# Patient Record
Sex: FEMALE | Race: BLACK OR AFRICAN AMERICAN | NOT HISPANIC OR LATINO | URBAN - METROPOLITAN AREA
[De-identification: names, ages, dates, MRNs, and addresses within clinical notes are randomized per-mention and may not be internally consistent; named-entity substitution may affect disease eponyms.]

---

## 2017-05-30 ENCOUNTER — OUTPATIENT (OUTPATIENT)
Dept: OUTPATIENT SERVICES | Facility: HOSPITAL | Age: 57
LOS: 1 days | End: 2017-05-30
Payer: COMMERCIAL

## 2017-05-30 PROCEDURE — 19081 BX BREAST 1ST LESION STRTCTC: CPT

## 2017-05-30 PROCEDURE — 88305 TISSUE EXAM BY PATHOLOGIST: CPT

## 2017-05-30 PROCEDURE — 19081 BX BREAST 1ST LESION STRTCTC: CPT | Mod: RT

## 2017-05-30 PROCEDURE — 88360 TUMOR IMMUNOHISTOCHEM/MANUAL: CPT

## 2017-05-30 PROCEDURE — A4648: CPT

## 2017-05-31 LAB — SURGICAL PATHOLOGY STUDY: SIGNIFICANT CHANGE UP

## 2017-06-28 PROBLEM — Z00.00 ENCOUNTER FOR PREVENTIVE HEALTH EXAMINATION: Status: ACTIVE | Noted: 2017-06-28

## 2017-07-14 ENCOUNTER — APPOINTMENT (OUTPATIENT)
Dept: BREAST CENTER | Facility: CLINIC | Age: 57
End: 2017-07-14

## 2017-07-14 VITALS
HEART RATE: 87 BPM | SYSTOLIC BLOOD PRESSURE: 155 MMHG | BODY MASS INDEX: 29.71 KG/M2 | TEMPERATURE: 97.8 F | WEIGHT: 174 LBS | DIASTOLIC BLOOD PRESSURE: 92 MMHG | RESPIRATION RATE: 16 BRPM | HEIGHT: 64 IN

## 2017-07-14 DIAGNOSIS — Z80.3 FAMILY HISTORY OF MALIGNANT NEOPLASM OF BREAST: ICD-10-CM

## 2017-07-14 DIAGNOSIS — Z86.39 PERSONAL HISTORY OF OTHER ENDOCRINE, NUTRITIONAL AND METABOLIC DISEASE: ICD-10-CM

## 2017-07-14 DIAGNOSIS — Z86.79 PERSONAL HISTORY OF OTHER DISEASES OF THE CIRCULATORY SYSTEM: ICD-10-CM

## 2017-07-14 DIAGNOSIS — Z87.891 PERSONAL HISTORY OF NICOTINE DEPENDENCE: ICD-10-CM

## 2017-07-14 DIAGNOSIS — Z21 ASYMPTOMATIC HUMAN IMMUNODEFICIENCY VIRUS [HIV] INFECTION STATUS: ICD-10-CM

## 2017-08-08 ENCOUNTER — APPOINTMENT (OUTPATIENT)
Dept: BREAST CENTER | Facility: CLINIC | Age: 57
End: 2017-08-08
Payer: MEDICAID

## 2017-08-08 VITALS
SYSTOLIC BLOOD PRESSURE: 176 MMHG | DIASTOLIC BLOOD PRESSURE: 80 MMHG | WEIGHT: 174 LBS | BODY MASS INDEX: 29.71 KG/M2 | HEART RATE: 82 BPM | TEMPERATURE: 98.8 F | HEIGHT: 64 IN

## 2017-08-08 PROCEDURE — 99205 OFFICE O/P NEW HI 60 MIN: CPT

## 2017-08-15 ENCOUNTER — OTHER (OUTPATIENT)
Age: 57
End: 2017-08-15

## 2017-08-17 ENCOUNTER — APPOINTMENT (OUTPATIENT)
Dept: PLASTIC SURGERY | Facility: CLINIC | Age: 57
End: 2017-08-17
Payer: MEDICAID

## 2017-08-17 PROCEDURE — 99205 OFFICE O/P NEW HI 60 MIN: CPT

## 2017-08-22 ENCOUNTER — FORM ENCOUNTER (OUTPATIENT)
Age: 57
End: 2017-08-22

## 2017-08-23 ENCOUNTER — OUTPATIENT (OUTPATIENT)
Dept: OUTPATIENT SERVICES | Facility: HOSPITAL | Age: 57
LOS: 1 days | End: 2017-08-23
Payer: COMMERCIAL

## 2017-08-23 PROCEDURE — A9585: CPT

## 2017-08-23 PROCEDURE — 77049 MRI BREAST C-+ W/CAD BI: CPT

## 2017-08-23 PROCEDURE — 77059 MRI BREAST BILATERAL: CPT | Mod: 26

## 2017-08-23 PROCEDURE — 0159T: CPT | Mod: 26

## 2017-08-23 PROCEDURE — C8937: CPT

## 2017-08-29 ENCOUNTER — APPOINTMENT (OUTPATIENT)
Dept: BREAST CENTER | Facility: CLINIC | Age: 57
End: 2017-08-29
Payer: MEDICAID

## 2017-08-29 DIAGNOSIS — N63 UNSPECIFIED LUMP IN BREAST: ICD-10-CM

## 2017-08-29 PROCEDURE — 99213 OFFICE O/P EST LOW 20 MIN: CPT

## 2017-08-30 ENCOUNTER — FORM ENCOUNTER (OUTPATIENT)
Age: 57
End: 2017-08-30

## 2017-08-31 ENCOUNTER — OUTPATIENT (OUTPATIENT)
Dept: OUTPATIENT SERVICES | Facility: HOSPITAL | Age: 57
LOS: 1 days | End: 2017-08-31
Payer: COMMERCIAL

## 2017-08-31 PROCEDURE — 76642 ULTRASOUND BREAST LIMITED: CPT

## 2017-08-31 PROCEDURE — 76642 ULTRASOUND BREAST LIMITED: CPT | Mod: 26,RT

## 2017-09-11 ENCOUNTER — OTHER (OUTPATIENT)
Age: 57
End: 2017-09-11

## 2017-09-12 ENCOUNTER — OTHER (OUTPATIENT)
Age: 57
End: 2017-09-12

## 2017-09-14 ENCOUNTER — OTHER (OUTPATIENT)
Age: 57
End: 2017-09-14

## 2017-09-15 ENCOUNTER — OTHER (OUTPATIENT)
Age: 57
End: 2017-09-15

## 2017-09-17 ENCOUNTER — FORM ENCOUNTER (OUTPATIENT)
Age: 57
End: 2017-09-17

## 2017-09-18 ENCOUNTER — OUTPATIENT (OUTPATIENT)
Dept: OUTPATIENT SERVICES | Facility: HOSPITAL | Age: 57
LOS: 1 days | End: 2017-09-18
Payer: COMMERCIAL

## 2017-09-18 ENCOUNTER — RESULT REVIEW (OUTPATIENT)
Age: 57
End: 2017-09-18

## 2017-09-18 PROCEDURE — 88342 IMHCHEM/IMCYTCHM 1ST ANTB: CPT

## 2017-09-18 PROCEDURE — 77065 DX MAMMO INCL CAD UNI: CPT

## 2017-09-18 PROCEDURE — 19083 BX BREAST 1ST LESION US IMAG: CPT | Mod: RT

## 2017-09-18 PROCEDURE — 19083 BX BREAST 1ST LESION US IMAG: CPT

## 2017-09-18 PROCEDURE — A4648: CPT

## 2017-09-18 PROCEDURE — 88305 TISSUE EXAM BY PATHOLOGIST: CPT

## 2017-09-18 PROCEDURE — 88360 TUMOR IMMUNOHISTOCHEM/MANUAL: CPT

## 2017-09-18 PROCEDURE — G0206: CPT | Mod: 26,RT

## 2017-09-20 LAB — SURGICAL PATHOLOGY STUDY: SIGNIFICANT CHANGE UP

## 2017-09-21 ENCOUNTER — OTHER (OUTPATIENT)
Age: 57
End: 2017-09-21

## 2017-09-27 RX ORDER — DEXAMETHASONE 0.5 MG/5ML
20 ELIXIR ORAL ONCE
Qty: 0 | Refills: 0 | Status: DISCONTINUED | OUTPATIENT
Start: 2017-09-28 | End: 2017-10-13

## 2017-09-27 RX ORDER — ONDANSETRON 8 MG/1
16 TABLET, FILM COATED ORAL ONCE
Qty: 0 | Refills: 0 | Status: DISCONTINUED | OUTPATIENT
Start: 2017-09-28 | End: 2017-10-13

## 2017-09-27 RX ORDER — CYCLOPHOSPHAMIDE 100 MG
1130 VIAL (EA) INTRAVENOUS ONCE
Qty: 0 | Refills: 0 | Status: DISCONTINUED | OUTPATIENT
Start: 2017-09-28 | End: 2017-10-13

## 2017-09-28 ENCOUNTER — OUTPATIENT (OUTPATIENT)
Dept: OUTPATIENT SERVICES | Facility: HOSPITAL | Age: 57
LOS: 1 days | End: 2017-09-28
Payer: COMMERCIAL

## 2017-09-28 DIAGNOSIS — C50.919 MALIGNANT NEOPLASM OF UNSPECIFIED SITE OF UNSPECIFIED FEMALE BREAST: ICD-10-CM

## 2017-09-28 DIAGNOSIS — Z51.11 ENCOUNTER FOR ANTINEOPLASTIC CHEMOTHERAPY: ICD-10-CM

## 2017-09-28 DIAGNOSIS — R11.2 NAUSEA WITH VOMITING, UNSPECIFIED: ICD-10-CM

## 2017-09-28 PROCEDURE — 96413 CHEMO IV INFUSION 1 HR: CPT

## 2017-09-28 PROCEDURE — 96372 THER/PROPH/DIAG INJ SC/IM: CPT

## 2017-09-28 PROCEDURE — 96417 CHEMO IV INFUS EACH ADDL SEQ: CPT

## 2017-09-28 PROCEDURE — 96375 TX/PRO/DX INJ NEW DRUG ADDON: CPT

## 2017-09-28 RX ORDER — DOXORUBICIN HYDROCHLORIDE 2 MG/ML
113 INJECTION, SOLUTION INTRAVENOUS ONCE
Qty: 0 | Refills: 0 | Status: DISCONTINUED | OUTPATIENT
Start: 2017-09-28 | End: 2017-10-13

## 2017-09-28 RX ORDER — PEGFILGRASTIM-CBQV 6 MG/.6ML
6 INJECTION, SOLUTION SUBCUTANEOUS ONCE
Qty: 0 | Refills: 0 | Status: DISCONTINUED | OUTPATIENT
Start: 2017-09-28 | End: 2017-09-29

## 2017-09-29 ENCOUNTER — OUTPATIENT (OUTPATIENT)
Dept: OUTPATIENT SERVICES | Facility: HOSPITAL | Age: 57
LOS: 1 days | End: 2017-09-29
Payer: COMMERCIAL

## 2017-09-29 DIAGNOSIS — Z51.11 ENCOUNTER FOR ANTINEOPLASTIC CHEMOTHERAPY: ICD-10-CM

## 2017-09-29 DIAGNOSIS — C50.919 MALIGNANT NEOPLASM OF UNSPECIFIED SITE OF UNSPECIFIED FEMALE BREAST: ICD-10-CM

## 2017-09-29 PROCEDURE — 96372 THER/PROPH/DIAG INJ SC/IM: CPT

## 2017-09-29 RX ORDER — PEGFILGRASTIM-CBQV 6 MG/.6ML
6 INJECTION, SOLUTION SUBCUTANEOUS ONCE
Qty: 0 | Refills: 0 | Status: DISCONTINUED | OUTPATIENT
Start: 2017-09-29 | End: 2017-10-14

## 2017-10-10 ENCOUNTER — OTHER (OUTPATIENT)
Age: 57
End: 2017-10-10

## 2017-10-16 RX ORDER — CYCLOPHOSPHAMIDE 100 MG
1140 VIAL (EA) INTRAVENOUS ONCE
Qty: 0 | Refills: 0 | Status: DISCONTINUED | OUTPATIENT
Start: 2017-10-17 | End: 2017-11-01

## 2017-10-16 RX ORDER — PEGFILGRASTIM-CBQV 6 MG/.6ML
6 INJECTION, SOLUTION SUBCUTANEOUS ONCE
Qty: 0 | Refills: 0 | Status: DISCONTINUED | OUTPATIENT
Start: 2017-10-17 | End: 2017-11-01

## 2017-10-16 RX ORDER — ONDANSETRON 8 MG/1
16 TABLET, FILM COATED ORAL ONCE
Qty: 0 | Refills: 0 | Status: DISCONTINUED | OUTPATIENT
Start: 2017-10-17 | End: 2017-11-01

## 2017-10-16 RX ORDER — DEXAMETHASONE 0.5 MG/5ML
20 ELIXIR ORAL ONCE
Qty: 0 | Refills: 0 | Status: DISCONTINUED | OUTPATIENT
Start: 2017-10-17 | End: 2017-11-01

## 2017-10-17 ENCOUNTER — APPOINTMENT (OUTPATIENT)
Dept: INFUSION THERAPY | Facility: HOSPITAL | Age: 57
End: 2017-10-17

## 2017-10-17 ENCOUNTER — OUTPATIENT (OUTPATIENT)
Dept: OUTPATIENT SERVICES | Facility: HOSPITAL | Age: 57
LOS: 1 days | End: 2017-10-17
Payer: COMMERCIAL

## 2017-10-17 DIAGNOSIS — C50.919 MALIGNANT NEOPLASM OF UNSPECIFIED SITE OF UNSPECIFIED FEMALE BREAST: ICD-10-CM

## 2017-10-17 PROCEDURE — 96413 CHEMO IV INFUSION 1 HR: CPT

## 2017-10-17 PROCEDURE — 96372 THER/PROPH/DIAG INJ SC/IM: CPT

## 2017-10-17 PROCEDURE — 96411 CHEMO IV PUSH ADDL DRUG: CPT

## 2017-10-17 PROCEDURE — 96375 TX/PRO/DX INJ NEW DRUG ADDON: CPT

## 2017-10-17 RX ORDER — DOXORUBICIN HYDROCHLORIDE 2 MG/ML
114 INJECTION, SOLUTION INTRAVENOUS ONCE
Qty: 0 | Refills: 0 | Status: DISCONTINUED | OUTPATIENT
Start: 2017-10-17 | End: 2017-10-17

## 2017-10-17 RX ORDER — DOXORUBICIN HYDROCHLORIDE 2 MG/ML
114 INJECTION, SOLUTION INTRAVENOUS ONCE
Qty: 0 | Refills: 0 | Status: DISCONTINUED | OUTPATIENT
Start: 2017-10-17 | End: 2017-11-01

## 2017-11-01 DIAGNOSIS — R11.2 NAUSEA WITH VOMITING, UNSPECIFIED: ICD-10-CM

## 2017-11-01 DIAGNOSIS — Z51.11 ENCOUNTER FOR ANTINEOPLASTIC CHEMOTHERAPY: ICD-10-CM

## 2017-11-02 RX ORDER — DEXAMETHASONE 0.5 MG/5ML
20 ELIXIR ORAL ONCE
Qty: 0 | Refills: 0 | Status: DISCONTINUED | OUTPATIENT
Start: 2017-11-03 | End: 2017-11-18

## 2017-11-02 RX ORDER — DOXORUBICIN HYDROCHLORIDE 2 MG/ML
113 INJECTION, SOLUTION INTRAVENOUS ONCE
Qty: 0 | Refills: 0 | Status: DISCONTINUED | OUTPATIENT
Start: 2017-11-03 | End: 2017-11-18

## 2017-11-02 RX ORDER — ONDANSETRON 8 MG/1
16 TABLET, FILM COATED ORAL ONCE
Qty: 0 | Refills: 0 | Status: DISCONTINUED | OUTPATIENT
Start: 2017-11-03 | End: 2017-11-18

## 2017-11-02 RX ORDER — CYCLOPHOSPHAMIDE 100 MG
1130 VIAL (EA) INTRAVENOUS ONCE
Qty: 0 | Refills: 0 | Status: DISCONTINUED | OUTPATIENT
Start: 2017-11-03 | End: 2017-11-18

## 2017-11-03 ENCOUNTER — OUTPATIENT (OUTPATIENT)
Dept: OUTPATIENT SERVICES | Facility: HOSPITAL | Age: 57
LOS: 1 days | End: 2017-11-03
Payer: COMMERCIAL

## 2017-11-03 ENCOUNTER — APPOINTMENT (OUTPATIENT)
Dept: INFUSION THERAPY | Facility: HOSPITAL | Age: 57
End: 2017-11-03

## 2017-11-03 DIAGNOSIS — C50.919 MALIGNANT NEOPLASM OF UNSPECIFIED SITE OF UNSPECIFIED FEMALE BREAST: ICD-10-CM

## 2017-11-03 PROCEDURE — 96413 CHEMO IV INFUSION 1 HR: CPT

## 2017-11-03 PROCEDURE — 96375 TX/PRO/DX INJ NEW DRUG ADDON: CPT

## 2017-11-03 PROCEDURE — 96417 CHEMO IV INFUS EACH ADDL SEQ: CPT

## 2017-11-03 RX ORDER — PEGFILGRASTIM-CBQV 6 MG/.6ML
6 INJECTION, SOLUTION SUBCUTANEOUS ONCE
Qty: 0 | Refills: 0 | Status: DISCONTINUED | OUTPATIENT
Start: 2017-11-03 | End: 2017-11-03

## 2017-11-04 ENCOUNTER — OUTPATIENT (OUTPATIENT)
Dept: OUTPATIENT SERVICES | Facility: HOSPITAL | Age: 57
LOS: 1 days | End: 2017-11-04
Payer: COMMERCIAL

## 2017-11-04 DIAGNOSIS — C50.919 MALIGNANT NEOPLASM OF UNSPECIFIED SITE OF UNSPECIFIED FEMALE BREAST: ICD-10-CM

## 2017-11-04 PROCEDURE — 96372 THER/PROPH/DIAG INJ SC/IM: CPT

## 2017-11-06 DIAGNOSIS — Z51.11 ENCOUNTER FOR ANTINEOPLASTIC CHEMOTHERAPY: ICD-10-CM

## 2017-11-16 RX ORDER — CYCLOPHOSPHAMIDE 100 MG
1130 VIAL (EA) INTRAVENOUS ONCE
Qty: 0 | Refills: 0 | Status: DISCONTINUED | OUTPATIENT
Start: 2017-11-21 | End: 2017-12-06

## 2017-11-16 RX ORDER — DOXORUBICIN HYDROCHLORIDE 2 MG/ML
113 INJECTION, SOLUTION INTRAVENOUS ONCE
Qty: 0 | Refills: 0 | Status: DISCONTINUED | OUTPATIENT
Start: 2017-11-21 | End: 2017-12-06

## 2017-11-16 RX ORDER — DIPHENHYDRAMINE HCL 50 MG
25 CAPSULE ORAL ONCE
Qty: 0 | Refills: 0 | Status: DISCONTINUED | OUTPATIENT
Start: 2017-11-21 | End: 2017-12-06

## 2017-11-16 RX ORDER — ONDANSETRON 8 MG/1
16 TABLET, FILM COATED ORAL ONCE
Qty: 0 | Refills: 0 | Status: DISCONTINUED | OUTPATIENT
Start: 2017-11-21 | End: 2017-12-06

## 2017-11-16 RX ORDER — DEXAMETHASONE 0.5 MG/5ML
20 ELIXIR ORAL ONCE
Qty: 0 | Refills: 0 | Status: DISCONTINUED | OUTPATIENT
Start: 2017-11-21 | End: 2017-12-06

## 2017-11-17 ENCOUNTER — APPOINTMENT (OUTPATIENT)
Dept: INFUSION THERAPY | Facility: HOSPITAL | Age: 57
End: 2017-11-17

## 2017-11-21 ENCOUNTER — OUTPATIENT (OUTPATIENT)
Dept: OUTPATIENT SERVICES | Facility: HOSPITAL | Age: 57
LOS: 1 days | End: 2017-11-21
Payer: COMMERCIAL

## 2017-11-21 DIAGNOSIS — C50.919 MALIGNANT NEOPLASM OF UNSPECIFIED SITE OF UNSPECIFIED FEMALE BREAST: ICD-10-CM

## 2017-11-21 PROCEDURE — 96417 CHEMO IV INFUS EACH ADDL SEQ: CPT

## 2017-11-21 PROCEDURE — 96375 TX/PRO/DX INJ NEW DRUG ADDON: CPT

## 2017-11-21 PROCEDURE — 96413 CHEMO IV INFUSION 1 HR: CPT

## 2017-11-22 ENCOUNTER — OUTPATIENT (OUTPATIENT)
Dept: OUTPATIENT SERVICES | Facility: HOSPITAL | Age: 57
LOS: 1 days | End: 2017-11-22
Payer: COMMERCIAL

## 2017-11-22 ENCOUNTER — EMERGENCY (EMERGENCY)
Facility: HOSPITAL | Age: 57
LOS: 1 days | Discharge: HOME | End: 2017-11-22
Admitting: EMERGENCY MEDICINE
Payer: COMMERCIAL

## 2017-11-22 VITALS
OXYGEN SATURATION: 98 % | RESPIRATION RATE: 20 BRPM | HEIGHT: 64 IN | SYSTOLIC BLOOD PRESSURE: 155 MMHG | HEART RATE: 91 BPM | DIASTOLIC BLOOD PRESSURE: 80 MMHG | WEIGHT: 175.05 LBS | TEMPERATURE: 99 F

## 2017-11-22 DIAGNOSIS — E11.9 TYPE 2 DIABETES MELLITUS WITHOUT COMPLICATIONS: ICD-10-CM

## 2017-11-22 DIAGNOSIS — T16.1XXA FOREIGN BODY IN RIGHT EAR, INITIAL ENCOUNTER: ICD-10-CM
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
## 2017-11-22 DIAGNOSIS — I10 ESSENTIAL (PRIMARY) HYPERTENSION: ICD-10-CM
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
## 2017-11-22 DIAGNOSIS — C50.919 MALIGNANT NEOPLASM OF UNSPECIFIED SITE OF UNSPECIFIED FEMALE BREAST: ICD-10-CM

## 2017-11-22 PROCEDURE — 99282 EMERGENCY DEPT VISIT SF MDM: CPT | Mod: 25

## 2017-11-22 PROCEDURE — 69200 CLEAR OUTER EAR CANAL: CPT | Mod: RT

## 2017-11-22 PROCEDURE — 99283 EMERGENCY DEPT VISIT LOW MDM: CPT | Mod: 25

## 2017-11-22 PROCEDURE — 96372 THER/PROPH/DIAG INJ SC/IM: CPT

## 2017-11-22 RX ORDER — PEGFILGRASTIM-CBQV 6 MG/.6ML
6 INJECTION, SOLUTION SUBCUTANEOUS ONCE
Qty: 0 | Refills: 0 | Status: DISCONTINUED | OUTPATIENT
Start: 2017-11-22 | End: 2017-12-07

## 2017-11-22 NOTE — ED PROVIDER NOTE - CARE PLAN
Principal Discharge DX:	Foreign body in ear  Instructions for follow-up, activity and diet:	Please follow up with your PMD

## 2017-11-22 NOTE — ED PROVIDER NOTE - PHYSICAL EXAMINATION
Right ear: small cotton located in right ear. Removed. Ear shows no blood, signs of trauma with nml TM

## 2017-11-22 NOTE — ED PROVIDER NOTE - OBJECTIVE STATEMENT
58 y/o female with no pertinent pmhx is present with a FB in the right ear x2 hours. Pt reports using a q-tip this morning and the cotton remained in her ear. Pt denies the following: pain, discharge, ringing.

## 2017-11-22 NOTE — ED PROVIDER NOTE - PMH
Diabetes    HIV (human immunodeficiency virus infection)    HTN (hypertension)    HTN (hypertension)

## 2017-11-22 NOTE — ED ADULT NURSE NOTE - OBJECTIVE STATEMENT
Patient comes in procedure room states, "this morning I got a q-tip stuck in my right ear." No prior tx.

## 2017-11-22 NOTE — ED ADULT NURSE NOTE - CHPI ED SYMPTOMS NEG
no chills/no blurred vision/no vomiting/no nausea/no numbness/no weakness/no syncope/no loss of consciousness/no fever/no change in level of consciousness/denies any blood or drainage coming for the right ear, ringing in the ears.

## 2017-11-22 NOTE — ED PROVIDER NOTE - MEDICAL DECISION MAKING DETAILS
58 y/o female with cotton tip stuck in the right ear. Removed with forceps. TM clear with no perforation and no trauma to the ear canal with Hearing intact. Advised pt to refrain from using q-tips for ear cleaning and to use tissue paper instead.

## 2017-11-28 DIAGNOSIS — R11.2 NAUSEA WITH VOMITING, UNSPECIFIED: ICD-10-CM

## 2017-11-28 DIAGNOSIS — R13.11 DYSPHAGIA, ORAL PHASE: ICD-10-CM

## 2017-11-28 DIAGNOSIS — Z51.11 ENCOUNTER FOR ANTINEOPLASTIC CHEMOTHERAPY: ICD-10-CM

## 2017-12-06 ENCOUNTER — APPOINTMENT (OUTPATIENT)
Dept: BREAST CENTER | Facility: CLINIC | Age: 57
End: 2017-12-06

## 2017-12-19 ENCOUNTER — APPOINTMENT (OUTPATIENT)
Dept: BREAST CENTER | Facility: CLINIC | Age: 57
End: 2017-12-19
Payer: MEDICAID

## 2017-12-19 ENCOUNTER — EMERGENCY (EMERGENCY)
Facility: HOSPITAL | Age: 57
LOS: 1 days | Discharge: HOME | End: 2017-12-19
Attending: EMERGENCY MEDICINE | Admitting: EMERGENCY MEDICINE
Payer: COMMERCIAL

## 2017-12-19 VITALS
BODY MASS INDEX: 29.02 KG/M2 | HEART RATE: 66 BPM | HEIGHT: 64 IN | TEMPERATURE: 97.6 F | RESPIRATION RATE: 16 BRPM | SYSTOLIC BLOOD PRESSURE: 144 MMHG | DIASTOLIC BLOOD PRESSURE: 88 MMHG | WEIGHT: 170 LBS

## 2017-12-19 VITALS
OXYGEN SATURATION: 96 % | HEIGHT: 64 IN | TEMPERATURE: 98 F | SYSTOLIC BLOOD PRESSURE: 143 MMHG | WEIGHT: 160.06 LBS | DIASTOLIC BLOOD PRESSURE: 90 MMHG | RESPIRATION RATE: 16 BRPM | HEART RATE: 90 BPM

## 2017-12-19 DIAGNOSIS — R89.7 ABNORMAL HISTOLOGICAL FINDINGS IN SPECIMENS FROM OTHER ORGANS, SYSTEMS AND TISSUES: ICD-10-CM

## 2017-12-19 PROCEDURE — 99283 EMERGENCY DEPT VISIT LOW MDM: CPT | Mod: 25

## 2017-12-19 PROCEDURE — 99214 OFFICE O/P EST MOD 30 MIN: CPT

## 2017-12-19 PROCEDURE — 93005 ELECTROCARDIOGRAM TRACING: CPT

## 2017-12-19 NOTE — ED ADULT TRIAGE NOTE - ARRIVAL INFO ADDITIONAL COMMENTS
Associated symptoms productive cough with white sputum and exertional SOB. Denies any CP, dizziness, fever, N/V/D.

## 2017-12-19 NOTE — ED ADULT TRIAGE NOTE - CHIEF COMPLAINT QUOTE
"I am having trouble breathing for about 3 weeks. I just finished chemo in the end of November for breast cancer. When I lay on my side I hear like wheezing."

## 2017-12-22 DIAGNOSIS — R06.02 SHORTNESS OF BREATH: ICD-10-CM

## 2017-12-28 ENCOUNTER — APPOINTMENT (OUTPATIENT)
Dept: PLASTIC SURGERY | Facility: CLINIC | Age: 57
End: 2017-12-28

## 2018-01-08 ENCOUNTER — APPOINTMENT (OUTPATIENT)
Dept: PLASTIC SURGERY | Facility: CLINIC | Age: 58
End: 2018-01-08
Payer: MEDICAID

## 2018-01-08 VITALS — WEIGHT: 165 LBS | BODY MASS INDEX: 28.17 KG/M2 | HEIGHT: 64 IN

## 2018-01-08 PROCEDURE — 99213 OFFICE O/P EST LOW 20 MIN: CPT

## 2018-01-09 ENCOUNTER — FORM ENCOUNTER (OUTPATIENT)
Age: 58
End: 2018-01-09

## 2018-01-10 ENCOUNTER — OUTPATIENT (OUTPATIENT)
Dept: OUTPATIENT SERVICES | Facility: HOSPITAL | Age: 58
LOS: 1 days | End: 2018-01-10
Payer: COMMERCIAL

## 2018-01-10 PROCEDURE — 77065 DX MAMMO INCL CAD UNI: CPT

## 2018-01-10 PROCEDURE — 76641 ULTRASOUND BREAST COMPLETE: CPT | Mod: 26,LT

## 2018-01-10 PROCEDURE — 76641 ULTRASOUND BREAST COMPLETE: CPT

## 2018-01-10 PROCEDURE — 77065 DX MAMMO INCL CAD UNI: CPT | Mod: 26,LT

## 2018-01-17 ENCOUNTER — FORM ENCOUNTER (OUTPATIENT)
Age: 58
End: 2018-01-17

## 2018-01-18 ENCOUNTER — RESULT REVIEW (OUTPATIENT)
Age: 58
End: 2018-01-18

## 2018-01-18 ENCOUNTER — OUTPATIENT (OUTPATIENT)
Dept: OUTPATIENT SERVICES | Facility: HOSPITAL | Age: 58
LOS: 1 days | End: 2018-01-18
Payer: COMMERCIAL

## 2018-01-18 PROCEDURE — 19083 BX BREAST 1ST LESION US IMAG: CPT

## 2018-01-18 PROCEDURE — 88305 TISSUE EXAM BY PATHOLOGIST: CPT

## 2018-01-18 PROCEDURE — A4648: CPT

## 2018-01-18 PROCEDURE — 19083 BX BREAST 1ST LESION US IMAG: CPT | Mod: LT

## 2018-01-19 LAB — SURGICAL PATHOLOGY STUDY: SIGNIFICANT CHANGE UP

## 2018-01-24 VITALS
SYSTOLIC BLOOD PRESSURE: 173 MMHG | DIASTOLIC BLOOD PRESSURE: 76 MMHG | TEMPERATURE: 98 F | HEIGHT: 64 IN | OXYGEN SATURATION: 100 % | WEIGHT: 156.97 LBS | HEART RATE: 86 BPM | RESPIRATION RATE: 18 BRPM

## 2018-01-24 RX ORDER — METFORMIN HYDROCHLORIDE 850 MG/1
0 TABLET ORAL
Qty: 0 | Refills: 0 | COMMUNITY

## 2018-01-24 RX ORDER — VALACYCLOVIR 500 MG/1
1 TABLET, FILM COATED ORAL
Qty: 0 | Refills: 0 | COMMUNITY

## 2018-01-24 RX ORDER — ACYCLOVIR SODIUM 500 MG
0 VIAL (EA) INTRAVENOUS
Qty: 0 | Refills: 0 | COMMUNITY

## 2018-01-24 RX ORDER — LISINOPRIL 2.5 MG/1
0 TABLET ORAL
Qty: 0 | Refills: 0 | COMMUNITY

## 2018-01-24 NOTE — PATIENT PROFILE ADULT. - PMH
Breast cancer  right  Cardiomyopathy    Diabetes  type 2  HIV (human immunodeficiency virus infection)    HLD (hyperlipidemia)    HTN (hypertension)    Mitral regurgitation Breast cancer  right, s/p chemo  Cardiomyopathy    Diabetes  type 2  HIV (human immunodeficiency virus infection)    HLD (hyperlipidemia)    HTN (hypertension)    Mitral regurgitation

## 2018-01-25 ENCOUNTER — INPATIENT (INPATIENT)
Facility: HOSPITAL | Age: 58
LOS: 1 days | Discharge: HOME | DRG: 580 | End: 2018-01-27
Attending: SURGERY | Admitting: SURGERY
Payer: COMMERCIAL

## 2018-01-25 ENCOUNTER — APPOINTMENT (OUTPATIENT)
Dept: PLASTIC SURGERY | Facility: HOSPITAL | Age: 58
End: 2018-01-25

## 2018-01-25 ENCOUNTER — APPOINTMENT (OUTPATIENT)
Dept: BREAST CENTER | Facility: HOSPITAL | Age: 58
End: 2018-01-25

## 2018-01-25 ENCOUNTER — RESULT REVIEW (OUTPATIENT)
Age: 58
End: 2018-01-25

## 2018-01-25 DIAGNOSIS — Z98.890 OTHER SPECIFIED POSTPROCEDURAL STATES: Chronic | ICD-10-CM

## 2018-01-25 LAB
GLUCOSE BLDC GLUCOMTR-MCNC: 128 MG/DL — HIGH (ref 70–99)
GLUCOSE BLDC GLUCOMTR-MCNC: 87 MG/DL — SIGNIFICANT CHANGE UP (ref 70–99)

## 2018-01-25 PROCEDURE — 19307 MAST MOD RAD: CPT | Mod: RT,GC

## 2018-01-25 PROCEDURE — 78195 LYMPH SYSTEM IMAGING: CPT | Mod: 26

## 2018-01-25 PROCEDURE — 19357 TISS XPNDR PLMT BRST RCNSTJ: CPT | Mod: RT

## 2018-01-25 RX ORDER — SODIUM CHLORIDE 9 MG/ML
1000 INJECTION, SOLUTION INTRAVENOUS
Qty: 0 | Refills: 0 | Status: DISCONTINUED | OUTPATIENT
Start: 2018-01-25 | End: 2018-01-27

## 2018-01-25 RX ORDER — LABETALOL HCL 100 MG
10 TABLET ORAL ONCE
Qty: 0 | Refills: 0 | Status: COMPLETED | OUTPATIENT
Start: 2018-01-25 | End: 2018-01-25

## 2018-01-25 RX ORDER — INSULIN LISPRO 100/ML
VIAL (ML) SUBCUTANEOUS
Qty: 0 | Refills: 0 | Status: DISCONTINUED | OUTPATIENT
Start: 2018-01-25 | End: 2018-01-27

## 2018-01-25 RX ORDER — GLUCAGON INJECTION, SOLUTION 0.5 MG/.1ML
1 INJECTION, SOLUTION SUBCUTANEOUS ONCE
Qty: 0 | Refills: 0 | Status: DISCONTINUED | OUTPATIENT
Start: 2018-01-25 | End: 2018-01-27

## 2018-01-25 RX ORDER — DEXTROSE 50 % IN WATER 50 %
12.5 SYRINGE (ML) INTRAVENOUS ONCE
Qty: 0 | Refills: 0 | Status: DISCONTINUED | OUTPATIENT
Start: 2018-01-25 | End: 2018-01-27

## 2018-01-25 RX ORDER — ACETAMINOPHEN 500 MG
650 TABLET ORAL EVERY 6 HOURS
Qty: 0 | Refills: 0 | Status: DISCONTINUED | OUTPATIENT
Start: 2018-01-25 | End: 2018-01-27

## 2018-01-25 RX ORDER — LISINOPRIL 2.5 MG/1
20 TABLET ORAL DAILY
Qty: 0 | Refills: 0 | Status: DISCONTINUED | OUTPATIENT
Start: 2018-01-26 | End: 2018-01-27

## 2018-01-25 RX ORDER — HYDROMORPHONE HYDROCHLORIDE 2 MG/ML
0.5 INJECTION INTRAMUSCULAR; INTRAVENOUS; SUBCUTANEOUS EVERY 4 HOURS
Qty: 0 | Refills: 0 | Status: DISCONTINUED | OUTPATIENT
Start: 2018-01-25 | End: 2018-01-27

## 2018-01-25 RX ORDER — ONDANSETRON 8 MG/1
4 TABLET, FILM COATED ORAL EVERY 6 HOURS
Qty: 0 | Refills: 0 | Status: DISCONTINUED | OUTPATIENT
Start: 2018-01-25 | End: 2018-01-27

## 2018-01-25 RX ORDER — HYDROMORPHONE HYDROCHLORIDE 2 MG/ML
1 INJECTION INTRAMUSCULAR; INTRAVENOUS; SUBCUTANEOUS EVERY 4 HOURS
Qty: 0 | Refills: 0 | Status: DISCONTINUED | OUTPATIENT
Start: 2018-01-25 | End: 2018-01-27

## 2018-01-25 RX ORDER — CEFAZOLIN SODIUM 1 G
2000 VIAL (EA) INJECTION EVERY 8 HOURS
Qty: 0 | Refills: 0 | Status: DISCONTINUED | OUTPATIENT
Start: 2018-01-25 | End: 2018-01-27

## 2018-01-25 RX ORDER — RISPERIDONE 4 MG/1
2 TABLET ORAL
Qty: 0 | Refills: 0 | Status: DISCONTINUED | OUTPATIENT
Start: 2018-01-26 | End: 2018-01-27

## 2018-01-25 RX ORDER — DEXTROSE 50 % IN WATER 50 %
25 SYRINGE (ML) INTRAVENOUS ONCE
Qty: 0 | Refills: 0 | Status: DISCONTINUED | OUTPATIENT
Start: 2018-01-25 | End: 2018-01-27

## 2018-01-25 RX ORDER — DEXTROSE 50 % IN WATER 50 %
1 SYRINGE (ML) INTRAVENOUS ONCE
Qty: 0 | Refills: 0 | Status: DISCONTINUED | OUTPATIENT
Start: 2018-01-25 | End: 2018-01-27

## 2018-01-25 RX ORDER — HEPARIN SODIUM 5000 [USP'U]/ML
5000 INJECTION INTRAVENOUS; SUBCUTANEOUS EVERY 8 HOURS
Qty: 0 | Refills: 0 | Status: DISCONTINUED | OUTPATIENT
Start: 2018-01-25 | End: 2018-01-27

## 2018-01-25 RX ADMIN — Medication 10 MILLIGRAM(S): at 21:07

## 2018-01-25 RX ADMIN — Medication 10 MILLIGRAM(S): at 20:52

## 2018-01-25 RX ADMIN — Medication 2000 MILLIGRAM(S): at 22:57

## 2018-01-25 NOTE — PROGRESS NOTE ADULT - ASSESSMENT
A/P: 57y Female s/p above procedure  Diet: CLD  IVF  cefazolin  Pain/nausea control  JPx2  SQH/SCDs/OOBA/IS  Dispo pending pain control, PO tolerance, clinical improvement

## 2018-01-25 NOTE — H&P ADULT - ASSESSMENT
58 yo female weitony R breast ca, s/p chemotherapy, presents for elective mastectomy with expander placement    - regional level of care  - advance diet as tolerated  - LR@75  - IV Dilaudid, Tylenol prn  - anti-nausea meds  - DVT ppx  - pulm toilet/IS 56 yo female weitony R breast ca, s/p chemotherapy, presents for elective mastectomy with expander placement    - regional level of care  - advance diet as tolerated  - gentle IVF  - IV Dilaudid, Tylenol prn  - anti-nausea meds  - DVT ppx  - pulm toilet/IS 56 yo female alli R breast ca, s/p chemotherapy, presents for elective mastectomy with expander placement    - regional level of care  - clears, diet in am  - IV Ancef  - IV Dilaudid, Tylenol prn  - anti-nausea meds  - DVT ppx  - pulm toilet/IS

## 2018-01-25 NOTE — PROGRESS NOTE ADULT - SUBJECTIVE AND OBJECTIVE BOX
POST-OPERATIVE NOTE    Procedure: right modified mastrectomy with sentinal lymph node biopsy and reconstruction with tissue expander    Diagnosis/Indication: right breast cancer    Surgeon: Dr. Vargas    S: Pt has no complaints. Denies CP, SOB, DUNN, calf tenderness. Pain controlled with medication. Denies nausea, vomiting.    O:  T(C): 36.8 (01-25-18 @ 21:50), Max: 36.8 (01-25-18 @ 21:50)  T(F): 98.3 (01-25-18 @ 21:50), Max: 98.3 (01-25-18 @ 21:50)  HR: 84 (01-25-18 @ 22:09) (80 - 86)  BP: 162/90 (01-25-18 @ 22:09) (152/96 - 167/100)  RR: 7 (01-25-18 @ 22:09) (7 - 17)  SpO2: 100% (01-25-18 @ 22:09) (99% - 100%)  Wt(kg): --            Gen: NAD, resting comfortably in bed  C/V: NSR  Chest: surgical incisions clean, dry, and intact. JPx2 with serousangious  Pulm: Nonlabored breathing, no respiratory distress  Abd: soft, NT/ND  Extrem: WWP, no calf edema or tenderness, SCDs in place      A/P: 57y Female s/p above procedure  Diet:  IVF:  Pain/nausea control  SQH/SCDs/OOBA/IS  Dispo pending pain control, PO tolerance, clinical improvement

## 2018-01-25 NOTE — BRIEF OPERATIVE NOTE - PROCEDURE
<<-----Click on this checkbox to enter Procedure Mastectomy with axillary lymph node dissection  01/25/2018    Active  RON

## 2018-01-25 NOTE — H&P ADULT - PMH
Breast cancer  right, s/p chemo  Cardiomyopathy    Diabetes  type 2  HIV (human immunodeficiency virus infection)    HLD (hyperlipidemia)    HTN (hypertension)    Mitral regurgitation

## 2018-01-25 NOTE — H&P ADULT - HISTORY OF PRESENT ILLNESS
56 yo female with hx of HIV, cardiomyopathy (EF 40%, after stress test 28%), severe mitral valve regurgitation, HTN, HLD, DM2, s/p 3 x  and hernia repair, with known right breast cancer, s/p chemotherapy *(finished 2017) presents for elective right mastectomy with expander placement. 58 yo female with hx of HIV, cardiomyopathy (EF 40%, after stress test 28%), severe mitral valve regurgitation, HTN, HLD, DM2, s/p 3 x  and hernia repair, with known right breast cancer, s/p chemotherapy *(finished 2017) presents for elective right mastectomy/ sentinel lymph node biopsy vs lymphadenectomy with expander placement.

## 2018-01-25 NOTE — BRIEF OPERATIVE NOTE - OPERATION/FINDINGS
Right modified radical mastectomy, 3 sentinel lymph node biopsy and completion right lymphadenectomy.  Plastic surgery team to complete reconstruction with tissue expander

## 2018-01-25 NOTE — H&P ADULT - NSHPPHYSICALEXAM_GEN_ALL_CORE
Alert and oriented x 3, in no distress  CTAB  RRR  Abdomen soft, ND, NT throughout  Palpable R breast mass  Extremities: WWP

## 2018-01-26 ENCOUNTER — TRANSCRIPTION ENCOUNTER (OUTPATIENT)
Age: 58
End: 2018-01-26

## 2018-01-26 LAB
ANION GAP SERPL CALC-SCNC: 13 MMOL/L — SIGNIFICANT CHANGE UP (ref 5–17)
BUN SERPL-MCNC: 11 MG/DL — SIGNIFICANT CHANGE UP (ref 7–23)
CALCIUM SERPL-MCNC: 9.1 MG/DL — SIGNIFICANT CHANGE UP (ref 8.4–10.5)
CHLORIDE SERPL-SCNC: 103 MMOL/L — SIGNIFICANT CHANGE UP (ref 96–108)
CO2 SERPL-SCNC: 23 MMOL/L — SIGNIFICANT CHANGE UP (ref 22–31)
CREAT SERPL-MCNC: 0.68 MG/DL — SIGNIFICANT CHANGE UP (ref 0.5–1.3)
GLUCOSE BLDC GLUCOMTR-MCNC: 152 MG/DL — HIGH (ref 70–99)
GLUCOSE BLDC GLUCOMTR-MCNC: 158 MG/DL — HIGH (ref 70–99)
GLUCOSE BLDC GLUCOMTR-MCNC: 164 MG/DL — HIGH (ref 70–99)
GLUCOSE BLDC GLUCOMTR-MCNC: 184 MG/DL — HIGH (ref 70–99)
GLUCOSE SERPL-MCNC: 181 MG/DL — HIGH (ref 70–99)
HCT VFR BLD CALC: 38.1 % — SIGNIFICANT CHANGE UP (ref 34.5–45)
HGB BLD-MCNC: 12.6 G/DL — SIGNIFICANT CHANGE UP (ref 11.5–15.5)
MAGNESIUM SERPL-MCNC: 1.7 MG/DL — SIGNIFICANT CHANGE UP (ref 1.6–2.6)
MCHC RBC-ENTMCNC: 33.1 G/DL — SIGNIFICANT CHANGE UP (ref 32–36)
MCHC RBC-ENTMCNC: 34.1 PG — HIGH (ref 27–34)
MCV RBC AUTO: 103.3 FL — HIGH (ref 80–100)
PHOSPHATE SERPL-MCNC: 3.4 MG/DL — SIGNIFICANT CHANGE UP (ref 2.5–4.5)
PLATELET # BLD AUTO: 199 K/UL — SIGNIFICANT CHANGE UP (ref 150–400)
POTASSIUM SERPL-MCNC: 3.4 MMOL/L — LOW (ref 3.5–5.3)
POTASSIUM SERPL-SCNC: 3.4 MMOL/L — LOW (ref 3.5–5.3)
RBC # BLD: 3.69 M/UL — LOW (ref 3.8–5.2)
RBC # FLD: 13.5 % — SIGNIFICANT CHANGE UP (ref 10.3–16.9)
SODIUM SERPL-SCNC: 139 MMOL/L — SIGNIFICANT CHANGE UP (ref 135–145)
WBC # BLD: 6.4 K/UL — SIGNIFICANT CHANGE UP (ref 3.8–10.5)
WBC # FLD AUTO: 6.4 K/UL — SIGNIFICANT CHANGE UP (ref 3.8–10.5)

## 2018-01-26 RX ORDER — POTASSIUM CHLORIDE 20 MEQ
20 PACKET (EA) ORAL
Qty: 0 | Refills: 0 | Status: COMPLETED | OUTPATIENT
Start: 2018-01-26 | End: 2018-01-26

## 2018-01-26 RX ORDER — ACETAMINOPHEN 500 MG
2 TABLET ORAL
Qty: 0 | Refills: 0 | COMMUNITY
Start: 2018-01-26

## 2018-01-26 RX ADMIN — Medication 2: at 07:21

## 2018-01-26 RX ADMIN — Medication 2: at 22:21

## 2018-01-26 RX ADMIN — HEPARIN SODIUM 5000 UNIT(S): 5000 INJECTION INTRAVENOUS; SUBCUTANEOUS at 09:15

## 2018-01-26 RX ADMIN — HEPARIN SODIUM 5000 UNIT(S): 5000 INJECTION INTRAVENOUS; SUBCUTANEOUS at 17:30

## 2018-01-26 RX ADMIN — LISINOPRIL 20 MILLIGRAM(S): 2.5 TABLET ORAL at 06:25

## 2018-01-26 RX ADMIN — Medication 2000 MILLIGRAM(S): at 22:05

## 2018-01-26 RX ADMIN — Medication 2000 MILLIGRAM(S): at 06:25

## 2018-01-26 RX ADMIN — Medication 2: at 17:30

## 2018-01-26 RX ADMIN — HEPARIN SODIUM 5000 UNIT(S): 5000 INJECTION INTRAVENOUS; SUBCUTANEOUS at 00:31

## 2018-01-26 RX ADMIN — Medication 1250 MILLIGRAM(S): at 09:15

## 2018-01-26 RX ADMIN — Medication 2000 MILLIGRAM(S): at 15:00

## 2018-01-26 RX ADMIN — Medication 2: at 11:40

## 2018-01-26 RX ADMIN — Medication 650 MILLIGRAM(S): at 17:38

## 2018-01-26 RX ADMIN — Medication 20 MILLIEQUIVALENT(S): at 11:40

## 2018-01-26 RX ADMIN — RISPERIDONE 2 MILLIGRAM(S): 4 TABLET ORAL at 06:24

## 2018-01-26 RX ADMIN — Medication 20 MILLIEQUIVALENT(S): at 09:15

## 2018-01-26 RX ADMIN — Medication 650 MILLIGRAM(S): at 07:21

## 2018-01-26 RX ADMIN — Medication 650 MILLIGRAM(S): at 23:41

## 2018-01-26 NOTE — DISCHARGE NOTE ADULT - CARE PROVIDER_API CALL
Gissel Jean), Surgery  [address] [place] [address]  [address] [place] [address]  Phone: [phone]  Fax: [phone]    Vaughn Costa), Plastic Surgery  [address] [place], [place] [address]  Phone: [phone]  Fax: [phone]

## 2018-01-26 NOTE — DISCHARGE NOTE ADULT - MEDICATION SUMMARY - MEDICATIONS TO TAKE
I will START or STAY ON the medications listed below when I get home from the hospital:    aspirin 81 mg oral tablet  -- 1 tab(s) by mouth once a day  -- Indication: For Home med     acetaminophen 325 mg oral tablet  -- 2 tab(s) by mouth every 6 hours, As needed, light pain  -- Indication: For Home med     lisinopril 20 mg oral tablet  -- 1 tab(s) by mouth once a day  -- Indication: For Home med     metFORMIN 1000 mg oral tablet  -- 1 tab(s) by mouth 2 times a day  -- Indication: For Home med     RisperDAL 2 mg oral tablet  -- 1 tab(s) by mouth 2 times a day  -- Indication: For Home med     lamivudine-zidovudine 150 mg-300 mg oral tablet  -- 1 tab(s) by mouth 2 times a day  -- Indication: For Home med I will START or STAY ON the medications listed below when I get home from the hospital:    aspirin 81 mg oral tablet  -- 1 tab(s) by mouth once a day  -- Indication: For Home med     acetaminophen 325 mg oral tablet  -- 2 tab(s) by mouth every 6 hours, As needed, light pain  -- Indication: For Home med     Percocet 5/325 oral tablet  -- 1 tab(s) by mouth every 4 hours MDD:8 tabs  -- Caution federal law prohibits the transfer of this drug to any person other  than the person for whom it was prescribed.  May cause drowsiness.  Alcohol may intensify this effect.  Use care when operating dangerous machinery.  This prescription cannot be refilled.  This product contains acetaminophen.  Do not use  with any other product containing acetaminophen to prevent possible liver damage.  Using more of this medication than prescribed may cause serious breathing problems.    -- Indication: For pain medications    lisinopril 20 mg oral tablet  -- 1 tab(s) by mouth once a day  -- Indication: For Home med     metFORMIN 1000 mg oral tablet  -- 1 tab(s) by mouth 2 times a day  -- Indication: For Home med     RisperDAL 2 mg oral tablet  -- 1 tab(s) by mouth 2 times a day  -- Indication: For Home med     lamivudine-zidovudine 150 mg-300 mg oral tablet  -- 1 tab(s) by mouth 2 times a day  -- Indication: For Home med

## 2018-01-26 NOTE — DISCHARGE NOTE ADULT - CARE PROVIDERS DIRECT ADDRESSES
[email or web address]

## 2018-01-26 NOTE — DISCHARGE NOTE ADULT - PLAN OF CARE
follow up follow up in the office w/ Dr. Jean. Also follow up in the office w/ Dr. Costa. Take antibiotics as prescribed by plastic surgery team. Can take pain medication PRN. follow up in the office w/ Dr. Jean. Also follow up in the office w/ Dr. Costa. Take antibiotics as prescribed by plastic surgery team. Can take pain medication as needed. follow up in the office w/ Dr. Jean. Also follow up in the office w/ Dr. Costa. Take antibiotics as prescribed by plastic surgery team. Can take pain medication as needed.    Please empty the drains daily and record the output from the drains.

## 2018-01-26 NOTE — PROGRESS NOTE ADULT - ASSESSMENT
58 yo female alli R breast ca, s/p chemotherapy, presents for elective mastectomy with expander placement    - telemetry level of care  - clears tonight, diet in am  - IV Ancef until discharge  - IV Dilaudid, Tylenol prn  - anti-nausea meds  - home HIV/HTN meds, holding ASA (restart on 1/27), holding metformin  - ISS  - DVT ppx  - pulm toilet/IS 58 yo female weitony R breast ca, s/p chemotherapy, presents for elective mastectomy with expander placement  - telemetry level of care  - reg diet   - IV Ancef until discharge  - home HIV/HTN meds, holding ASA (restart on 1/27), holding metformin  - ISS  - DVT ppx  - pulm toilet/IS

## 2018-01-26 NOTE — PROGRESS NOTE ADULT - SUBJECTIVE AND OBJECTIVE BOX
Ortho Progress Note    Subjective:  57y Female s/p R mastectomy and recon w TE, POD 1 . Patient seen and examined, doing well, pain endorsed but controlled. No acute events overnight. Denies CP/SOB/N/V      Objective:    Vital Signs Last 24 Hrs  T(C): 36.9 (26 Jan 2018 06:02), Max: 36.9 (26 Jan 2018 06:02)  T(F): 98.4 (26 Jan 2018 06:02), Max: 98.4 (26 Jan 2018 06:02)  HR: 86 (26 Jan 2018 04:51) (78 - 86)  BP: 146/86 (26 Jan 2018 04:51) (133/82 - 167/100)  BP(mean): 110 (26 Jan 2018 04:51) (100 - 127)  RR: 17 (26 Jan 2018 04:51) (7 - 19)  SpO2: 100% (26 Jan 2018 04:51) (99% - 100%)    NAD, AOx3, comfortable    Dressing: C/D/I  Sensation: SILT                             12.6   6.4   )-----------( 199      ( 26 Jan 2018 06:05 )             38.1         A/P:  57y Female s/p above  -Stable  -Pain Control  -care per primary team  -Dispo: ok to discharge from plastics prospective       Mathew Leger MD, PGY-1

## 2018-01-26 NOTE — DISCHARGE NOTE ADULT - HOSPITAL COURSE
58 yo female with R breast ca, s/p chemotherapy, presents for elective mastectomy with expander placement. Underwent modified radical mastectomy w/ drain placement x 2. Discharged home in stable condition. 58 yo female with R breast ca, s/p chemotherapy, presents for elective mastectomy with expander placement. Underwent modified radical mastectomy w/ drain placement x 2 and tissue expander. Tolerated the procedure well. Post-operative course was uncomplicated. She os discharged home in stable condition with instructions for outpatient follow up.

## 2018-01-26 NOTE — DISCHARGE NOTE ADULT - NS AS ACTIVITY OBS
Showering allowed/Walking-Indoors allowed/No Heavy lifting/straining/Walking-Outdoors allowed/Stairs allowed/Do not make important decisions/Do not drive or operate machinery/Sex allowed

## 2018-01-26 NOTE — DISCHARGE NOTE ADULT - CARE PLAN
Principal Discharge DX:	Breast cancer  Goal:	follow up  Assessment and plan of treatment:	follow up in the office w/ Dr. Jean. Also follow up in the office w/ Dr. Costa. Take antibiotics as prescribed by plastic surgery team. Can take pain medication PRN. Principal Discharge DX:	Breast cancer  Goal:	follow up  Assessment and plan of treatment:	follow up in the office w/ Dr. Jean. Also follow up in the office w/ Dr. Costa. Take antibiotics as prescribed by plastic surgery team. Can take pain medication as needed. Principal Discharge DX:	Breast cancer  Goal:	follow up  Assessment and plan of treatment:	follow up in the office w/ Dr. Jean. Also follow up in the office w/ Dr. Costa. Take antibiotics as prescribed by plastic surgery team. Can take pain medication as needed.    Please empty the drains daily and record the output from the drains.

## 2018-01-26 NOTE — PROGRESS NOTE ADULT - SUBJECTIVE AND OBJECTIVE BOX
O/N:  POC wnl, passed TOV. VSS. OCTAVIO O/N:  POC wnl, passed TOV. VSS. OCTAVIO      Vital Signs Last 24 Hrs  T(C): 36.9 (26 Jan 2018 06:02), Max: 36.9 (26 Jan 2018 06:02)  T(F): 98.4 (26 Jan 2018 06:02), Max: 98.4 (26 Jan 2018 06:02)  HR: 86 (26 Jan 2018 04:51) (78 - 86)  BP: 146/86 (26 Jan 2018 04:51) (133/82 - 167/100)  BP(mean): 110 (26 Jan 2018 04:51) (100 - 127)  RR: 17 (26 Jan 2018 04:51) (7 - 19)  SpO2: 100% (26 Jan 2018 04:51) (99% - 100%)      I&O's Summary    25 Jan 2018 07:01  -  26 Jan 2018 06:20  --------------------------------------------------------  IN: 760 mL / OUT: 450 mL / NET: 310 mL        Gen: NAD   Chest: dressing c/d/i   ISAIAH x 2 sero sanguinous

## 2018-01-27 VITALS
RESPIRATION RATE: 16 BRPM | TEMPERATURE: 97 F | SYSTOLIC BLOOD PRESSURE: 154 MMHG | DIASTOLIC BLOOD PRESSURE: 99 MMHG | OXYGEN SATURATION: 97 % | HEART RATE: 94 BPM

## 2018-01-27 LAB
ANION GAP SERPL CALC-SCNC: 14 MMOL/L — SIGNIFICANT CHANGE UP (ref 5–17)
BUN SERPL-MCNC: 12 MG/DL — SIGNIFICANT CHANGE UP (ref 7–23)
CALCIUM SERPL-MCNC: 9.4 MG/DL — SIGNIFICANT CHANGE UP (ref 8.4–10.5)
CHLORIDE SERPL-SCNC: 103 MMOL/L — SIGNIFICANT CHANGE UP (ref 96–108)
CO2 SERPL-SCNC: 22 MMOL/L — SIGNIFICANT CHANGE UP (ref 22–31)
CREAT SERPL-MCNC: 0.63 MG/DL — SIGNIFICANT CHANGE UP (ref 0.5–1.3)
GLUCOSE BLDC GLUCOMTR-MCNC: 139 MG/DL — HIGH (ref 70–99)
GLUCOSE BLDC GLUCOMTR-MCNC: 150 MG/DL — HIGH (ref 70–99)
GLUCOSE SERPL-MCNC: 162 MG/DL — HIGH (ref 70–99)
MAGNESIUM SERPL-MCNC: 1.9 MG/DL — SIGNIFICANT CHANGE UP (ref 1.6–2.6)
PHOSPHATE SERPL-MCNC: 2.7 MG/DL — SIGNIFICANT CHANGE UP (ref 2.5–4.5)
POTASSIUM SERPL-MCNC: 3.3 MMOL/L — LOW (ref 3.5–5.3)
POTASSIUM SERPL-SCNC: 3.3 MMOL/L — LOW (ref 3.5–5.3)
SODIUM SERPL-SCNC: 139 MMOL/L — SIGNIFICANT CHANGE UP (ref 135–145)

## 2018-01-27 RX ORDER — SODIUM,POTASSIUM PHOSPHATES 278-250MG
1 POWDER IN PACKET (EA) ORAL ONCE
Qty: 0 | Refills: 0 | Status: DISCONTINUED | OUTPATIENT
Start: 2018-01-27 | End: 2018-01-27

## 2018-01-27 RX ORDER — POTASSIUM CHLORIDE 20 MEQ
40 PACKET (EA) ORAL
Qty: 0 | Refills: 0 | Status: COMPLETED | OUTPATIENT
Start: 2018-01-27 | End: 2018-01-27

## 2018-01-27 RX ADMIN — HEPARIN SODIUM 5000 UNIT(S): 5000 INJECTION INTRAVENOUS; SUBCUTANEOUS at 01:00

## 2018-01-27 RX ADMIN — Medication 2000 MILLIGRAM(S): at 05:14

## 2018-01-27 RX ADMIN — LISINOPRIL 20 MILLIGRAM(S): 2.5 TABLET ORAL at 05:14

## 2018-01-27 RX ADMIN — HYDROMORPHONE HYDROCHLORIDE 1 MILLIGRAM(S): 2 INJECTION INTRAMUSCULAR; INTRAVENOUS; SUBCUTANEOUS at 07:33

## 2018-01-27 RX ADMIN — RISPERIDONE 2 MILLIGRAM(S): 4 TABLET ORAL at 09:55

## 2018-01-27 RX ADMIN — HYDROMORPHONE HYDROCHLORIDE 1 MILLIGRAM(S): 2 INJECTION INTRAMUSCULAR; INTRAVENOUS; SUBCUTANEOUS at 07:03

## 2018-01-27 RX ADMIN — Medication 40 MILLIEQUIVALENT(S): at 10:42

## 2018-01-27 RX ADMIN — Medication 1250 MILLIGRAM(S): at 09:55

## 2018-01-27 RX ADMIN — HEPARIN SODIUM 5000 UNIT(S): 5000 INJECTION INTRAVENOUS; SUBCUTANEOUS at 09:55

## 2018-01-27 NOTE — PROGRESS NOTE ADULT - SUBJECTIVE AND OBJECTIVE BOX
Pt seen and examined. Doing well. No acute events o/n. Stayed for pain control.    T(C): 37.2 (01-27-18 @ 05:41), Max: 37.5 (01-26-18 @ 13:39)  T(F): 98.9 (01-27-18 @ 05:41), Max: 99.5 (01-26-18 @ 13:39)  HR: 88 (01-27-18 @ 05:41) (65 - 88)  BP: 143/87 (01-27-18 @ 05:41) (133/65 - 146/84)  RR: 16 (01-27-18 @ 05:41) (16 - 17)  SpO2: 98% (01-27-18 @ 05:41) (95% - 99%)      26 Jan 2018 07:01  -  27 Jan 2018 07:00  --------------------------------------------------------  IN:    Oral Fluid: 120 mL  Total IN: 120 mL    OUT:    Bulb: 102.5 mL    Bulb: 72.5 mL    Voided: 1000 mL  Total OUT: 1175 mL    Total NET: -1055 mL          Exam:  Dressing c/d/i  R breast soft, no palpable collections.  JPs serosanguinous.                           12.6   6.4   )-----------( 199      ( 26 Jan 2018 06:05 )             38.1     01-27    139  |  103  |  12  ----------------------------<  162<H>  3.3<L>   |  22  |  0.63    Ca    9.4      27 Jan 2018 07:05  Phos  2.7     01-27  Mg     1.9     01-27        Plan:

## 2018-01-27 NOTE — PROGRESS NOTE ADULT - ASSESSMENT
58 yo female alli R breast ca, s/p chemotherapy, presents for elective mastectomy with expander placement    - telemetry level of care  - clears tonight, diet in am  - IV Ancef until discharge  - IV Dilaudid, Tylenol prn  - anti-nausea meds  - home HIV/HTN meds, holding ASA (restart on 1/27), holding metformin  - ISS  - DVT ppx  - pulm toilet/IS

## 2018-01-29 PROBLEM — I34.0 NONRHEUMATIC MITRAL (VALVE) INSUFFICIENCY: Chronic | Status: ACTIVE | Noted: 2018-01-24

## 2018-01-29 PROBLEM — C50.919 MALIGNANT NEOPLASM OF UNSPECIFIED SITE OF UNSPECIFIED FEMALE BREAST: Chronic | Status: ACTIVE | Noted: 2018-01-24

## 2018-01-29 PROBLEM — I42.9 CARDIOMYOPATHY, UNSPECIFIED: Chronic | Status: ACTIVE | Noted: 2018-01-24

## 2018-01-29 PROBLEM — E78.5 HYPERLIPIDEMIA, UNSPECIFIED: Chronic | Status: ACTIVE | Noted: 2018-01-24

## 2018-01-31 ENCOUNTER — APPOINTMENT (OUTPATIENT)
Dept: PLASTIC SURGERY | Facility: CLINIC | Age: 58
End: 2018-01-31
Payer: MEDICAID

## 2018-01-31 ENCOUNTER — APPOINTMENT (OUTPATIENT)
Dept: BREAST CENTER | Facility: CLINIC | Age: 58
End: 2018-01-31
Payer: MEDICAID

## 2018-01-31 VITALS
WEIGHT: 165 LBS | DIASTOLIC BLOOD PRESSURE: 95 MMHG | TEMPERATURE: 98.5 F | HEIGHT: 64 IN | SYSTOLIC BLOOD PRESSURE: 154 MMHG | BODY MASS INDEX: 28.17 KG/M2 | HEART RATE: 100 BPM

## 2018-01-31 DIAGNOSIS — R93.3 ABNORMAL FINDINGS ON DIAGNOSTIC IMAGING OF OTHER PARTS OF DIGESTIVE TRACT: ICD-10-CM

## 2018-01-31 PROCEDURE — 99024 POSTOP FOLLOW-UP VISIT: CPT

## 2018-02-01 ENCOUNTER — APPOINTMENT (OUTPATIENT)
Dept: PLASTIC SURGERY | Facility: CLINIC | Age: 58
End: 2018-02-01

## 2018-02-01 LAB — SURGICAL PATHOLOGY STUDY: SIGNIFICANT CHANGE UP

## 2018-02-02 DIAGNOSIS — Z79.82 LONG TERM (CURRENT) USE OF ASPIRIN: ICD-10-CM

## 2018-02-02 DIAGNOSIS — Z21 ASYMPTOMATIC HUMAN IMMUNODEFICIENCY VIRUS [HIV] INFECTION STATUS: ICD-10-CM

## 2018-02-02 DIAGNOSIS — Z92.21 PERSONAL HISTORY OF ANTINEOPLASTIC CHEMOTHERAPY: ICD-10-CM

## 2018-02-02 DIAGNOSIS — E11.9 TYPE 2 DIABETES MELLITUS WITHOUT COMPLICATIONS: ICD-10-CM

## 2018-02-02 DIAGNOSIS — I10 ESSENTIAL (PRIMARY) HYPERTENSION: ICD-10-CM

## 2018-02-02 DIAGNOSIS — E78.5 HYPERLIPIDEMIA, UNSPECIFIED: ICD-10-CM

## 2018-02-02 DIAGNOSIS — I34.0 NONRHEUMATIC MITRAL (VALVE) INSUFFICIENCY: ICD-10-CM

## 2018-02-02 DIAGNOSIS — I42.9 CARDIOMYOPATHY, UNSPECIFIED: ICD-10-CM

## 2018-02-02 DIAGNOSIS — C50.911 MALIGNANT NEOPLASM OF UNSPECIFIED SITE OF RIGHT FEMALE BREAST: ICD-10-CM

## 2018-02-02 DIAGNOSIS — Z79.84 LONG TERM (CURRENT) USE OF ORAL HYPOGLYCEMIC DRUGS: ICD-10-CM

## 2018-02-02 DIAGNOSIS — Z87.891 PERSONAL HISTORY OF NICOTINE DEPENDENCE: ICD-10-CM

## 2018-02-07 PROCEDURE — 80048 BASIC METABOLIC PNL TOTAL CA: CPT

## 2018-02-07 PROCEDURE — 85027 COMPLETE CBC AUTOMATED: CPT

## 2018-02-07 PROCEDURE — 82962 GLUCOSE BLOOD TEST: CPT

## 2018-02-07 PROCEDURE — 88305 TISSUE EXAM BY PATHOLOGIST: CPT

## 2018-02-07 PROCEDURE — C1789: CPT

## 2018-02-07 PROCEDURE — 36415 COLL VENOUS BLD VENIPUNCTURE: CPT

## 2018-02-07 PROCEDURE — A9541: CPT

## 2018-02-07 PROCEDURE — 88304 TISSUE EXAM BY PATHOLOGIST: CPT

## 2018-02-07 PROCEDURE — 83735 ASSAY OF MAGNESIUM: CPT

## 2018-02-07 PROCEDURE — 88307 TISSUE EXAM BY PATHOLOGIST: CPT

## 2018-02-07 PROCEDURE — 78195 LYMPH SYSTEM IMAGING: CPT

## 2018-02-07 PROCEDURE — 88331 PATH CONSLTJ SURG 1 BLK 1SPC: CPT

## 2018-02-07 PROCEDURE — 84100 ASSAY OF PHOSPHORUS: CPT

## 2018-02-07 PROCEDURE — 88341 IMHCHEM/IMCYTCHM EA ADD ANTB: CPT

## 2018-02-08 ENCOUNTER — APPOINTMENT (OUTPATIENT)
Dept: PLASTIC SURGERY | Facility: CLINIC | Age: 58
End: 2018-02-08
Payer: MEDICAID

## 2018-02-08 PROCEDURE — 99024 POSTOP FOLLOW-UP VISIT: CPT

## 2018-02-22 ENCOUNTER — APPOINTMENT (OUTPATIENT)
Dept: PLASTIC SURGERY | Facility: CLINIC | Age: 58
End: 2018-02-22
Payer: MEDICAID

## 2018-02-22 PROCEDURE — 99024 POSTOP FOLLOW-UP VISIT: CPT

## 2018-03-02 ENCOUNTER — APPOINTMENT (OUTPATIENT)
Age: 58
End: 2018-03-02

## 2018-03-08 ENCOUNTER — APPOINTMENT (OUTPATIENT)
Dept: PLASTIC SURGERY | Facility: CLINIC | Age: 58
End: 2018-03-08
Payer: MEDICAID

## 2018-03-08 ENCOUNTER — INPATIENT (INPATIENT)
Facility: HOSPITAL | Age: 58
LOS: 5 days | Discharge: HOME | DRG: 287 | End: 2018-03-14
Attending: INTERNAL MEDICINE | Admitting: INTERNAL MEDICINE
Payer: COMMERCIAL

## 2018-03-08 VITALS
SYSTOLIC BLOOD PRESSURE: 157 MMHG | TEMPERATURE: 98 F | DIASTOLIC BLOOD PRESSURE: 95 MMHG | HEART RATE: 110 BPM | WEIGHT: 154.1 LBS | RESPIRATION RATE: 20 BRPM | OXYGEN SATURATION: 96 %

## 2018-03-08 DIAGNOSIS — Z98.890 OTHER SPECIFIED POSTPROCEDURAL STATES: Chronic | ICD-10-CM

## 2018-03-08 DIAGNOSIS — F99 MENTAL DISORDER, NOT OTHERWISE SPECIFIED: ICD-10-CM

## 2018-03-08 DIAGNOSIS — I10 ESSENTIAL (PRIMARY) HYPERTENSION: ICD-10-CM

## 2018-03-08 DIAGNOSIS — I38 ENDOCARDITIS, VALVE UNSPECIFIED: ICD-10-CM

## 2018-03-08 DIAGNOSIS — C50.919 MALIGNANT NEOPLASM OF UNSPECIFIED SITE OF UNSPECIFIED FEMALE BREAST: ICD-10-CM

## 2018-03-08 DIAGNOSIS — B20 HUMAN IMMUNODEFICIENCY VIRUS [HIV] DISEASE: ICD-10-CM

## 2018-03-08 DIAGNOSIS — I50.23 ACUTE ON CHRONIC SYSTOLIC (CONGESTIVE) HEART FAILURE: ICD-10-CM

## 2018-03-08 LAB
ALBUMIN SERPL ELPH-MCNC: 3.6 G/DL — SIGNIFICANT CHANGE UP (ref 3.3–5)
ALP SERPL-CCNC: 57 U/L — SIGNIFICANT CHANGE UP (ref 40–120)
ALT FLD-CCNC: 43 U/L — SIGNIFICANT CHANGE UP (ref 10–45)
ANION GAP SERPL CALC-SCNC: 13 MMOL/L — SIGNIFICANT CHANGE UP (ref 5–17)
APTT BLD: 32.2 SEC — SIGNIFICANT CHANGE UP (ref 27.5–37.4)
AST SERPL-CCNC: 66 U/L — HIGH (ref 10–40)
BASOPHILS NFR BLD AUTO: 0.6 % — SIGNIFICANT CHANGE UP (ref 0–2)
BILIRUB SERPL-MCNC: 0.8 MG/DL — SIGNIFICANT CHANGE UP (ref 0.2–1.2)
BUN SERPL-MCNC: 15 MG/DL — SIGNIFICANT CHANGE UP (ref 7–23)
CALCIUM SERPL-MCNC: 9.1 MG/DL — SIGNIFICANT CHANGE UP (ref 8.4–10.5)
CHLORIDE SERPL-SCNC: 103 MMOL/L — SIGNIFICANT CHANGE UP (ref 96–108)
CK MB CFR SERPL CALC: 2.1 NG/ML — SIGNIFICANT CHANGE UP (ref 0–6.7)
CO2 SERPL-SCNC: 22 MMOL/L — SIGNIFICANT CHANGE UP (ref 22–31)
CREAT SERPL-MCNC: 0.81 MG/DL — SIGNIFICANT CHANGE UP (ref 0.5–1.3)
EOSINOPHIL NFR BLD AUTO: 0.6 % — SIGNIFICANT CHANGE UP (ref 0–6)
GLUCOSE BLDC GLUCOMTR-MCNC: 104 MG/DL — HIGH (ref 70–99)
GLUCOSE BLDC GLUCOMTR-MCNC: 112 MG/DL — HIGH (ref 70–99)
GLUCOSE SERPL-MCNC: 141 MG/DL — HIGH (ref 70–99)
HCT VFR BLD CALC: 41.8 % — SIGNIFICANT CHANGE UP (ref 34.5–45)
HGB BLD-MCNC: 13.8 G/DL — SIGNIFICANT CHANGE UP (ref 11.5–15.5)
INR BLD: 1.28 — HIGH (ref 0.88–1.16)
LYMPHOCYTES # BLD AUTO: 47.5 % — HIGH (ref 13–44)
MCHC RBC-ENTMCNC: 33 G/DL — SIGNIFICANT CHANGE UP (ref 32–36)
MCHC RBC-ENTMCNC: 34.4 PG — HIGH (ref 27–34)
MCV RBC AUTO: 104.2 FL — HIGH (ref 80–100)
MONOCYTES NFR BLD AUTO: 8.3 % — SIGNIFICANT CHANGE UP (ref 2–14)
NEUTROPHILS NFR BLD AUTO: 43 % — SIGNIFICANT CHANGE UP (ref 43–77)
NT-PROBNP SERPL-SCNC: 7293 PG/ML — HIGH (ref 0–300)
PLATELET # BLD AUTO: 177 K/UL — SIGNIFICANT CHANGE UP (ref 150–400)
POTASSIUM SERPL-MCNC: 3.6 MMOL/L — SIGNIFICANT CHANGE UP (ref 3.5–5.3)
POTASSIUM SERPL-SCNC: 3.6 MMOL/L — SIGNIFICANT CHANGE UP (ref 3.5–5.3)
PROT SERPL-MCNC: 7.8 G/DL — SIGNIFICANT CHANGE UP (ref 6–8.3)
PROTHROM AB SERPL-ACNC: 14.3 SEC — HIGH (ref 9.8–12.7)
RBC # BLD: 4.01 M/UL — SIGNIFICANT CHANGE UP (ref 3.8–5.2)
RBC # FLD: 14.1 % — SIGNIFICANT CHANGE UP (ref 10.3–16.9)
SODIUM SERPL-SCNC: 138 MMOL/L — SIGNIFICANT CHANGE UP (ref 135–145)
TROPONIN T SERPL-MCNC: 0.03 NG/ML — HIGH (ref 0–0.01)
TROPONIN T SERPL-MCNC: 0.03 NG/ML — HIGH (ref 0–0.01)
WBC # BLD: 3.6 K/UL — LOW (ref 3.8–10.5)
WBC # FLD AUTO: 3.6 K/UL — LOW (ref 3.8–10.5)

## 2018-03-08 PROCEDURE — 71046 X-RAY EXAM CHEST 2 VIEWS: CPT | Mod: 26

## 2018-03-08 PROCEDURE — 99285 EMERGENCY DEPT VISIT HI MDM: CPT

## 2018-03-08 PROCEDURE — 99024 POSTOP FOLLOW-UP VISIT: CPT

## 2018-03-08 PROCEDURE — 99223 1ST HOSP IP/OBS HIGH 75: CPT | Mod: AI

## 2018-03-08 PROCEDURE — 71275 CT ANGIOGRAPHY CHEST: CPT | Mod: 26

## 2018-03-08 PROCEDURE — 93306 TTE W/DOPPLER COMPLETE: CPT | Mod: 26

## 2018-03-08 RX ORDER — SODIUM CHLORIDE 9 MG/ML
1000 INJECTION, SOLUTION INTRAVENOUS
Qty: 0 | Refills: 0 | Status: DISCONTINUED | OUTPATIENT
Start: 2018-03-08 | End: 2018-03-14

## 2018-03-08 RX ORDER — DEXTROSE 50 % IN WATER 50 %
1 SYRINGE (ML) INTRAVENOUS ONCE
Qty: 0 | Refills: 0 | Status: DISCONTINUED | OUTPATIENT
Start: 2018-03-08 | End: 2018-03-14

## 2018-03-08 RX ORDER — DEXTROSE 50 % IN WATER 50 %
25 SYRINGE (ML) INTRAVENOUS ONCE
Qty: 0 | Refills: 0 | Status: DISCONTINUED | OUTPATIENT
Start: 2018-03-08 | End: 2018-03-14

## 2018-03-08 RX ORDER — ASPIRIN/CALCIUM CARB/MAGNESIUM 324 MG
325 TABLET ORAL ONCE
Qty: 0 | Refills: 0 | Status: COMPLETED | OUTPATIENT
Start: 2018-03-08 | End: 2018-03-08

## 2018-03-08 RX ORDER — LISINOPRIL 2.5 MG/1
1 TABLET ORAL
Qty: 0 | Refills: 0 | COMMUNITY

## 2018-03-08 RX ORDER — SODIUM CHLORIDE 9 MG/ML
1000 INJECTION INTRAMUSCULAR; INTRAVENOUS; SUBCUTANEOUS ONCE
Qty: 0 | Refills: 0 | Status: COMPLETED | OUTPATIENT
Start: 2018-03-08 | End: 2018-03-08

## 2018-03-08 RX ORDER — ASPIRIN/CALCIUM CARB/MAGNESIUM 324 MG
81 TABLET ORAL DAILY
Qty: 0 | Refills: 0 | Status: DISCONTINUED | OUTPATIENT
Start: 2018-03-09 | End: 2018-03-14

## 2018-03-08 RX ORDER — FUROSEMIDE 40 MG
40 TABLET ORAL EVERY 12 HOURS
Qty: 0 | Refills: 0 | Status: DISCONTINUED | OUTPATIENT
Start: 2018-03-08 | End: 2018-03-12

## 2018-03-08 RX ORDER — FUROSEMIDE 40 MG
20 TABLET ORAL EVERY 12 HOURS
Qty: 0 | Refills: 0 | Status: DISCONTINUED | OUTPATIENT
Start: 2018-03-08 | End: 2018-03-08

## 2018-03-08 RX ORDER — ACETAMINOPHEN 500 MG
650 TABLET ORAL ONCE
Qty: 0 | Refills: 0 | Status: COMPLETED | OUTPATIENT
Start: 2018-03-08 | End: 2018-03-08

## 2018-03-08 RX ORDER — INSULIN LISPRO 100/ML
VIAL (ML) SUBCUTANEOUS
Qty: 0 | Refills: 0 | Status: DISCONTINUED | OUTPATIENT
Start: 2018-03-08 | End: 2018-03-14

## 2018-03-08 RX ORDER — SACUBITRIL AND VALSARTAN 24; 26 MG/1; MG/1
1 TABLET, FILM COATED ORAL
Qty: 0 | Refills: 0 | Status: DISCONTINUED | OUTPATIENT
Start: 2018-03-08 | End: 2018-03-08

## 2018-03-08 RX ORDER — HEPARIN SODIUM 5000 [USP'U]/ML
5000 INJECTION INTRAVENOUS; SUBCUTANEOUS EVERY 8 HOURS
Qty: 0 | Refills: 0 | Status: DISCONTINUED | OUTPATIENT
Start: 2018-03-08 | End: 2018-03-12

## 2018-03-08 RX ORDER — GLUCAGON INJECTION, SOLUTION 0.5 MG/.1ML
1 INJECTION, SOLUTION SUBCUTANEOUS ONCE
Qty: 0 | Refills: 0 | Status: DISCONTINUED | OUTPATIENT
Start: 2018-03-08 | End: 2018-03-14

## 2018-03-08 RX ORDER — FUROSEMIDE 40 MG
40 TABLET ORAL ONCE
Qty: 0 | Refills: 0 | Status: COMPLETED | OUTPATIENT
Start: 2018-03-08 | End: 2018-03-08

## 2018-03-08 RX ORDER — DEXTROSE 50 % IN WATER 50 %
12.5 SYRINGE (ML) INTRAVENOUS ONCE
Qty: 0 | Refills: 0 | Status: DISCONTINUED | OUTPATIENT
Start: 2018-03-08 | End: 2018-03-14

## 2018-03-08 RX ORDER — RISPERIDONE 4 MG/1
1 TABLET ORAL
Qty: 0 | Refills: 0 | COMMUNITY

## 2018-03-08 RX ORDER — SACUBITRIL AND VALSARTAN 24; 26 MG/1; MG/1
1 TABLET, FILM COATED ORAL
Qty: 0 | Refills: 0 | Status: DISCONTINUED | OUTPATIENT
Start: 2018-03-08 | End: 2018-03-14

## 2018-03-08 RX ADMIN — Medication 1250 MILLIGRAM(S): at 22:20

## 2018-03-08 RX ADMIN — Medication 650 MILLIGRAM(S): at 21:17

## 2018-03-08 RX ADMIN — SODIUM CHLORIDE 1000 MILLILITER(S): 9 INJECTION INTRAMUSCULAR; INTRAVENOUS; SUBCUTANEOUS at 10:54

## 2018-03-08 RX ADMIN — Medication 40 MILLIGRAM(S): at 12:57

## 2018-03-08 RX ADMIN — Medication 325 MILLIGRAM(S): at 12:55

## 2018-03-08 RX ADMIN — Medication 650 MILLIGRAM(S): at 22:17

## 2018-03-08 RX ADMIN — HEPARIN SODIUM 5000 UNIT(S): 5000 INJECTION INTRAVENOUS; SUBCUTANEOUS at 21:17

## 2018-03-08 RX ADMIN — Medication 40 MILLIGRAM(S): at 17:31

## 2018-03-08 RX ADMIN — SACUBITRIL AND VALSARTAN 1 TABLET(S): 24; 26 TABLET, FILM COATED ORAL at 19:02

## 2018-03-08 NOTE — H&P ADULT - PROBLEM SELECTOR PLAN 2
-Per prior documention; patient with history of severe mitral valve regurgitation  -Patient reports history of "leaky valve"  -F/U Echo to further assess. -Moderate to severe MR and moderate to severe TR on Echo  -Will need aggressive diuresis: Lasix 40 mg IV BID. With plan for repeat limited Echo on Saturday to re assess MR  -If MR persistent; will consider JACY and Right and Left Heart Catheterization on Monday.

## 2018-03-08 NOTE — H&P ADULT - PROBLEM SELECTOR PLAN 5
-Followed by Dr. Costa  -Patient reports she is suppose see the radiation specialist tomorrow to discuss further treatment

## 2018-03-08 NOTE — ED ADULT TRIAGE NOTE - OTHER COMPLAINTS
c/o of worsening shortness of breath on exertion x few weeks with left arm pain that started today. Recent right sided masectomy in Jan on chemo for Breast CA and Hx CHF

## 2018-03-08 NOTE — H&P ADULT - PROBLEM SELECTOR PLAN 1
-BNP 7,293. EF: 40% by prior Nuclear Stress Test on prior H+P (Allscripts currently not working)  -Daily weights, strict I/Os  -Echo ordered to assess EF and valvular disease  -CTA PE Protocol consistent with CHF  -s/p Lasix 40 mg IV x one dose in ED. Ordered for Lasix 20 mg IV BID. Will continue home Entresto BID  -Patient is a POOR HISTORIAN; unsure of all her doctors; looked at scripts in sunrise and called prescribers ([facility]). Including Cardiologist Dr. Danilo Kimball (who has no record of her as a patient: [phone]). In addition called the office of second prescriber: [phone] and [phone] who stated she has never come to office). Her daughter is supposed to bring in the cards of her doctors. Also check Allscripts when working.  -Patient will likely need ischemic evaluation this admission (cardiac catheterization with optimized). -BNP 7,293. EF: 40% by prior Nuclear Stress Test on prior H+P (Allscripts currently not working)  -Daily weights, strict I/Os  - Preliminary Echo read from Dr. Guerin (3/8/2018): EF 20%, moderate to severe MR and moderate to severe TR. Small pericardial effusion. Left Atrial pressure severely elevated.   - s/p Lasix 40 mg IV x one dose in ED. Will order Lasix 40 mg IV BID; patient needs aggressive diuresis as discussed with Dr. Dominguez. Will plan on repeat limited Echo on Saturday to assess severity of MR with tentative plan for JACY and R+L heart catheterization if medically optimized.   -CTA PE Protocol consistent with CHF  - Will continue home Entresto BID per Dr. Dominguez  -Patient is a POOR HISTORIAN; unsure of all her doctors; looked at scripts in sunrise and called prescribers ([facility]). Including Cardiologist Dr. Danilo Kimball (who has no record of her as a patient: [phone]). In addition called the office of second prescriber: [phone] and [phone] who stated she has never come to office). Her daughter is supposed to bring in the cards of her doctors. Also check Allscripts when working.

## 2018-03-08 NOTE — ED ADULT NURSE NOTE - OBJECTIVE STATEMENT
58 y/o female c/o sob worse on exertion for the last few weeks but it has gotten worse and now has left arm pain. EKG done. pt had right sided mastectomy in January but reports experiencing SOB even before the surgery. PMHx HIV, breast CA, DM, HTN, HLD. denies chest pain, fever/chills, n/v/d, dizziness, loc,  syncope, recent travel, smoking.

## 2018-03-08 NOTE — H&P ADULT - PSYCHIATRIC COMMENTS
Reports alter ego and hallucinations intermittently: does not have any thoughts of hurting herself No thoughts of harming herself, does not appear anxious

## 2018-03-08 NOTE — H&P ADULT - ASSESSMENT
58 yo female, POOR HISTORIAN, former smoker, former drug abuser (quit 2006) with a PMHx of HIV diagnosed in 1997 (viral load unknown), schizophrenia? (was prescribed Risperdol in past but does not take), Asthma (prior ED visits/denies intubations), HTN, DMII,  severe mitral valve regurgitation,  cardiomyopathy (per prior H+P EF 40%, after stress test 28%: Allscripts currently not working), known right breast cancer diagnosed in 2017, s/p chemotherapy (finished 11/2017) s/p radical mastectomy w/ drain placement x 2 and tissue expander (1/26/2018) presented to [facility] ED (3/8/2018) in acute on chronic systolic CHF exacerbation.

## 2018-03-08 NOTE — H&P ADULT - PROBLEM SELECTOR PLAN 3
-Only taking Entresto BID at home currently  -Monitor BP trend. Ordered for Lasix 20 mg IV BID -Only taking Entresto BID at home currently  -Monitor BP trend. Ordered for Lasix 40 mg IV BID

## 2018-03-08 NOTE — H&P ADULT - HISTORY OF PRESENT ILLNESS
56 yo female with hx of HIV, known history of cardiomyopathy (EF 40%, after stress test 28%), severe mitral valve regurgitation, HTN, HLD, DM2, with known right breast cancer, s/p chemotherapy (finished 11/2017) s/p radical mastectomy w/ drain placement x 2 and tissue expander (1/26/2018) presented to [facility] ED (3/8/2018) with the complaint of SOB x two days and left arm discomfort since the AM.   Vital signs revealed: /95, RR: 20, Afebrile, HR:     Pertient lab values include WBC: 3.6, AST: 66, Troponin 0.03, BNP 7,293. 12 Lead EKG revealed Sinus Rhythm with new TWI in V5-V6. CTA PE Protocol revealed no PE.  Mild to moderate bilateral pleural effusions, small pericardial effusion, cardiomegaly, and increased right heart pressure, which may indicate a component of CHF. Correlate with echocardiogram. Patient was initially given 1L Fluid bolus which was discontinued once BNP level was reviewed for which patient received Lasix 40 mg IV x one dose. In addition pt received  mg PO x one dose. 58 yo female, POOR HISTORIAN, former smoker, former drug abuser (quit 2006) with a PMHx of HIV diagnosed in 1997 (viral load unknown), schizophrenia?(was prescribed Risperdol in past but does not take), Asthma (prior ED visits/denies intubations), HTN, DMII,  severe mitral valve regurgitation,  cardiomyopathy (per prior H+P EF 40%, after stress test 28%: All scripts currently not working), hx of right breast cancer diagnosed in 2017, s/p chemotherapy (finished 11/2017) s/p radical mastectomy w/ drain placement x 2 and tissue expander (1/26/2018) presented to [facility] ED (3/8/2018) with the complaint of progressively worsening SOB. Pt reports since she started chemotherapy last year she had experienced progressively worsening SOB. She reports she cannot walk more than 100 feet before become dyspneic and at times suffers from PND and orthopnea. Upon awakening this morning, she also developed left arm numbness/tingling/pain independent of activity (which is new for her). She denies dizziness, palpitations, chest pain, fever, chills, leg edema, headache, recent travel. Today she went to see her plastic surgeon Dr. Costa for an injection in preparation for upcoming reconstructive surgery and was referred to ED for SOB. Of note prior to recent mastectomy 1/2018 patient received cardiac clearance, completed Nuclear Stress Test, was told there no blockages in her heart but ejection fraction was low and was started on Entresto therapy.  Upon arrival to ED; Vital signs revealed: /95, RR: 20, Afebrile, HR: . 12 Lead EKG TWI infero-laterally (changed from prior EKG). Pertinent lab values include WBC: 3.6, AST: 66, Troponin 0.03, BNP 7,293. STAT CTA PE Protocol revealed no PE.  Mild to moderate bilateral pleural effusions, small pericardial effusion, cardiomegaly, and increased right heart pressure, which may indicate a component of CHF. Patient was initially given 1L Fluid bolus which was discontinued once BNP level was reviewed for which patient received Lasix 40 mg IV x one dose. Patient is now admitted to [facility] for acute on chronic systolic CHF exacerbation. 56 yo female, POOR HISTORIAN, former smoker, former drug abuser (quit 2006) with a PMHx of HIV diagnosed in 1997 (viral load unknown), schizophrenia?(was prescribed Risperdol in past but does not take), Asthma (prior ED visits/denies intubations), HTN, DMII,  severe mitral valve regurgitation,  cardiomyopathy (per prior H+P EF 40%, after stress test 28%: All scripts currently not working), hx of right breast cancer diagnosed in 2017, s/p chemotherapy (finished 11/2017) s/p radical mastectomy w/ drain placement x 2 and tissue expander (1/26/2018) presented to [facility] ED (3/8/2018) with the complaint of progressively worsening SOB. Pt reports since she started chemotherapy last year she had experienced progressively worsening SOB. She reports she cannot walk more than 100 feet before become dyspneic and at times suffers from PND and orthopnea. Upon awakening this morning, she also developed left arm numbness/tingling/pain independent of activity (which is new for her). She denies dizziness, palpitations, chest pain, fever, chills, leg edema, headache, recent travel. Today she went to see her plastic surgeon Dr. Costa for an injection in preparation for upcoming reconstructive surgery and was referred to ED for SOB. Of note prior to recent mastectomy 1/2018 patient received cardiac clearance from  , completed Nuclear Stress Test, was told there no blockages in her heart but ejection fraction was low and was started on Entresto therapy.  Upon arrival to ED; Vital signs revealed: /95, RR: 20, Afebrile, HR: . 12 Lead EKG TWI infero-laterally (changed from prior EKG). Pertinent lab values include WBC: 3.6, AST: 66, Troponin 0.03, BNP 7,293. STAT CTA PE Protocol revealed no PE.  Mild to moderate bilateral pleural effusions, small pericardial effusion, cardiomegaly, and increased right heart pressure, which may indicate a component of CHF. Patient was initially given 1L Fluid bolus which was discontinued once BNP level was reviewed for which patient received Lasix 40 mg IV x one dose. Patient is now admitted to [facility] for acute on chronic systolic CHF exacerbation.

## 2018-03-08 NOTE — ED PROVIDER NOTE - ATTENDING CONTRIBUTION TO CARE
I have seen and evaluated the pt, and I agree with the documentation/care/plan executed by MANPREET Santiago.

## 2018-03-08 NOTE — ED ADULT NURSE NOTE - CHPI ED SYMPTOMS NEG
no fever/no headache/no body aches/no chest pain/no cough/no wheezing/no hemoptysis/no chills/no diaphoresis/no edema

## 2018-03-08 NOTE — ED PROVIDER NOTE - MEDICAL DECISION MAKING DETAILS
58 y/o f hx HIV +, HTN, DM, HLD, breast CA s/p mastectomy and chemo presents c/o shortness of breath for the past 2 days with left arm discomfort since this morning.  Pt with normal lung sounds, cxr neg, not hypoxic, although noted to be tachycardic, troponin elevated at 0.03, BNP is 7293.  Pt given 325mg aspirin, will get CTA chest r/o PE and plan for discussion with cards attending for admission.

## 2018-03-08 NOTE — ED PROVIDER NOTE - OBJECTIVE STATEMENT
58 y/o f hx HIV +, HTN, DM, HLD, breast CA s/p mastectomy and chemo presents c/o shortness of breath for the past 2 days with left arm discomfort since this morning.  Pt stating she was told by a cardiologist in [place] in January that "one of my valves is leaky" although hasn't followed up since then.  Pt denies cough, fever, CP, n/v, diaphoresis, all other ROS negative.

## 2018-03-08 NOTE — H&P ADULT - PROBLEM SELECTOR PLAN 6
-Patient has been Risperdol 2 mg BID in past  -Patient reports she has an alter Ego and experiences hallucinations intermittently.   -Patient denies any current thoughts of harming herself. Cooperative during interview.     Dispo: ECHO and IV diuresis. Will need ischemic work up this admission. F/U troponin @ 4 PM. Obtain collateral info from daughter when she arrives. -Patient has been Risperdol 2 mg BID in past  -Patient reports she has an alter Ego and experiences hallucinations intermittently.   -Patient denies any current thoughts of harming herself. Cooperative during interview.     Dispo:  Will need ischemic work up this admission. F/U troponin @ 4 PM. Obtain collateral info from daughter when she arrives.

## 2018-03-08 NOTE — H&P ADULT - PROBLEM SELECTOR PLAN 4
-HIV Diagnosed in 1997. Can't recall her doctor  -Currently taking Combivir and Viracept at home   -F/U AM viral load  -OF NOTE: patient reports her family does NOT know she has HIV and requests this is NOT brought up infront of her family. -HIV Diagnosed in 1997. Can't recall her doctor  -Currently taking Combivir and Viracept at home   -F/U AM viral load and CD4 count  -OF NOTE: patient reports her family does NOT know she has HIV and requests this is NOT brought up infront of her family.

## 2018-03-08 NOTE — H&P ADULT - NSHPSOCIALHISTORY_GEN_ALL_CORE
Former smoker  Former drug abuser: Cocaine and Marijuana quit 12 years ago  Denies ETOH use past or present  Lives with her son and daughter

## 2018-03-09 ENCOUNTER — APPOINTMENT (OUTPATIENT)
Age: 58
End: 2018-03-09

## 2018-03-09 LAB
ANION GAP SERPL CALC-SCNC: 13 MMOL/L — SIGNIFICANT CHANGE UP (ref 5–17)
BUN SERPL-MCNC: 12 MG/DL — SIGNIFICANT CHANGE UP (ref 7–23)
CALCIUM SERPL-MCNC: 8.9 MG/DL — SIGNIFICANT CHANGE UP (ref 8.4–10.5)
CHLORIDE SERPL-SCNC: 105 MMOL/L — SIGNIFICANT CHANGE UP (ref 96–108)
CHOLEST SERPL-MCNC: 150 MG/DL — SIGNIFICANT CHANGE UP (ref 10–199)
CO2 SERPL-SCNC: 24 MMOL/L — SIGNIFICANT CHANGE UP (ref 22–31)
CREAT SERPL-MCNC: 0.68 MG/DL — SIGNIFICANT CHANGE UP (ref 0.5–1.3)
GLUCOSE BLDC GLUCOMTR-MCNC: 109 MG/DL — HIGH (ref 70–99)
GLUCOSE BLDC GLUCOMTR-MCNC: 114 MG/DL — HIGH (ref 70–99)
GLUCOSE BLDC GLUCOMTR-MCNC: 145 MG/DL — HIGH (ref 70–99)
GLUCOSE BLDC GLUCOMTR-MCNC: 161 MG/DL — HIGH (ref 70–99)
GLUCOSE SERPL-MCNC: 111 MG/DL — HIGH (ref 70–99)
HBA1C BLD-MCNC: 5.1 % — SIGNIFICANT CHANGE UP (ref 4–5.6)
HCT VFR BLD CALC: 40.4 % — SIGNIFICANT CHANGE UP (ref 34.5–45)
HDLC SERPL-MCNC: 49 MG/DL — SIGNIFICANT CHANGE UP (ref 40–125)
HGB BLD-MCNC: 13 G/DL — SIGNIFICANT CHANGE UP (ref 11.5–15.5)
LIPID PNL WITH DIRECT LDL SERPL: 86 MG/DL — SIGNIFICANT CHANGE UP
MAGNESIUM SERPL-MCNC: 1.7 MG/DL — SIGNIFICANT CHANGE UP (ref 1.6–2.6)
MCHC RBC-ENTMCNC: 32.2 G/DL — SIGNIFICANT CHANGE UP (ref 32–36)
MCHC RBC-ENTMCNC: 33.6 PG — SIGNIFICANT CHANGE UP (ref 27–34)
MCV RBC AUTO: 104.4 FL — HIGH (ref 80–100)
PLATELET # BLD AUTO: 159 K/UL — SIGNIFICANT CHANGE UP (ref 150–400)
POTASSIUM SERPL-MCNC: 3.4 MMOL/L — LOW (ref 3.5–5.3)
POTASSIUM SERPL-SCNC: 3.4 MMOL/L — LOW (ref 3.5–5.3)
RBC # BLD: 3.87 M/UL — SIGNIFICANT CHANGE UP (ref 3.8–5.2)
RBC # FLD: 14.2 % — SIGNIFICANT CHANGE UP (ref 10.3–16.9)
SODIUM SERPL-SCNC: 142 MMOL/L — SIGNIFICANT CHANGE UP (ref 135–145)
TOTAL CHOLESTEROL/HDL RATIO MEASUREMENT: 3.1 RATIO — LOW (ref 3.3–7.1)
TRIGL SERPL-MCNC: 76 MG/DL — SIGNIFICANT CHANGE UP (ref 10–149)
WBC # BLD: 3.3 K/UL — LOW (ref 3.8–10.5)
WBC # FLD AUTO: 3.3 K/UL — LOW (ref 3.8–10.5)

## 2018-03-09 PROCEDURE — 99233 SBSQ HOSP IP/OBS HIGH 50: CPT

## 2018-03-09 PROCEDURE — 71045 X-RAY EXAM CHEST 1 VIEW: CPT | Mod: 26

## 2018-03-09 RX ORDER — MAGNESIUM SULFATE 500 MG/ML
1 VIAL (ML) INJECTION ONCE
Qty: 0 | Refills: 0 | Status: COMPLETED | OUTPATIENT
Start: 2018-03-09 | End: 2018-03-09

## 2018-03-09 RX ORDER — HYDRALAZINE HCL 50 MG
10 TABLET ORAL THREE TIMES A DAY
Qty: 0 | Refills: 0 | Status: DISCONTINUED | OUTPATIENT
Start: 2018-03-09 | End: 2018-03-14

## 2018-03-09 RX ORDER — ZOLPIDEM TARTRATE 10 MG/1
5 TABLET ORAL ONCE
Qty: 0 | Refills: 0 | Status: DISCONTINUED | OUTPATIENT
Start: 2018-03-09 | End: 2018-03-09

## 2018-03-09 RX ORDER — ACETAMINOPHEN 500 MG
650 TABLET ORAL ONCE
Qty: 0 | Refills: 0 | Status: COMPLETED | OUTPATIENT
Start: 2018-03-09 | End: 2018-03-09

## 2018-03-09 RX ORDER — POTASSIUM CHLORIDE 20 MEQ
40 PACKET (EA) ORAL EVERY 4 HOURS
Qty: 0 | Refills: 0 | Status: COMPLETED | OUTPATIENT
Start: 2018-03-09 | End: 2018-03-09

## 2018-03-09 RX ADMIN — Medication 650 MILLIGRAM(S): at 02:00

## 2018-03-09 RX ADMIN — HEPARIN SODIUM 5000 UNIT(S): 5000 INJECTION INTRAVENOUS; SUBCUTANEOUS at 05:33

## 2018-03-09 RX ADMIN — HEPARIN SODIUM 5000 UNIT(S): 5000 INJECTION INTRAVENOUS; SUBCUTANEOUS at 14:08

## 2018-03-09 RX ADMIN — SACUBITRIL AND VALSARTAN 1 TABLET(S): 24; 26 TABLET, FILM COATED ORAL at 18:14

## 2018-03-09 RX ADMIN — Medication 1: at 21:55

## 2018-03-09 RX ADMIN — Medication 1250 MILLIGRAM(S): at 18:14

## 2018-03-09 RX ADMIN — Medication 100 GRAM(S): at 08:58

## 2018-03-09 RX ADMIN — Medication 1250 MILLIGRAM(S): at 05:33

## 2018-03-09 RX ADMIN — Medication 40 MILLIEQUIVALENT(S): at 14:08

## 2018-03-09 RX ADMIN — HEPARIN SODIUM 5000 UNIT(S): 5000 INJECTION INTRAVENOUS; SUBCUTANEOUS at 21:54

## 2018-03-09 RX ADMIN — Medication 40 MILLIGRAM(S): at 05:33

## 2018-03-09 RX ADMIN — Medication 10 MILLIGRAM(S): at 21:54

## 2018-03-09 RX ADMIN — Medication 650 MILLIGRAM(S): at 01:00

## 2018-03-09 RX ADMIN — Medication 40 MILLIGRAM(S): at 18:14

## 2018-03-09 RX ADMIN — Medication 650 MILLIGRAM(S): at 20:45

## 2018-03-09 RX ADMIN — Medication 40 MILLIEQUIVALENT(S): at 10:53

## 2018-03-09 RX ADMIN — Medication 650 MILLIGRAM(S): at 19:11

## 2018-03-09 RX ADMIN — ZOLPIDEM TARTRATE 5 MILLIGRAM(S): 10 TABLET ORAL at 23:55

## 2018-03-09 RX ADMIN — SACUBITRIL AND VALSARTAN 1 TABLET(S): 24; 26 TABLET, FILM COATED ORAL at 05:33

## 2018-03-09 RX ADMIN — Medication 81 MILLIGRAM(S): at 10:54

## 2018-03-09 NOTE — PROGRESS NOTE ADULT - SUBJECTIVE AND OBJECTIVE BOX
Interventional Cardiology PA Adult Progress Note    CC: "I still feel short of breath"    Subjective Assessment: Patient was seen and examined this AM and reported mild SOB. Denies CP.    12 point ROS otherwise negative except per subjective  	  MEDICATIONS:  furosemide   Injectable 40 milliGRAM(s) IV Push every 12 hours  sacubitril 24 mG/valsartan 26 mG 1 Tablet(s) Oral two times a day  nelfinavir 1250 milliGRAM(s) Oral two times a day  zidovudine 300 mG/lamiVUDine 150 mG 1 Tablet(s) Oral two times a day  insulin lispro (HumaLOG) corrective regimen sliding scale   SubCutaneous Before meals and at bedtime  aspirin enteric coated 81 milliGRAM(s) Oral daily  heparin  Injectable 5000 Unit(s) SubCutaneous every 8 hours  potassium chloride    Tablet ER 40 milliEquivalent(s) Oral every 4 hours    [PHYSICAL EXAM:  TELEMETRY:  T(C): 36.2 (03-09-18 @ 08:57), Max: 36.4 (03-09-18 @ 05:21)  HR: 93 (03-09-18 @ 12:41) (85 - 98)  BP: 139/93 (03-09-18 @ 12:41) (115/74 - 163/116)  RR: 16 (03-09-18 @ 12:41) (15 - 22)  SpO2: 99% (03-09-18 @ 12:41) (96% - 100%)  Wt(kg): --  I&O's Summary    08 Mar 2018 07:01  -  09 Mar 2018 07:00  --------------------------------------------------------  IN: 240 mL / OUT: 425 mL / NET: -185 mL    09 Mar 2018 07:01  -  09 Mar 2018 14:03  --------------------------------------------------------  IN: 540 mL / OUT: 820 mL / NET: -280 mL    Appearance: Normal	  HEENT:   Normal oral mucosa, PERRL, EOMI	  Neck: Supple, - JVD; No Carotid Bruit   Cardiovascular: Normal S1 S2, No JVD, No murmurs  Respiratory: Mild crackles at the bases b/l  Gastrointestinal:  Soft, Non-tender, + BS	  Skin: No rashes, No ecchymoses, No cyanosis  Extremities: Normal range of motion, No clubbing, cyanosis or edema  Vascular: Peripheral pulses palpable 2+ bilaterally  Neurologic: Non-focal  Psychiatry: A & O x 3, Mood & affect appropriate    LABS:	 	  CARDIAC MARKERS:                          13.0   3.3   )-----------( 159      ( 09 Mar 2018 05:45 )             40.4     03-09    142  |  105  |  12  ----------------------------<  111<H>  3.4<L>   |  24  |  0.68    Ca    8.9      09 Mar 2018 05:45  Mg     1.7     03-09    TPro  7.8  /  Alb  3.6  /  TBili  0.8  /  DBili  x   /  AST  66<H>  /  ALT  43  /  AlkPhos  57  03-08    HgA1c: Hemoglobin A1C, Whole Blood: 5.1 % (03-09 @ 05:45)    TSH:   PT/INR - ( 08 Mar 2018 10:41 )   PT: 14.3 sec;   INR: 1.28          PTT - ( 08 Mar 2018 10:41 )  PTT:32.2 sec Interventional Cardiology PA Adult Progress Note    CC: "I still feel short of breath"    Subjective Assessment: Patient was seen and examined this AM and reported mild SOB. Denies CP.    12 point ROS otherwise negative except per subjective  	  MEDICATIONS:  furosemide   Injectable 40 milliGRAM(s) IV Push every 12 hours  sacubitril 24 mG/valsartan 26 mG 1 Tablet(s) Oral two times a day  nelfinavir 1250 milliGRAM(s) Oral two times a day  zidovudine 300 mG/lamiVUDine 150 mG 1 Tablet(s) Oral two times a day  insulin lispro (HumaLOG) corrective regimen sliding scale   SubCutaneous Before meals and at bedtime  aspirin enteric coated 81 milliGRAM(s) Oral daily  heparin  Injectable 5000 Unit(s) SubCutaneous every 8 hours  potassium chloride    Tablet ER 40 milliEquivalent(s) Oral every 4 hours    [PHYSICAL EXAM:  TELEMETRY:  T(C): 36.2 (03-09-18 @ 08:57), Max: 36.4 (03-09-18 @ 05:21)  HR: 93 (03-09-18 @ 12:41) (85 - 98)  BP: 139/93 (03-09-18 @ 12:41) (115/74 - 163/116)  RR: 16 (03-09-18 @ 12:41) (15 - 22)  SpO2: 99% (03-09-18 @ 12:41) (96% - 100%)  Wt(kg): --  I&O's Summary    08 Mar 2018 07:01  -  09 Mar 2018 07:00  --------------------------------------------------------  IN: 240 mL / OUT: 425 mL / NET: -185 mL    09 Mar 2018 07:01  -  09 Mar 2018 14:03  --------------------------------------------------------  IN: 540 mL / OUT: 820 mL / NET: -280 mL    Appearance: Normal	  HEENT:   Normal oral mucosa, PERRL, EOMI	  Neck: Supple, + JVD; No Carotid Bruit   Cardiovascular: Normal S1 S2, +JVD, No murmurs  Respiratory: Mild crackles at the bases b/l  Gastrointestinal:  Soft, Non-tender, + BS	  Skin: No rashes, No ecchymoses, No cyanosis  Extremities: Normal range of motion, No clubbing, cyanosis or edema  Vascular: Peripheral pulses palpable 2+ bilaterally  Neurologic: Non-focal  Psychiatry: A & O x 3, Mood & affect appropriate    LABS:	 	  CARDIAC MARKERS:                          13.0   3.3   )-----------( 159      ( 09 Mar 2018 05:45 )             40.4     03-09    142  |  105  |  12  ----------------------------<  111<H>  3.4<L>   |  24  |  0.68    Ca    8.9      09 Mar 2018 05:45  Mg     1.7     03-09    TPro  7.8  /  Alb  3.6  /  TBili  0.8  /  DBili  x   /  AST  66<H>  /  ALT  43  /  AlkPhos  57  03-08    HgA1c: Hemoglobin A1C, Whole Blood: 5.1 % (03-09 @ 05:45)    TSH:   PT/INR - ( 08 Mar 2018 10:41 )   PT: 14.3 sec;   INR: 1.28          PTT - ( 08 Mar 2018 10:41 )  PTT:32.2 sec

## 2018-03-09 NOTE — PROGRESS NOTE ADULT - PROBLEM SELECTOR PLAN 2
Moderate to severe MR and TR on echo  - Will need aggressive diuresis: Lasix 40 mg IV BID. With plan for repeat limited Echo on Saturday to re assess MR. If MR persistent; will consider JACY and Right and Left Heart Catheterization on Monday.

## 2018-03-09 NOTE — PROGRESS NOTE ADULT - PROBLEM SELECTOR PLAN 5
Followed by Dr. Costa  - Patient reports she is suppose see the radiation specialist tomorrow to discuss further treatment. Left message at office to inform them why she is not keeping appointment, will try to see who cardiac cleared her for surgery and if she has been following with them.

## 2018-03-09 NOTE — PROGRESS NOTE ADULT - PROBLEM SELECTOR PLAN 4
HIV Diagnosed in 1997. Can't recall her doctor  - Currently taking Combivir and Viracept at home   - F/U viral load and CD4 count  OF NOTE: patient reports her family does NOT know she has HIV and requests this is NOT brought up in front of her family.

## 2018-03-09 NOTE — PROGRESS NOTE ADULT - PROBLEM SELECTOR PLAN 6
- Patient has been Risperdol 2 mg BID in past. Patient reports she has an alter Ego and experiences hallucinations intermittently. Patient currently denies SI and HI. Cooperative during interview.     Dispo: Pending clinical progression.

## 2018-03-09 NOTE — PROGRESS NOTE ADULT - PROBLEM SELECTOR PLAN 1
Mildly overloaded on exam today, BNP 7,293.  - Daily weights, strict I/Os  - Echo 3/8 revealed LV moderately dilated, severe global hypokinesis of LV with EF 20%, LA moderately dilated, mod-severe MR, mod-severe TR, mild pulmHTN with PAP 47mmHg, small pericardial effusion noted  - Continue lasix 40mg IV BID. Will plan on repeat limited Echo on Saturday to assess severity of MR with tentative plan for JACY and R+L heart catheterization if medically optimized.   - CTA PE Protocol consistent with CHF  - continue home entresto  - Patient is a POOR HISTORIAN; unsure of all her doctors; looked at scripts in [place] and called prescribers ([facility]). Including Cardiologist Dr. Danilo Kimball (who has no record of her as a patient: [phone]). In addition called the office of second prescriber: [phone] and [phone] who stated she has never come to office). Her daughter is supposed to bring in the cards of her doctors, will follow up. Only MD noted in [employer]scripts is her plastic surgeon. Mildly overloaded on exam today, BNP 7,293 on admission, cardiologist Julia Del Toro - recent echo 1/2018 revealed mod-severe MR with EF 40% and NST which revealed post stress EF 28%  - Daily weights, strict I/Os  - Echo 3/8 revealed LV moderately dilated, severe global hypokinesis of LV with EF 20%, LA moderately dilated, mod-severe MR, mod-severe TR, mild pulmHTN with PAP 47mmHg, small pericardial effusion noted  - Continue lasix 40mg IV BID. Will plan on repeat limited Echo on Saturday to assess severity of MR with tentative plan for JACY and R+L heart catheterization on Monday if medically optimized.   - repeat CXR today consistent with pulmonary vascular congestion  - CTA PE Protocol consistent with CHF  - continue home entresto, started hydralazine 10mg TID today with plan to add imdur tomorrow and BB if utox neg for cocaine  - Patient is a POOR HISTORIAN; unsure of all her doctors; looked at scripts in [place] and called prescribers ([facility]). Patient's cardiologist is Dr. Julia Del Toro.

## 2018-03-10 LAB
ANION GAP SERPL CALC-SCNC: 13 MMOL/L — SIGNIFICANT CHANGE UP (ref 5–17)
BUN SERPL-MCNC: 12 MG/DL — SIGNIFICANT CHANGE UP (ref 7–23)
CALCIUM SERPL-MCNC: 9.3 MG/DL — SIGNIFICANT CHANGE UP (ref 8.4–10.5)
CHLORIDE SERPL-SCNC: 104 MMOL/L — SIGNIFICANT CHANGE UP (ref 96–108)
CO2 SERPL-SCNC: 24 MMOL/L — SIGNIFICANT CHANGE UP (ref 22–31)
CREAT SERPL-MCNC: 0.72 MG/DL — SIGNIFICANT CHANGE UP (ref 0.5–1.3)
GLUCOSE BLDC GLUCOMTR-MCNC: 104 MG/DL — HIGH (ref 70–99)
GLUCOSE BLDC GLUCOMTR-MCNC: 111 MG/DL — HIGH (ref 70–99)
GLUCOSE BLDC GLUCOMTR-MCNC: 87 MG/DL — SIGNIFICANT CHANGE UP (ref 70–99)
GLUCOSE BLDC GLUCOMTR-MCNC: 95 MG/DL — SIGNIFICANT CHANGE UP (ref 70–99)
GLUCOSE SERPL-MCNC: 87 MG/DL — SIGNIFICANT CHANGE UP (ref 70–99)
HCT VFR BLD CALC: 40.9 % — SIGNIFICANT CHANGE UP (ref 34.5–45)
HGB BLD-MCNC: 13.4 G/DL — SIGNIFICANT CHANGE UP (ref 11.5–15.5)
HIV-1 VIRAL LOAD RESULT: (no result)
HIV1 RNA # SERPL NAA+PROBE: SIGNIFICANT CHANGE UP
HIV1 RNA SER-IMP: SIGNIFICANT CHANGE UP
HIV1 RNA SERPL NAA+PROBE-ACNC: (no result)
HIV1 RNA SERPL NAA+PROBE-LOG#: (no result) LG COP/ML
MAGNESIUM SERPL-MCNC: 1.9 MG/DL — SIGNIFICANT CHANGE UP (ref 1.6–2.6)
MCHC RBC-ENTMCNC: 32.8 G/DL — SIGNIFICANT CHANGE UP (ref 32–36)
MCHC RBC-ENTMCNC: 34 PG — SIGNIFICANT CHANGE UP (ref 27–34)
MCV RBC AUTO: 103.8 FL — HIGH (ref 80–100)
PCP SPEC-MCNC: SIGNIFICANT CHANGE UP
PLATELET # BLD AUTO: 182 K/UL — SIGNIFICANT CHANGE UP (ref 150–400)
POTASSIUM SERPL-MCNC: 4 MMOL/L — SIGNIFICANT CHANGE UP (ref 3.5–5.3)
POTASSIUM SERPL-SCNC: 4 MMOL/L — SIGNIFICANT CHANGE UP (ref 3.5–5.3)
RBC # BLD: 3.94 M/UL — SIGNIFICANT CHANGE UP (ref 3.8–5.2)
RBC # FLD: 14.1 % — SIGNIFICANT CHANGE UP (ref 10.3–16.9)
SODIUM SERPL-SCNC: 141 MMOL/L — SIGNIFICANT CHANGE UP (ref 135–145)
WBC # BLD: 3.3 K/UL — LOW (ref 3.8–10.5)
WBC # FLD AUTO: 3.3 K/UL — LOW (ref 3.8–10.5)

## 2018-03-10 RX ORDER — METOPROLOL TARTRATE 50 MG
25 TABLET ORAL
Qty: 0 | Refills: 0 | Status: DISCONTINUED | OUTPATIENT
Start: 2018-03-10 | End: 2018-03-14

## 2018-03-10 RX ORDER — ISOSORBIDE MONONITRATE 60 MG/1
30 TABLET, EXTENDED RELEASE ORAL DAILY
Qty: 0 | Refills: 0 | Status: DISCONTINUED | OUTPATIENT
Start: 2018-03-10 | End: 2018-03-14

## 2018-03-10 RX ADMIN — Medication 1250 MILLIGRAM(S): at 08:07

## 2018-03-10 RX ADMIN — HEPARIN SODIUM 5000 UNIT(S): 5000 INJECTION INTRAVENOUS; SUBCUTANEOUS at 21:30

## 2018-03-10 RX ADMIN — SACUBITRIL AND VALSARTAN 1 TABLET(S): 24; 26 TABLET, FILM COATED ORAL at 17:53

## 2018-03-10 RX ADMIN — ISOSORBIDE MONONITRATE 30 MILLIGRAM(S): 60 TABLET, EXTENDED RELEASE ORAL at 17:53

## 2018-03-10 RX ADMIN — Medication 1250 MILLIGRAM(S): at 17:58

## 2018-03-10 RX ADMIN — Medication 40 MILLIGRAM(S): at 17:53

## 2018-03-10 RX ADMIN — Medication 81 MILLIGRAM(S): at 13:05

## 2018-03-10 RX ADMIN — Medication 10 MILLIGRAM(S): at 13:05

## 2018-03-10 RX ADMIN — Medication 10 MILLIGRAM(S): at 21:30

## 2018-03-10 RX ADMIN — HEPARIN SODIUM 5000 UNIT(S): 5000 INJECTION INTRAVENOUS; SUBCUTANEOUS at 13:05

## 2018-03-10 RX ADMIN — Medication 10 MILLIGRAM(S): at 06:42

## 2018-03-10 RX ADMIN — SACUBITRIL AND VALSARTAN 1 TABLET(S): 24; 26 TABLET, FILM COATED ORAL at 06:43

## 2018-03-10 RX ADMIN — Medication 40 MILLIGRAM(S): at 06:43

## 2018-03-10 RX ADMIN — HEPARIN SODIUM 5000 UNIT(S): 5000 INJECTION INTRAVENOUS; SUBCUTANEOUS at 06:43

## 2018-03-10 RX ADMIN — Medication 25 MILLIGRAM(S): at 17:54

## 2018-03-10 NOTE — PROGRESS NOTE ADULT - PROBLEM SELECTOR PLAN 3
SBP 130s-150s  - continue home entresto and lasix 40mg IV BID SBP 130s-140s  - continue home entresto, lasix 40mg IV BID, metoprolol 25mg PO and Imdur 30mg

## 2018-03-10 NOTE — PROGRESS NOTE ADULT - PROBLEM SELECTOR PLAN 4
HIV Diagnosed in 1997. Can't recall her doctor  - Currently taking Combivir and Viracept at home   - F/U viral load and CD4 count  OF NOTE: patient reports her family does NOT know she has HIV and requests this is NOT brought up in front of her family. -HIV Diagnosed in 1997. Can't recall her doctor, OF NOTE: patient reports her family does NOT know she has HIV and requests this is NOT brought up in front of her family.   - Currently taking Combivir and Viracept at home   - Viral load <30, F/u CD4

## 2018-03-10 NOTE — PROGRESS NOTE ADULT - PROBLEM SELECTOR PLAN 1
-Pt mildly SOB,   BNP 7,293 on admission, cardiologist Julia Del Toro -   - NST 01/2018 which revealed EF 28% and a small perfusion abnormality of moderate intensity in the apical region  - Echo 3/8 revealed LV moderately dilated, severe global hypokinesis of LV with EF 20%, LA moderately dilated, mod-severe MR, mod-severe TR, mild pulmHTN with PAP 47mmHg, small pericardial effusion noted  - Continue lasix 40mg IV BID. Will plan on repeat limited Echo on Saturday to assess severity of MR with tentative plan for JACY and R+L heart catheterization on Monday if medically optimized.   - repeat CXR today consistent with pulmonary vascular congestion  - CTA PE Protocol consistent with CHF  - continue home entresto, started hydralazine 10mg TID today with plan to add imdur tomorrow and BB if utox neg for cocaine  - Patient is a POOR HISTORIAN; unsure of all her doctors; looked at scripts in [place] and called prescribers ([facility]). Patient's cardiologist is Dr. Julia Del Toro. Pt mildly SOB,   BNP 7,293 on admission, cardiologist Julia Del Toro -   - NST 01/2018 which revealed EF 28% and a small perfusion abnormality of moderate intensity in the apical region  - Echo 3/8 revealed LV moderately dilated, severe global hypokinesis of LV with EF 20%, LA moderately dilated, mod-severe MR, mod-severe TR, mild pulmHTN with PAP 47mmHg, small pericardial effusion noted  - repeat CXR 03/09 consistent with pulmonary vascular congestion  - CTA PE Protocol consistent with CHF  - Continue lasix 40mg IV BID, continue home entresto, and continue hydralazine 10mg TID, started metoprolol 25 mg BID, Imdur 30mg PO daily  - Tentative plan for JACY and R+L heart catheterization on Monday if medically optimized  -F/u echo

## 2018-03-10 NOTE — PROGRESS NOTE ADULT - SUBJECTIVE AND OBJECTIVE BOX
Interventional Cardiology PA Adult Progress Note    Chief Complaint: Shortness of breath  Subjective Assessment: Pt was examined at bedside and reported mild SOB, denies CP. 12 point ROS negative except per subjective  	  MEDICATIONS:  furosemide   Injectable 40 milliGRAM(s) IV Push every 12 hours  hydrALAZINE 10 milliGRAM(s) Oral three times a day  sacubitril 24 mG/valsartan 26 mG 1 Tablet(s) Oral two times a day  nelfinavir 1250 milliGRAM(s) Oral two times a day  zidovudine 300 mG/lamiVUDine 150 mG 1 Tablet(s) Oral two times a day  dextrose 50% Injectable 12.5 Gram(s) IV Push once  dextrose 50% Injectable 25 Gram(s) IV Push once  dextrose 50% Injectable 25 Gram(s) IV Push once  dextrose Gel 1 Dose(s) Oral once PRN  glucagon  Injectable 1 milliGRAM(s) IntraMuscular once PRN  insulin lispro (HumaLOG) corrective regimen sliding scale   SubCutaneous Before meals and at bedtime  aspirin enteric coated 81 milliGRAM(s) Oral daily  dextrose 5%. 1000 milliLiter(s) IV Continuous <Continuous>  heparin  Injectable 5000 Unit(s) SubCutaneous every 8 hours      	    [PHYSICAL EXAM:  TELEMETRY:  T(C): 36.3 (03-10-18 @ 13:35), Max: 36.3 (03-10-18 @ 13:35)  HR: 96 (03-10-18 @ 13:03) (89 - 100)  BP: 130/80 (03-10-18 @ 13:03) (130/80 - 147/98)  RR: 17 (03-10-18 @ 13:03) (16 - 17)  SpO2: 100% (03-10-18 @ 13:03) (97% - 100%)  Wt(kg): 69.90  I&O's Summary    09 Mar 2018 07:01  -  10 Mar 2018 07:00  --------------------------------------------------------  IN: 1080 mL / OUT: 1770 mL / NET: -690 mL    10 Mar 2018 06:01  -  10 Mar 2018 15:37  --------------------------------------------------------  IN: 480 mL / OUT: 840 mL / NET: -360 mL        Appearance: Normal	  HEENT:   Normal oral mucosa, PERRL, EOMI	  Neck: Supple, + JVD; No Carotid Bruit   Cardiovascular: Normal S1 S2, +JVD, No murmurs  Respiratory: Mild crackles at the bases b/l  Gastrointestinal:  Soft, Non-tender, + BS	  Skin: No rashes, No ecchymoses, No cyanosis  Extremities: Normal range of motion, No clubbing, cyanosis or edema  Vascular: Peripheral pulses palpable 2+ bilaterally  Neurologic: Non-focal  Psychiatry: A & O x 3, Mood & affect appropriate      RADIOLOGY:   DIAGNOSTIC TESTING:  Echocardiogram 03/08/2018: The left ventricle is moderately dilated. Preserved left ventricular thickness. There is severe global hypokinesis of the left ventricle. The left  ventricular ejection fraction is 20%.Probably normal right ventricular size and function The left atrium is moderately dilated. The left atrial volume index is 47 cc/m2 (normal <34cc/m2)The right atrium is dilated. Structurally normal aortic valve. No aortic regurgitation noted. Tethered mitral valve leaflets with normal opening and mildly thickened tips. There is moderate to severe mitral regurgitation. Structurally normal tricuspid valve. There is moderate to severe tricuspid regurgitation. There is mild pulmonary hypertension. The pulmonary artery systolic pressure is estimated to be 47 mmHg. Structurally normal pulmonic valve. There is trace pulmonic regurgitation. A small pericardial effusion noted. No prior echo available for comparison.    CTA chest 03/08/2018: 1.  No pulmonary embolism. 2.  Mild to moderate bilateral pleural effusions, small pericardial effusion, cardiomegaly, and increased right heart pressure, which may indicate a component of CHF. Correlate with echocardiogram.     CXR 03/08/2018: Enlarged cardiac silhouette with small bilateral pleural effusions. Stigmata of right mastectomy with soft tissue expander.    	    LABS:	 	  CARDIAC MARKERS:  ( 08 Mar 2018 19:18 )  x     / 0.03 ng/mL / x     / x     / x                          13.4   3.3   )-----------( 182      ( 10 Mar 2018 07:34 )             40.9     03-10    141  |  104  |  12  ----------------------------<  87  4.0   |  24  |  0.72    Ca    9.3      10 Mar 2018 07:34  Mg     1.9     03-10      proBNP: 7293   Lipid Profile: (03.09.18 @ 05:45)   Total Cholesterol/HDL Ratio Measurement: 3.1 RATIO   Cholesterol, Serum: 150 mg/dL   Triglycerides, Serum: 76 mg/dL   HDL Cholesterol, Serum: 49 mg/dL   Direct LDL: 86: LDL

## 2018-03-10 NOTE — PROGRESS NOTE ADULT - PROBLEM SELECTOR PLAN 5
Followed by Dr. Costa  - Patient reports she is suppose see the radiation specialist tomorrow to discuss further treatment. Left message at office to inform them why she is not keeping appointment, will try to see who cardiac cleared her for surgery and if she has been following with them. -Followed by Dr. Costa  - Patient reports she is suppose see the radiation specialist today to discuss further treatment. Message left at office to inform them why she is not keeping appointment

## 2018-03-10 NOTE — PROGRESS NOTE ADULT - PROBLEM SELECTOR PLAN 2
Moderate to severe MR and TR on echo  - Will need aggressive diuresis: Lasix 40 mg IV BID. With plan for repeat limited Echo on Saturday to re assess MR. If MR persistent; will consider JACY and Right and Left Heart Catheterization on Monday. Moderate to severe MR and TR on echo  -  Lasix 40 mg IV BID.   -F/u echo, will consider JACY and Right and Left Heart Catheterization on Monday.

## 2018-03-10 NOTE — PROGRESS NOTE ADULT - PROBLEM SELECTOR PLAN 6
- Patient has been Risperdol 2 mg BID in past. Patient reports she has an alter Ego and experiences hallucinations intermittently. Patient currently denies SI and HI. Cooperative during interview.     Dispo: Pending clinical progression. - Patient has been Risperdol 2 mg BID in past. Patient reports she has an alter Ego and experiences hallucinations intermittently. Patient currently denies SI and HI. Pt is cooperative.     Dispo: Pending clinical progression.

## 2018-03-11 DIAGNOSIS — R94.31 ABNORMAL ELECTROCARDIOGRAM [ECG] [EKG]: ICD-10-CM

## 2018-03-11 DIAGNOSIS — Z01.89 ENCOUNTER FOR OTHER SPECIFIED SPECIAL EXAMINATIONS: ICD-10-CM

## 2018-03-11 LAB
ANION GAP SERPL CALC-SCNC: 13 MMOL/L — SIGNIFICANT CHANGE UP (ref 5–17)
BUN SERPL-MCNC: 14 MG/DL — SIGNIFICANT CHANGE UP (ref 7–23)
CALCIUM SERPL-MCNC: 9.1 MG/DL — SIGNIFICANT CHANGE UP (ref 8.4–10.5)
CHLORIDE SERPL-SCNC: 104 MMOL/L — SIGNIFICANT CHANGE UP (ref 96–108)
CO2 SERPL-SCNC: 25 MMOL/L — SIGNIFICANT CHANGE UP (ref 22–31)
CREAT SERPL-MCNC: 0.82 MG/DL — SIGNIFICANT CHANGE UP (ref 0.5–1.3)
GLUCOSE BLDC GLUCOMTR-MCNC: 109 MG/DL — HIGH (ref 70–99)
GLUCOSE BLDC GLUCOMTR-MCNC: 115 MG/DL — HIGH (ref 70–99)
GLUCOSE BLDC GLUCOMTR-MCNC: 156 MG/DL — HIGH (ref 70–99)
GLUCOSE BLDC GLUCOMTR-MCNC: 96 MG/DL — SIGNIFICANT CHANGE UP (ref 70–99)
GLUCOSE SERPL-MCNC: 117 MG/DL — HIGH (ref 70–99)
HCT VFR BLD CALC: 40.8 % — SIGNIFICANT CHANGE UP (ref 34.5–45)
HGB BLD-MCNC: 13.4 G/DL — SIGNIFICANT CHANGE UP (ref 11.5–15.5)
MAGNESIUM SERPL-MCNC: 1.8 MG/DL — SIGNIFICANT CHANGE UP (ref 1.6–2.6)
MCHC RBC-ENTMCNC: 32.8 G/DL — SIGNIFICANT CHANGE UP (ref 32–36)
MCHC RBC-ENTMCNC: 34.4 PG — HIGH (ref 27–34)
MCV RBC AUTO: 104.6 FL — HIGH (ref 80–100)
PLATELET # BLD AUTO: 190 K/UL — SIGNIFICANT CHANGE UP (ref 150–400)
POTASSIUM SERPL-MCNC: 3.8 MMOL/L — SIGNIFICANT CHANGE UP (ref 3.5–5.3)
POTASSIUM SERPL-SCNC: 3.8 MMOL/L — SIGNIFICANT CHANGE UP (ref 3.5–5.3)
RBC # BLD: 3.9 M/UL — SIGNIFICANT CHANGE UP (ref 3.8–5.2)
RBC # FLD: 14.5 % — SIGNIFICANT CHANGE UP (ref 10.3–16.9)
SODIUM SERPL-SCNC: 142 MMOL/L — SIGNIFICANT CHANGE UP (ref 135–145)
WBC # BLD: 3.7 K/UL — LOW (ref 3.8–10.5)
WBC # FLD AUTO: 3.7 K/UL — LOW (ref 3.8–10.5)

## 2018-03-11 PROCEDURE — 71045 X-RAY EXAM CHEST 1 VIEW: CPT | Mod: 26

## 2018-03-11 PROCEDURE — 93010 ELECTROCARDIOGRAM REPORT: CPT

## 2018-03-11 RX ORDER — MAGNESIUM OXIDE 400 MG ORAL TABLET 241.3 MG
400 TABLET ORAL ONCE
Qty: 0 | Refills: 0 | Status: COMPLETED | OUTPATIENT
Start: 2018-03-11 | End: 2018-03-11

## 2018-03-11 RX ORDER — ONDANSETRON 8 MG/1
4 TABLET, FILM COATED ORAL ONCE
Qty: 0 | Refills: 0 | Status: DISCONTINUED | OUTPATIENT
Start: 2018-03-11 | End: 2018-03-11

## 2018-03-11 RX ORDER — POTASSIUM CHLORIDE 20 MEQ
40 PACKET (EA) ORAL ONCE
Qty: 0 | Refills: 0 | Status: COMPLETED | OUTPATIENT
Start: 2018-03-11 | End: 2018-03-11

## 2018-03-11 RX ORDER — ACETAMINOPHEN 500 MG
650 TABLET ORAL ONCE
Qty: 0 | Refills: 0 | Status: COMPLETED | OUTPATIENT
Start: 2018-03-11 | End: 2018-03-11

## 2018-03-11 RX ADMIN — Medication 650 MILLIGRAM(S): at 12:28

## 2018-03-11 RX ADMIN — Medication 25 MILLIGRAM(S): at 05:31

## 2018-03-11 RX ADMIN — Medication 1250 MILLIGRAM(S): at 16:44

## 2018-03-11 RX ADMIN — Medication 40 MILLIEQUIVALENT(S): at 14:56

## 2018-03-11 RX ADMIN — MAGNESIUM OXIDE 400 MG ORAL TABLET 400 MILLIGRAM(S): 241.3 TABLET ORAL at 14:56

## 2018-03-11 RX ADMIN — HEPARIN SODIUM 5000 UNIT(S): 5000 INJECTION INTRAVENOUS; SUBCUTANEOUS at 14:56

## 2018-03-11 RX ADMIN — SACUBITRIL AND VALSARTAN 1 TABLET(S): 24; 26 TABLET, FILM COATED ORAL at 07:35

## 2018-03-11 RX ADMIN — SACUBITRIL AND VALSARTAN 1 TABLET(S): 24; 26 TABLET, FILM COATED ORAL at 16:44

## 2018-03-11 RX ADMIN — Medication 25 MILLIGRAM(S): at 16:43

## 2018-03-11 RX ADMIN — Medication 10 MILLIGRAM(S): at 14:56

## 2018-03-11 RX ADMIN — HEPARIN SODIUM 5000 UNIT(S): 5000 INJECTION INTRAVENOUS; SUBCUTANEOUS at 05:31

## 2018-03-11 RX ADMIN — Medication 10 MILLIGRAM(S): at 05:31

## 2018-03-11 RX ADMIN — Medication 81 MILLIGRAM(S): at 11:28

## 2018-03-11 RX ADMIN — Medication 40 MILLIGRAM(S): at 05:31

## 2018-03-11 RX ADMIN — ISOSORBIDE MONONITRATE 30 MILLIGRAM(S): 60 TABLET, EXTENDED RELEASE ORAL at 11:28

## 2018-03-11 RX ADMIN — Medication 1: at 16:42

## 2018-03-11 RX ADMIN — HEPARIN SODIUM 5000 UNIT(S): 5000 INJECTION INTRAVENOUS; SUBCUTANEOUS at 21:13

## 2018-03-11 RX ADMIN — Medication 650 MILLIGRAM(S): at 11:28

## 2018-03-11 RX ADMIN — Medication 1250 MILLIGRAM(S): at 07:35

## 2018-03-11 RX ADMIN — Medication 40 MILLIGRAM(S): at 16:44

## 2018-03-11 NOTE — PROGRESS NOTE ADULT - SUBJECTIVE AND OBJECTIVE BOX
Interventional Cardiology PA Adult Progress Note    Subjective Assessment: Pt seen and examined at bedside this morning. Pt reports intermittently she continues to feel short of breath independent of activity. She denies chest pain, palpitations, dizziness, fever, chills, abdominal pain currently.     	  MEDICATIONS:  furosemide   Injectable 40 milliGRAM(s) IV Push every 12 hours  hydrALAZINE 10 milliGRAM(s) Oral three times a day  isosorbide   mononitrate ER Tablet (IMDUR) 30 milliGRAM(s) Oral daily  metoprolol     tartrate 25 milliGRAM(s) Oral two times a day  sacubitril 24 mG/valsartan 26 mG 1 Tablet(s) Oral two times a day  nelfinavir 1250 milliGRAM(s) Oral two times a day  zidovudine 300 mG/lamiVUDine 150 mG 1 Tablet(s) Oral two times a day  dextrose 50% Injectable 12.5 Gram(s) IV Push once  dextrose 50% Injectable 25 Gram(s) IV Push once  dextrose 50% Injectable 25 Gram(s) IV Push once  dextrose Gel 1 Dose(s) Oral once PRN  glucagon  Injectable 1 milliGRAM(s) IntraMuscular once PRN  insulin lispro (HumaLOG) corrective regimen sliding scale   SubCutaneous Before meals and at bedtime  aspirin enteric coated 81 milliGRAM(s) Oral daily  dextrose 5%. 1000 milliLiter(s) IV Continuous <Continuous>  heparin  Injectable 5000 Unit(s) SubCutaneous every 8 hours  magnesium oxide 400 milliGRAM(s) Oral once  potassium chloride    Tablet ER 40 milliEquivalent(s) Oral onc    [PHYSICAL EXAM:  TELEMETRY: No acute events overnight.   T(C): 36.1 (03-11-18 @ 13:47), Max: 36.6 (03-11-18 @ 05:30)  HR: 79 (03-11-18 @ 11:36) (62 - 82)  BP: 112/76 (03-11-18 @ 11:36) (112/76 - 132/77)  RR: 18 (03-11-18 @ 11:36) (17 - 18)  SpO2: 100% (03-11-18 @ 11:36) (96% - 100%)  Wt(kg): --  I&O's Summary    10 Mar 2018 06:01  -  11 Mar 2018 07:00  --------------------------------------------------------  IN: 840 mL / OUT: 1810 mL / NET: -970 mL    11 Mar 2018 07:01  -  11 Mar 2018 14:37  --------------------------------------------------------  IN: 540 mL / OUT: 0 mL / NET: 540 mL      Height (cm): 162.56 (03-11 @ 06:14)  Weight (kg): 70.8 (03-11 @ 06:14)  BMI (kg/m2): 26.8 (03-11 @ 06:14)  BSA (m2): 1.76 (03-11 @ 06:14)    Gen: NAD, resting comfortable in bed (able to lay at 30 degree angle comfortably this morning)  Neck: No JVD b/l  Cardiac: +S1, S2, RRR, + holosystolic murmur   Pulm: faint bibasilar crackles, otherwise CTA b/l  Abdomen: BS present, soft, NT, ND  Extremities: Warm and well perfused.   Neuro: A+OX3. Cooperative          LABS:	 	                                   13.4   3.7   )-----------( 190      ( 11 Mar 2018 12:43 )             40.8     03-11    142  |  104  |  14  ----------------------------<  117<H>  3.8   |  25  |  0.82    Ca    9.1      11 Mar 2018 12:43  Mg     1.8     03-11

## 2018-03-11 NOTE — PROGRESS NOTE ADULT - PROBLEM SELECTOR PLAN 1
-Outpatient cardiologist Julia Del Toro   - BNP 7,293 on admission; F/U AM BNP level   - NST 01/2018: EF 28% and a small perfusion abnormality of moderate intensity in the apical region  - Echo 3/8/17: LV moderately dilated, severe global hypokinesis of LV with EF 20%, LA moderately dilated, mod-severe MR, mod-severe TR, mild pulmHTN with PAP 47mmHg, small pericardial effusion noted  - Repeat CXR 3/09 consistent with pulmonary vascular congestion. CTA PE Protocol on admission: consistent with CHF. F/U REPEAT CXR TODAY.   - Continue Lasix 40mg IV BID. Continue home Entresto. Patient has been started on Hydralazine 10mg TID, Metoprolol 25 mg BID and Imdur 30mg PO daily  - Plan for repeat Transthoracic Echo in AM (was not done over weekend). Will make patient NPO pending TTE patient may need JACY to further assess MR. In addition, will plan on R and Left heart Catheterization in AM (precath/consented)

## 2018-03-11 NOTE — PROGRESS NOTE ADULT - PROBLEM SELECTOR PLAN 8
-Lives with her children  -PT evaluation ordered.     DVT PPx: Heparin SC.       Dispo: Repeat limited TTE to assess MR s/p IV diuresis. R+L Heart Catheterization tomorrow. F/U REPEAT CXR TODAY

## 2018-03-11 NOTE — PROGRESS NOTE ADULT - PROBLEM SELECTOR PLAN 3
-SBP 120s today.   - continue home Entresto BID, Metoprolol 25mg PO BID, and Imdur 30mg daily. -Patient with prolonged QTc on admission 492 ms. Repeat 12 Lead  ms.   -Currently not on any Qt prolonging agents.   -F/U AM 12 Lead EKG

## 2018-03-11 NOTE — PROGRESS NOTE ADULT - PROBLEM SELECTOR PLAN 7
-Lives with her children  -PT evaluation ordered.     DVT PPx: Heparin SC.       Dispo: Repeat limited TTE to assess MR s/p IV diuresis. R+L Heart Catheterization tomorrow. F/U REPEAT CXR TODAY - Patient has been Risperdol 2 mg BID in past. Patient reports she has an alter Ego and experiences hallucinations intermittently. Patient currently denies SI and HI. Pt is cooperative.

## 2018-03-11 NOTE — PROGRESS NOTE ADULT - PROBLEM SELECTOR PLAN 4
-HIV Diagnosed in 1997. Can't recall her doctor, OF NOTE: patient reports her family does NOT know she has HIV and requests this is NOT brought up in front of her family.   - Currently taking Combivir and Viracept at home   - Viral load <30, F/u CD4. -SBP 120s today.   - continue home Entresto BID, Metoprolol 25mg PO BID, and Imdur 30mg daily.

## 2018-03-11 NOTE — PROGRESS NOTE ADULT - PROBLEM SELECTOR PLAN 5
-Followed by Dr. Costa (seen in office 3/8/2018)  -s/p right breast mastectomy with tissue expander placement 1/25/2018. Currently receiving outpatient expansion   -Utox: Positive for THC (patient reports since chemo treatment she uses marijuana to stimulate her appetite) -HIV Diagnosed in 1997. Can't recall her doctor, OF NOTE: patient reports her family does NOT know she has HIV and requests this is NOT brought up in front of her family.   - Currently taking Combivir and Viracept at home   - Viral load <30, F/u CD4.

## 2018-03-11 NOTE — PROGRESS NOTE ADULT - PROBLEM SELECTOR PLAN 6
- Patient has been Risperdol 2 mg BID in past. Patient reports she has an alter Ego and experiences hallucinations intermittently. Patient currently denies SI and HI. Pt is cooperative. -Followed by Dr. Costa (seen in office 3/8/2018)  -s/p right breast mastectomy with tissue expander placement 1/25/2018. Currently receiving outpatient expansion   -Utox: Positive for THC (patient reports since chemo treatment she uses marijuana to stimulate her appetite)

## 2018-03-11 NOTE — PROGRESS NOTE ADULT - PROBLEM SELECTOR PLAN 2
-Moderate to severe MR and TR on Echo this admission. Currently receiving Lasix 40 mg IV BID.   -Repeat Limited Echo ordered to assess MR s/p aggressive IV diuresis. Pending results to consider JACY tomorrow.   -Plan for Right and Left heart catheterization tomorrow.

## 2018-03-12 DIAGNOSIS — I34.0 NONRHEUMATIC MITRAL (VALVE) INSUFFICIENCY: ICD-10-CM

## 2018-03-12 LAB
4/8 RATIO: 1.11 RATIO — SIGNIFICANT CHANGE UP (ref 0.9–3.6)
ABS CD8: 725 /UL — SIGNIFICANT CHANGE UP (ref 136–757)
ANION GAP SERPL CALC-SCNC: 13 MMOL/L — SIGNIFICANT CHANGE UP (ref 5–17)
APTT BLD: 43.5 SEC — HIGH (ref 27.5–37.4)
BUN SERPL-MCNC: 13 MG/DL — SIGNIFICANT CHANGE UP (ref 7–23)
CALCIUM SERPL-MCNC: 9.7 MG/DL — SIGNIFICANT CHANGE UP (ref 8.4–10.5)
CD3 BLASTS SPEC-ACNC: 1575 /UL — SIGNIFICANT CHANGE UP (ref 799–2171)
CD3 BLASTS SPEC-ACNC: 85 % — SIGNIFICANT CHANGE UP (ref 59–85)
CD4 %: 43 % — SIGNIFICANT CHANGE UP (ref 36–65)
CD8 %: 39 % — HIGH (ref 11–36)
CHLORIDE SERPL-SCNC: 104 MMOL/L — SIGNIFICANT CHANGE UP (ref 96–108)
CO2 SERPL-SCNC: 26 MMOL/L — SIGNIFICANT CHANGE UP (ref 22–31)
CREAT SERPL-MCNC: 0.83 MG/DL — SIGNIFICANT CHANGE UP (ref 0.5–1.3)
GLUCOSE BLDC GLUCOMTR-MCNC: 112 MG/DL — HIGH (ref 70–99)
GLUCOSE BLDC GLUCOMTR-MCNC: 120 MG/DL — HIGH (ref 70–99)
GLUCOSE BLDC GLUCOMTR-MCNC: 78 MG/DL — SIGNIFICANT CHANGE UP (ref 70–99)
GLUCOSE BLDC GLUCOMTR-MCNC: 90 MG/DL — SIGNIFICANT CHANGE UP (ref 70–99)
GLUCOSE SERPL-MCNC: 91 MG/DL — SIGNIFICANT CHANGE UP (ref 70–99)
HCT VFR BLD CALC: 43.1 % — SIGNIFICANT CHANGE UP (ref 34.5–45)
HGB BLD-MCNC: 13.8 G/DL — SIGNIFICANT CHANGE UP (ref 11.5–15.5)
INR BLD: 1.18 — HIGH (ref 0.88–1.16)
MAGNESIUM SERPL-MCNC: 1.8 MG/DL — SIGNIFICANT CHANGE UP (ref 1.6–2.6)
MCHC RBC-ENTMCNC: 32 G/DL — SIGNIFICANT CHANGE UP (ref 32–36)
MCHC RBC-ENTMCNC: 32.6 PG — SIGNIFICANT CHANGE UP (ref 27–34)
MCV RBC AUTO: 101.9 FL — HIGH (ref 80–100)
NT-PROBNP SERPL-SCNC: 2312 PG/ML — HIGH (ref 0–300)
PLATELET # BLD AUTO: 165 K/UL — SIGNIFICANT CHANGE UP (ref 150–400)
POTASSIUM SERPL-MCNC: 4.3 MMOL/L — SIGNIFICANT CHANGE UP (ref 3.5–5.3)
POTASSIUM SERPL-SCNC: 4.3 MMOL/L — SIGNIFICANT CHANGE UP (ref 3.5–5.3)
PROTHROM AB SERPL-ACNC: 13.1 SEC — HIGH (ref 9.8–12.7)
RBC # BLD: 4.23 M/UL — SIGNIFICANT CHANGE UP (ref 3.8–5.2)
RBC # FLD: 13.6 % — SIGNIFICANT CHANGE UP (ref 10.3–16.9)
SODIUM SERPL-SCNC: 143 MMOL/L — SIGNIFICANT CHANGE UP (ref 135–145)
T-CELL CD4 SUBSET PNL BLD: 802 /UL — SIGNIFICANT CHANGE UP (ref 489–1457)
WBC # BLD: 2.9 K/UL — LOW (ref 3.8–10.5)
WBC # FLD AUTO: 2.9 K/UL — LOW (ref 3.8–10.5)

## 2018-03-12 PROCEDURE — 93306 TTE W/DOPPLER COMPLETE: CPT | Mod: 26

## 2018-03-12 PROCEDURE — 99233 SBSQ HOSP IP/OBS HIGH 50: CPT

## 2018-03-12 PROCEDURE — 93460 R&L HRT ART/VENTRICLE ANGIO: CPT | Mod: 26

## 2018-03-12 PROCEDURE — 93010 ELECTROCARDIOGRAM REPORT: CPT

## 2018-03-12 RX ORDER — FUROSEMIDE 40 MG
20 TABLET ORAL EVERY 12 HOURS
Qty: 0 | Refills: 0 | Status: DISCONTINUED | OUTPATIENT
Start: 2018-03-12 | End: 2018-03-14

## 2018-03-12 RX ORDER — MAGNESIUM OXIDE 400 MG ORAL TABLET 241.3 MG
400 TABLET ORAL ONCE
Qty: 0 | Refills: 0 | Status: COMPLETED | OUTPATIENT
Start: 2018-03-12 | End: 2018-03-12

## 2018-03-12 RX ORDER — HEPARIN SODIUM 5000 [USP'U]/ML
5000 INJECTION INTRAVENOUS; SUBCUTANEOUS EVERY 8 HOURS
Qty: 0 | Refills: 0 | Status: DISCONTINUED | OUTPATIENT
Start: 2018-03-12 | End: 2018-03-12

## 2018-03-12 RX ORDER — MAGNESIUM OXIDE 400 MG ORAL TABLET 241.3 MG
400 TABLET ORAL DAILY
Qty: 0 | Refills: 0 | Status: DISCONTINUED | OUTPATIENT
Start: 2018-03-12 | End: 2018-03-12

## 2018-03-12 RX ADMIN — Medication 1250 MILLIGRAM(S): at 17:28

## 2018-03-12 RX ADMIN — SACUBITRIL AND VALSARTAN 1 TABLET(S): 24; 26 TABLET, FILM COATED ORAL at 05:48

## 2018-03-12 RX ADMIN — Medication 25 MILLIGRAM(S): at 05:48

## 2018-03-12 RX ADMIN — Medication 10 MILLIGRAM(S): at 05:48

## 2018-03-12 RX ADMIN — MAGNESIUM OXIDE 400 MG ORAL TABLET 400 MILLIGRAM(S): 241.3 TABLET ORAL at 17:28

## 2018-03-12 RX ADMIN — Medication 81 MILLIGRAM(S): at 05:48

## 2018-03-12 RX ADMIN — Medication 20 MILLIGRAM(S): at 17:28

## 2018-03-12 RX ADMIN — ISOSORBIDE MONONITRATE 30 MILLIGRAM(S): 60 TABLET, EXTENDED RELEASE ORAL at 17:28

## 2018-03-12 RX ADMIN — Medication 40 MILLIGRAM(S): at 05:47

## 2018-03-12 RX ADMIN — Medication 10 MILLIGRAM(S): at 22:06

## 2018-03-12 RX ADMIN — SACUBITRIL AND VALSARTAN 1 TABLET(S): 24; 26 TABLET, FILM COATED ORAL at 17:28

## 2018-03-12 RX ADMIN — Medication 25 MILLIGRAM(S): at 17:28

## 2018-03-12 NOTE — PROGRESS NOTE ADULT - PROBLEM SELECTOR PLAN 5
-HIV Diagnosed in 1997. Can't recall her doctor, OF NOTE: patient reports her family does NOT know she has HIV and requests this is NOT brought up in front of her family.   - Continue Combivir and Viracept (home medications)   - Viral load <30, ABS CD8: 800

## 2018-03-12 NOTE — PROGRESS NOTE ADULT - PROBLEM SELECTOR PLAN 3
-Patient with prolonged QTc on admission 492 ms. Repeat 12 Lead EKG (3/11/2018): 527 ms. F/U EKG today.   -Currently not on any QT prolonging agents.   -Discuss further with Dr. Dominguez

## 2018-03-12 NOTE — PROGRESS NOTE ADULT - PROBLEM SELECTOR PLAN 2
-History of MR.   -On admission Echo (3/8/2018): Moderate to severe MR. Repeat Echo (3/12/2018) s/p IV diuresis revealed unchanged moderate to severe MR.   -F/U R+L heart catheterization findings  -Discuss CTS consult with Dr. Dominguez -History of MR.  CTS: Dr. John consulted: F/U recs  -On admission Echo (3/8/2018): Moderate to severe MR. Repeat Echo (3/12/2018) s/p IV diuresis revealed unchanged moderate to severe MR.   -s/p Right and Left heart cath (3/12/2018): 3+ MR  -NPO after midnight for JACY in AM to further assess severity of MR.

## 2018-03-12 NOTE — PROGRESS NOTE ADULT - PROBLEM SELECTOR PLAN 8
-Lives with her children  -PT evaluation (3/12/2018): no needs     DVT PPx: Heparin SC.     Dispo: F/U Right and Left Heart catheterization. Discuss CTS consult with Dr. Dominguez.    Case discussed with Dr. Dominguez and Dr. Farnsworth this AM. -Lives with her children  -PT evaluation (3/12/2018): no needs     DVT PPx: Heparin SC.     Dispo: NPO after midnight for JACY. F/U CTS recs.     Case discussed with Dr. Dominguez and Dr. Farnsworth this AM.

## 2018-03-12 NOTE — PHYSICAL THERAPY INITIAL EVALUATION ADULT - PERTINENT HX OF CURRENT PROBLEM, REHAB EVAL
56 yo female presents with SOB, DUNN found to have acute on chronic sCHF exacerbation seen for PT [facility] hospital day #5

## 2018-03-12 NOTE — CONSULT NOTE ADULT - PROBLEM SELECTOR RECOMMENDATION 9
Primary team managing hemodynamics and heart failure.  We are being consulted to comment on the MR from a surgical standpoint.  Dr. Hogue is covering for Dr. John today, will discuss case with him. Dr. John will be back tomorrow, will relay information to him as well.  Agree with JACY (planned for tomorrow).  Medical management for now.

## 2018-03-12 NOTE — PHYSICAL THERAPY INITIAL EVALUATION ADULT - GAIT DEVIATIONS NOTED, PT EVAL
decreased step length/decreased stride length/decreased polo/RPE: prior to ambulation 6, following amb 6-7

## 2018-03-12 NOTE — PROGRESS NOTE ADULT - SUBJECTIVE AND OBJECTIVE BOX
Interventional Cardiology PA Adult Progress Note    CC: SOB  Subjective Assessment: Pt seen and examined at bedside this morning. Pt reports she is still SOB but compared to how she felt at beginning of this admission, breathing has improved. She notes she walked with PT this AM and felt "Okay" but gets SOB. She denies chest tightness/pain, dizziness, fever, chills, abdominal pain, leg edema.     12 points review of systems otherwise negative except per subjective    	  MEDICATIONS:  furosemide    Tablet 20 milliGRAM(s) Oral every 12 hours  hydrALAZINE 10 milliGRAM(s) Oral three times a day  isosorbide   mononitrate ER Tablet (IMDUR) 30 milliGRAM(s) Oral daily  metoprolol     tartrate 25 milliGRAM(s) Oral two times a day  sacubitril 24 mG/valsartan 26 mG 1 Tablet(s) Oral two times a day  nelfinavir 1250 milliGRAM(s) Oral two times a day  zidovudine 300 mG/lamiVUDine 150 mG 1 Tablet(s) Oral two times a day  dextrose 50% Injectable 12.5 Gram(s) IV Push once  dextrose 50% Injectable 25 Gram(s) IV Push once  dextrose 50% Injectable 25 Gram(s) IV Push once  dextrose Gel 1 Dose(s) Oral once PRN  glucagon  Injectable 1 milliGRAM(s) IntraMuscular once PRN  insulin lispro (HumaLOG) corrective regimen sliding scale   SubCutaneous Before meals and at bedtime  aspirin enteric coated 81 milliGRAM(s) Oral daily  dextrose 5%. 1000 milliLiter(s) IV Continuous <Continuous>  heparin  Injectable 5000 Unit(s) SubCutaneous every 8 hours  magnesium oxide 400 milliGRAM(s) Oral once    [PHYSICAL EXAM:  TELEMETRY:  T(C): 36.2 (03-12-18 @ 09:50), Max: 36.8 (03-12-18 @ 04:30)  HR: 82 (03-12-18 @ 08:05) (71 - 93)  BP: 130/80 (03-12-18 @ 08:05) (99/66 - 133/83)  RR: 18 (03-12-18 @ 08:05) (16 - 18)  SpO2: 97% (03-12-18 @ 08:05) (97% - 100%)  Wt(kg): --  I&O's Summary    11 Mar 2018 07:01  -  12 Mar 2018 07:00  --------------------------------------------------------  IN: 720 mL / OUT: 0 mL / NET: 720 mL    Gen: NAD, resting comfortable in bed  Neck: No JVD b/l  Cardiac: +S1, S2, + holosystolic murmur  Pulm: CTA b/l, diminished breathsounds at bases  Abdomen: BS present, soft, NT, ND  Extremities: No C/C/E b/l, warm to touch  Neuro: A+OX3, pleasant and cooperative. No focal deficiits.      LABS:	 	                                     13.8   2.9   )-----------( 165      ( 12 Mar 2018 06:51 )             43.1     03-12    143  |  104  |  13  ----------------------------<  91  4.3   |  26  |  0.83    Ca    9.7      12 Mar 2018 06:51  Mg     1.8     03-12      proBNP: Serum Pro-Brain Natriuretic Peptide: 2312 pg/mL (03-12 @ 06:51)        PT/INR - ( 12 Mar 2018 06:51 )   PT: 13.1 sec;   INR: 1.18          PTT - ( 12 Mar 2018 06:51 )  PTT:43.5 sec             DVT ppx:  Dispo:

## 2018-03-12 NOTE — PROGRESS NOTE ADULT - PROBLEM SELECTOR PLAN 7
- Patient has been Risperdol 2 mg BID in past. Patient reports she has an alter Ego and experiences hallucinations intermittently. Patient currently denies SI and HI. Pt is cooperative.  -Plan of care discussed with her son present at bedside yesterday.

## 2018-03-12 NOTE — CONSULT NOTE ADULT - SUBJECTIVE AND OBJECTIVE BOX
Surgeon: Franklin Vaughn    Requesting Physician: Dr. Dominguez    HISTORY OF PRESENT ILLNESS:  This is a 56 y/o female, former smoker, former drug abuser (quit ) with a PMHx of HIV diagnosed in  (viral load unknown), schizophrenia? (was prescribed Risperdol in past but does not take), asthma (prior ED visits but denies intubations), HTN, DMII, known mitral valve regurgitation, cardiomyopathy (EF 30% most recently), hx of right breast cancer diagnosed in , s/p chemotherapy (finished 2017) s/p radical mastectomy w/ drain placement x 2 and tissue expander (2018) who presented to [facility] ED on 3/8/18 with the complaint of progressively worsening SOB. Pt reports since she started chemotherapy last year she had experienced progressively worsening SOB. She reports she cannot walk more than 100 feet before become dyspneic and at times suffers from PND and orthopnea. NYHA class III symptoms.  She denies dizziness, palpitations, chest pain, fever, chills, leg edema, headache, recent travel.  She was admitted to cardiology team for CHF exacerbation.  Echo done showing moderate-severe MR; Cardiac cath done showing non-obstructive CAD.  Currently the patient is asymptomatic.      PAST MEDICAL & SURGICAL HISTORY:  Cardiomyopathy  Mitral regurgitation  HLD (hyperlipidemia)  Breast cancer: right, s/p chemo  HIV (human immunodeficiency virus infection)  HTN (hypertension)  Diabetes: type 2  H/O mastectomy, right  Delivered by  section: x3  H/O umbilical hernia repair      MEDICATIONS  (STANDING):  aspirin enteric coated 81 milliGRAM(s) Oral daily  dextrose 5%. 1000 milliLiter(s) (50 mL/Hr) IV Continuous <Continuous>  dextrose 50% Injectable 12.5 Gram(s) IV Push once  dextrose 50% Injectable 25 Gram(s) IV Push once  dextrose 50% Injectable 25 Gram(s) IV Push once  furosemide    Tablet 20 milliGRAM(s) Oral every 12 hours  hydrALAZINE 10 milliGRAM(s) Oral three times a day  insulin lispro (HumaLOG) corrective regimen sliding scale   SubCutaneous Before meals and at bedtime  isosorbide   mononitrate ER Tablet (IMDUR) 30 milliGRAM(s) Oral daily  magnesium oxide 400 milliGRAM(s) Oral once  metoprolol     tartrate 25 milliGRAM(s) Oral two times a day  nelfinavir 1250 milliGRAM(s) Oral two times a day  sacubitril 24 mG/valsartan 26 mG 1 Tablet(s) Oral two times a day  zidovudine 300 mG/lamiVUDine 150 mG 1 Tablet(s) Oral two times a day    MEDICATIONS  (PRN):  dextrose Gel 1 Dose(s) Oral once PRN Blood Glucose LESS THAN 70 milliGRAM(s)/deciliter  glucagon  Injectable 1 milliGRAM(s) IntraMuscular once PRN Glucose LESS THAN 70 milligrams/deciliter      Allergies    No Known Allergies    Intolerances        SOCIAL HISTORY:  Former smoker  Former drug abuser: Cocaine and Marijuana quit 12 years ago  Denies ETOH use past or present  Lives with her son and daughter    FAMILY HISTORY:  Family history of lung cancer (Father)      Review of Systems  CONSTITUTIONAL:  Fevers / chills, sweats, fatigue, weight loss, weight gain                                    NEGATIVE  NEURO:  parathesias, seizures, syncope, confusion                                                                               NEGATIVE  EYES:  Blurry vision, discharge, pain, loss of vision                                                                                  NEGATIVE  ENMT:  Difficulty hearing, vertigo, dysphagia, epistaxis, recent dental work                                     NEGATIVE  CV:  -Chest pain, -palpitations, +DUNN, +orthopnea, +LE edema                                                                                        POSITIVE  RESPIRATORY:  Wheezing, SOB, cough / sputum, hemoptysis                                                              NEGATIVE  GI:  Nausea, vomiting, diarrhea, constipation, melena                                                                        NEGATIVE  : Hematuria, dysuria, urgency, incontinence                                                                                       NEGATIVE  MUSCULOSKELETAL:  arthritis, joint swelling, muscle weakness                                                           NEGATIVE  SKIN/BREAST:  rash, itching, emi loss, masses                                                                                            NEGATIVE  PSYCH:  depression, anxiety, suicidal ideation                                                                                             NEGATIVE  HEME/LYMPH:  bruises easily, enlarged lymph nodes, tender lymph nodes                                        NEGATIVE  ENDOCRINE:  cold intolerance, heat intolerance, polydipsia                                                                   NEGATIVE    PHYSICAL EXAM  Vital Signs Last 24 Hrs  T(C): 36.2 (12 Mar 2018 09:50), Max: 36.8 (12 Mar 2018 04:30)  T(F): 97.2 (12 Mar 2018 09:50), Max: 98.3 (12 Mar 2018 04:30)  HR: 82 (12 Mar 2018 08:05) (71 - 93)  BP: 130/80 (12 Mar 2018 08:05) (99/66 - 133/83)  BP(mean): --  RR: 18 (12 Mar 2018 08:05) (16 - 18)  SpO2: 97% (12 Mar 2018 08:05) (97% - 100%)    CONSTITUTIONAL:                                                                          WNL  NEURO:                                                                                             WNL                      EYES:                                                                                                  WNL  ENMT:                                                                                                WNL  CV:                                                                                                      WNL  RESPIRATORY:                                                                                  WNL  GI:                                                                                                       WNL  : KAT + / -                                                                                 WNL  MUSKULOSKELETAL:                                                                       WNL  SKIN / BREAST:                                                                                 WNL                                                          LABS:                        13.8   2.9   )-----------( 165      ( 12 Mar 2018 06:51 )             43.1     03-12    143  |  104  |  13  ----------------------------<  91  4.3   |  26  |  0.83    Ca    9.7      12 Mar 2018 06:51  Mg     1.8     03-12      PT/INR - ( 12 Mar 2018 06:51 )   PT: 13.1 sec;   INR: 1.18          PTT - ( 12 Mar 2018 06:51 )  PTT:43.5 sec      RADIOLOGY & ADDITIONAL STUDIES:  < from: Xray Chest 1 View- PORTABLE-Urgent (18 @ 13:32) >    Impression: Enlarged cardiac silhouette, consistent with cardiomegaly and   pericardial effusion.    < end of copied text >    < from: Echocardiogram (18 @ 10:33) >  Limited echo to evaluate mitral regurgitation.There is mild mitral valve   thickening. There is moderate to severe mitral regurgitation at a blood   pressure of 120/69 mmHg. Likely mechanism appears functional with   posterior   leaflet restriction.  Small circumferential pericardial effusion with a   larger   collection adjacent to the right atrium.  No chamber collapse seen.When   compared to prior study performed on 3/8/18, there is no significant   change in   mitral regurgitation.    < end of copied text >    Cardiac Cath: normal RH pressures, non obstructive cors, 3+ MR, EF 30-35%, EDP /Wedge 11mmHg Surgeon: Franklin Vaughn    Requesting Physician: Dr. Dominguez    HISTORY OF PRESENT ILLNESS:  This is a 58 y/o female, former smoker (smoked <1PPD for 50 yrs, quit 2 yrs ago), former cocaine abuser (quit ), current marijuana user with a PMHx of HIV diagnosed in  (viral load unknown, and patient not sure how she got it, "maybe while I was in rehab" from sexual encounters.  Denies IV drug use.  Also PMHx of asthma (prior ED visits but denies intubations), HTN, DMII, known mitral valve regurgitation, cardiomyopathy (EF 30% most recently), hx of right breast cancer diagnosed in , s/p chemotherapy (finished 2017) s/p radical right mastectomy, who presented to [facility] ED on 3/8/18 with progressively worsening SOB over the past "few months". Pt reports since she started chemotherapy last year she had experienced progressively worsening SOB. She reports she cannot walk more than 100 feet before become dyspneic and at times suffers from PND and orthopnea.  She also reports SOB at rest, stating that she has to "sit up straight" from lying position.  NYHA class IV symptoms.  She also reports on-going dizziness x "months".  She denies palpitations, chest pain, fever, chills, leg edema, headache, recent travel.  She was admitted to cardiology team for CHF exacerbation.  Echo done showing moderate-severe MR; Cardiac cath done showing non-obstructive CAD.  Currently the patient is asymptomatic.      PAST MEDICAL & SURGICAL HISTORY:  Cardiomyopathy  Mitral regurgitation  HLD (hyperlipidemia)  Breast cancer: right, s/p chemo  HIV (human immunodeficiency virus infection)  HTN (hypertension)  Diabetes: type 2  H/O mastectomy, right  Delivered by  section: x3  H/O umbilical hernia repair      MEDICATIONS  (STANDING):  aspirin enteric coated 81 milliGRAM(s) Oral daily  dextrose 5%. 1000 milliLiter(s) (50 mL/Hr) IV Continuous <Continuous>  dextrose 50% Injectable 12.5 Gram(s) IV Push once  dextrose 50% Injectable 25 Gram(s) IV Push once  dextrose 50% Injectable 25 Gram(s) IV Push once  furosemide    Tablet 20 milliGRAM(s) Oral every 12 hours  hydrALAZINE 10 milliGRAM(s) Oral three times a day  insulin lispro (HumaLOG) corrective regimen sliding scale   SubCutaneous Before meals and at bedtime  isosorbide   mononitrate ER Tablet (IMDUR) 30 milliGRAM(s) Oral daily  magnesium oxide 400 milliGRAM(s) Oral once  metoprolol     tartrate 25 milliGRAM(s) Oral two times a day  nelfinavir 1250 milliGRAM(s) Oral two times a day  sacubitril 24 mG/valsartan 26 mG 1 Tablet(s) Oral two times a day  zidovudine 300 mG/lamiVUDine 150 mG 1 Tablet(s) Oral two times a day    MEDICATIONS  (PRN):  dextrose Gel 1 Dose(s) Oral once PRN Blood Glucose LESS THAN 70 milliGRAM(s)/deciliter  glucagon  Injectable 1 milliGRAM(s) IntraMuscular once PRN Glucose LESS THAN 70 milligrams/deciliter      Allergies    No Known Allergies    Intolerances        SOCIAL HISTORY:  Former smoker- age [age] <1PPD.    Former drug abuser: Cocaine- quit 14 yrs ago.  Current marijuana user (since her cancer diagnosis, because it helped her with her appetite)  Used to drink beer/wine, but luca 2 yrs ago  Lives with her son in an apt.     FAMILY HISTORY:  Family history of lung cancer (Father)      Review of Systems  CONSTITUTIONAL:  Fevers / chills, sweats, fatigue, weight loss, weight gain                                    NEGATIVE  NEURO:  parathesias, seizures, syncope, confusion                                                                               NEGATIVE  EYES:  Blurry vision, discharge, pain, loss of vision                                                                                  NEGATIVE  ENMT:  Difficulty hearing, vertigo, dysphagia, epistaxis, recent dental work                                     NEGATIVE  CV:  -Chest pain, -palpitations, +DUNN, +orthopnea,                                                                                       POSITIVE  RESPIRATORY:  Wheezing, +SOB, cough / sputum, hemoptysis                                                       POSITIVE  GI:  Nausea, vomiting, diarrhea, constipation, melena                                                                        NEGATIVE  : Hematuria, dysuria, urgency, incontinence                                                                                       NEGATIVE  MUSCULOSKELETAL:  arthritis, joint swelling, muscle weakness                                                           NEGATIVE  SKIN/BREAST:  rash, itching, emi loss, masses                                                                                            NEGATIVE  PSYCH:  depression, anxiety, suicidal ideation                                                                                             NEGATIVE  HEME/LYMPH:  bruises easily, enlarged lymph nodes, tender lymph nodes                                        NEGATIVE  ENDOCRINE:  cold intolerance, heat intolerance, polydipsia                                                                   NEGATIVE    PHYSICAL EXAM  Vital Signs Last 24 Hrs  T(C): 36.2 (12 Mar 2018 09:50), Max: 36.8 (12 Mar 2018 04:30)  T(F): 97.2 (12 Mar 2018 09:50), Max: 98.3 (12 Mar 2018 04:30)  HR: 82 (12 Mar 2018 08:05) (71 - 93)  BP: 130/80 (12 Mar 2018 08:05) (99/66 - 133/83)  BP(mean): --  RR: 18 (12 Mar 2018 08:05) (16 - 18)  SpO2: 97% (12 Mar 2018 08:05) (97% - 100%)    CONSTITUTIONAL:                                                                          NAD.  Pleasant, cooperative.    NEURO:                                                                                          No focal deficits.  Mood appropriate.  EYES:                                                                                                  WNL  ENMT:                                                                                                WNL  CV:                                                                                                      S1S2 regular.  No murmur appreciated on exam.  Right implant in place (silicone).    RESPIRATORY:                                                                                  No wheezing anteriorly.  Did not listen posteriorly due to recent cath; Pt is still on strict bed rest.   GI:                                                                                                      Soft, non-tender  : KAT + / -                                                                                 No  MUSKULOSKELETAL:                                                                       WNL  SKIN / BREAST:                                                                                 WNL  Ext: No peripheral edema.  Warm and well perfused.                                                           LABS:                        13.8   2.9   )-----------( 165      ( 12 Mar 2018 06:51 )             43.1     03-12    143  |  104  |  13  ----------------------------<  91  4.3   |  26  |  0.83    Ca    9.7      12 Mar 2018 06:51  Mg     1.8     03-12      PT/INR - ( 12 Mar 2018 06:51 )   PT: 13.1 sec;   INR: 1.18          PTT - ( 12 Mar 2018 06:51 )  PTT:43.5 sec      RADIOLOGY & ADDITIONAL STUDIES:  < from: Xray Chest 1 View- PORTABLE-Urgent (18 @ 13:32) >    Impression: Enlarged cardiac silhouette, consistent with cardiomegaly and   pericardial effusion.    < end of copied text >    < from: Echocardiogram (18 @ 10:33) >  Limited echo to evaluate mitral regurgitation.There is mild mitral valve   thickening. There is moderate to severe mitral regurgitation at a blood   pressure of 120/69 mmHg. Likely mechanism appears functional with   posterior   leaflet restriction.  Small circumferential pericardial effusion with a   larger   collection adjacent to the right atrium.  No chamber collapse seen.When   compared to prior study performed on 3/8/18, there is no significant   change in   mitral regurgitation.    < end of copied text >    Cardiac Cath: normal RH pressures, non obstructive cors, 3+ MR, EF 30-35%, EDP /Wedge 11mmHg

## 2018-03-12 NOTE — PROGRESS NOTE ADULT - PROBLEM SELECTOR PLAN 6
-Followed by Dr. Costa (seen in office 3/8/2018)  -s/p right breast mastectomy with tissue expander placement 1/25/2018. Currently receiving outpatient expansion   -Utox: Positive for THC (patient reports since chemo treatment she uses marijuana to stimulate her appetite).

## 2018-03-12 NOTE — PHYSICAL THERAPY INITIAL EVALUATION ADULT - MODALITIES TREATMENT COMMENTS
SpO2 97% through ambulation, Educated on increasing OOB intervals, Home exercise program including seated hip flexion, long arc quads ankle pumps, bicep curls shoulder press, arm circles for increased aerobic tolerance and circulation while in hospital, discussed discharge planning for outpatient PT and clearance from inpatient PT program.

## 2018-03-12 NOTE — PROGRESS NOTE ADULT - PROBLEM SELECTOR PLAN 1
-Outpatient cardiologist Julia Del Toro   - BNP 7,293 on admission, repeat BNP today: 2,312  - NST 01/2018: EF 28% and a small perfusion abnormality of moderate intensity in the apical region  - Echo 3/8/17: LV moderately dilated, severe global hypokinesis of LV with EF 20%, LA moderately dilated, mod-severe MR, mod-severe TR, mild pulmHTN with PAP 47mmHg, small pericardial effusion   -Repeat Limited Echo today to assess MR s/p IV Diuresis (3/12/2018): moderate to severe MR. Likely mechanism appears functional with posterior leaflet restriction.  Small circumferential pericardial effusion with arger collection adjacent to the right atrium.  No chamber collapse seen. Compared to prior study performed on 3/8/18, no significant change in MR.  -Repeat Frontal CXR (3/11/2018): Enlarged cardiac silhouette, consistent with cardiomegaly and   pericardial effusion.  -s/p Lasix 40 mg IV BID x four days with transition to Lasix 20 mg orally BID today.   - Continue home Entresto. Patient has been started on Hydralazine 10mg TID, Metoprolol 25 mg BID and Imdur 30mg PO daily while admitted and is tolerating well.   -Right and Left Heart Catheterization today with Dr. Farnsowrth; follow up results. -Outpatient cardiologist Julia Del Toro   - BNP 7,293 on admission, repeat BNP today: 2,312  - NST 01/2018: EF 28% and a small perfusion abnormality of moderate intensity in the apical region  - Echo 3/8/17: LV moderately dilated, severe global hypokinesis of LV with EF 20%, LA moderately dilated, mod-severe MR, mod-severe TR, mild pulmHTN with PAP 47mmHg, small pericardial effusion   -Repeat Limited Echo today to assess MR s/p IV Diuresis (3/12/2018): moderate to severe MR. Likely mechanism appears functional with posterior leaflet restriction.  Small circumferential pericardial effusion with arger collection adjacent to the right atrium.  No chamber collapse seen. Compared to prior study performed on 3/8/18, no significant change in MR.  -Repeat Frontal CXR (3/11/2018): Enlarged cardiac silhouette, consistent with cardiomegaly and   pericardial effusion.  -s/p Lasix 40 mg IV BID x four days with transition to Lasix 20 mg orally BID today.   - Continue home Entresto. Patient has been started on Hydralazine 10mg TID, Metoprolol 25 mg BID and Imdur 30mg PO daily while admitted and is tolerating well.   - s/p right and left heart catheterization (3/12/2018):LM normal, mild luminal irregularities LAD and dLCx, pD1 50%, RCA normal. LVEF , LVEDP 11 mmHG, 3+ MR.  RHC: RA- 7mmHg, RV- 49/0(11), PA- 44/15(27), PCWP- 11mmHg, Ao/PA sat- 96%/67%, CO/CI- 4.28/2.45

## 2018-03-12 NOTE — CONSULT NOTE ADULT - ASSESSMENT
56 y/o female, former smoker, former drug abuser (quit 2006) with a PMHx of HIV diagnosed in 1997 (viral load unknown), schizophrenia? (was prescribed Risperdol in past but does not take), asthma (prior ED visits but denies intubations), HTN, DMII, known mitral valve regurgitation, cardiomyopathy (EF 30% most recently), hx of right breast cancer diagnosed in 2017, s/p chemotherapy (finished 11/2017) s/p radical mastectomy w/ drain placement x 2 and tissue expander (1/26/2018) who presented to [facility] ED on 3/8/18 with the complaint of progressively worsening SOB. Found to have moderate-severe MR and small pericardial effusion.

## 2018-03-12 NOTE — PHYSICAL THERAPY INITIAL EVALUATION ADULT - ADDITIONAL COMMENTS
Lives with son, denies falls at home or within the community, no use of DME reported, patient stated she cant even walk 1 block before requiring a rest break.

## 2018-03-12 NOTE — PROGRESS NOTE ADULT - PROBLEM SELECTOR PLAN 4
-SBP 120s today.   - Continue home Entresto BID. Hydralazine 10 mg orally TID,  Metoprolol 25mg PO BID, and Imdur 30mg daily initiated this admission.

## 2018-03-13 ENCOUNTER — TRANSCRIPTION ENCOUNTER (OUTPATIENT)
Age: 58
End: 2018-03-13

## 2018-03-13 LAB
ANION GAP SERPL CALC-SCNC: 13 MMOL/L — SIGNIFICANT CHANGE UP (ref 5–17)
BUN SERPL-MCNC: 12 MG/DL — SIGNIFICANT CHANGE UP (ref 7–23)
CALCIUM SERPL-MCNC: 9.1 MG/DL — SIGNIFICANT CHANGE UP (ref 8.4–10.5)
CHLORIDE SERPL-SCNC: 104 MMOL/L — SIGNIFICANT CHANGE UP (ref 96–108)
CO2 SERPL-SCNC: 22 MMOL/L — SIGNIFICANT CHANGE UP (ref 22–31)
CREAT SERPL-MCNC: 0.8 MG/DL — SIGNIFICANT CHANGE UP (ref 0.5–1.3)
GLUCOSE BLDC GLUCOMTR-MCNC: 106 MG/DL — HIGH (ref 70–99)
GLUCOSE BLDC GLUCOMTR-MCNC: 125 MG/DL — HIGH (ref 70–99)
GLUCOSE BLDC GLUCOMTR-MCNC: 89 MG/DL — SIGNIFICANT CHANGE UP (ref 70–99)
GLUCOSE BLDC GLUCOMTR-MCNC: 96 MG/DL — SIGNIFICANT CHANGE UP (ref 70–99)
GLUCOSE BLDC GLUCOMTR-MCNC: 99 MG/DL — SIGNIFICANT CHANGE UP (ref 70–99)
GLUCOSE SERPL-MCNC: 94 MG/DL — SIGNIFICANT CHANGE UP (ref 70–99)
HCT VFR BLD CALC: 39.1 % — SIGNIFICANT CHANGE UP (ref 34.5–45)
HGB BLD-MCNC: 12.9 G/DL — SIGNIFICANT CHANGE UP (ref 11.5–15.5)
MAGNESIUM SERPL-MCNC: 2 MG/DL — SIGNIFICANT CHANGE UP (ref 1.6–2.6)
MCHC RBC-ENTMCNC: 33 G/DL — SIGNIFICANT CHANGE UP (ref 32–36)
MCHC RBC-ENTMCNC: 33.4 PG — SIGNIFICANT CHANGE UP (ref 27–34)
MCV RBC AUTO: 101.3 FL — HIGH (ref 80–100)
PLATELET # BLD AUTO: 177 K/UL — SIGNIFICANT CHANGE UP (ref 150–400)
POTASSIUM SERPL-MCNC: 4.1 MMOL/L — SIGNIFICANT CHANGE UP (ref 3.5–5.3)
POTASSIUM SERPL-SCNC: 4.1 MMOL/L — SIGNIFICANT CHANGE UP (ref 3.5–5.3)
RBC # BLD: 3.86 M/UL — SIGNIFICANT CHANGE UP (ref 3.8–5.2)
RBC # FLD: 13.2 % — SIGNIFICANT CHANGE UP (ref 10.3–16.9)
SODIUM SERPL-SCNC: 139 MMOL/L — SIGNIFICANT CHANGE UP (ref 135–145)
TSH SERPL-MCNC: 1.48 UIU/ML — SIGNIFICANT CHANGE UP (ref 0.35–4.94)
WBC # BLD: 2.9 K/UL — LOW (ref 3.8–10.5)
WBC # FLD AUTO: 2.9 K/UL — LOW (ref 3.8–10.5)

## 2018-03-13 PROCEDURE — 99233 SBSQ HOSP IP/OBS HIGH 50: CPT

## 2018-03-13 PROCEDURE — 93320 DOPPLER ECHO COMPLETE: CPT | Mod: 26

## 2018-03-13 PROCEDURE — 93325 DOPPLER ECHO COLOR FLOW MAPG: CPT | Mod: 26

## 2018-03-13 PROCEDURE — 93312 ECHO TRANSESOPHAGEAL: CPT | Mod: 26

## 2018-03-13 RX ORDER — ISOSORBIDE MONONITRATE 60 MG/1
1 TABLET, EXTENDED RELEASE ORAL
Qty: 0 | Refills: 0 | COMMUNITY
Start: 2018-03-13

## 2018-03-13 RX ORDER — METOPROLOL TARTRATE 50 MG
1 TABLET ORAL
Qty: 60 | Refills: 3 | OUTPATIENT
Start: 2018-03-13 | End: 2018-07-10

## 2018-03-13 RX ORDER — HYDRALAZINE HCL 50 MG
1 TABLET ORAL
Qty: 90 | Refills: 3 | OUTPATIENT
Start: 2018-03-13 | End: 2018-07-10

## 2018-03-13 RX ORDER — FUROSEMIDE 40 MG
1 TABLET ORAL
Qty: 60 | Refills: 1 | OUTPATIENT
Start: 2018-03-13 | End: 2018-05-11

## 2018-03-13 RX ADMIN — Medication 10 MILLIGRAM(S): at 05:32

## 2018-03-13 RX ADMIN — ISOSORBIDE MONONITRATE 30 MILLIGRAM(S): 60 TABLET, EXTENDED RELEASE ORAL at 17:34

## 2018-03-13 RX ADMIN — Medication 20 MILLIGRAM(S): at 05:32

## 2018-03-13 RX ADMIN — Medication 10 MILLIGRAM(S): at 21:46

## 2018-03-13 RX ADMIN — Medication 25 MILLIGRAM(S): at 05:32

## 2018-03-13 RX ADMIN — Medication 25 MILLIGRAM(S): at 17:34

## 2018-03-13 RX ADMIN — Medication 1250 MILLIGRAM(S): at 17:34

## 2018-03-13 RX ADMIN — Medication 20 MILLIGRAM(S): at 17:34

## 2018-03-13 RX ADMIN — SACUBITRIL AND VALSARTAN 1 TABLET(S): 24; 26 TABLET, FILM COATED ORAL at 17:34

## 2018-03-13 RX ADMIN — Medication 81 MILLIGRAM(S): at 17:34

## 2018-03-13 RX ADMIN — SACUBITRIL AND VALSARTAN 1 TABLET(S): 24; 26 TABLET, FILM COATED ORAL at 05:32

## 2018-03-13 NOTE — PROGRESS NOTE ADULT - PROBLEM SELECTOR PLAN 3
-Patient with prolonged QTc on admission 492 ms. Repeat 12 Lead EKG (3/11/2018): 527 ms. Repeat EKG (3/12/2018): 527 ms.  f/u repeat EKG  -Currently not on any QT prolonging agents.   -Discussed with Dr. Dominguez who is OK for discharge and pt to f/u with Dr. Del Toro (cardiologist) in 1-2 weeks

## 2018-03-13 NOTE — PROGRESS NOTE ADULT - PROBLEM SELECTOR PLAN 2
-History of MR.  CTS: Dr. John consulted: F/U recs  -On admission Echo (3/8/2018): Moderate to severe MR. Repeat Echo (3/12/2018) s/p IV diuresis revealed unchanged moderate to severe MR.   -s/p Right and Left heart cath (3/12/2018): 3+ MR  -JACY 3/13 revealed moderate concentric LVH, severe global hypokinesis of LV, LVEF 20-25%, LA dilated, no clot in LA or LA appendage, LA appendage emptying velocities reduced, RA size normal,  structurally normal aortic valve, regurgitant mitral volume 40 ml, mitral venta contracta 0.5 cm, structurally normal mitral valve, moderate mitral regurg, moderate pulm regurg, PASP 47, small pericardial effusion.

## 2018-03-13 NOTE — DIETITIAN INITIAL EVALUATION ADULT. - PROBLEM SELECTOR PLAN 4
-HIV Diagnosed in 1997. Can't recall her doctor  -Currently taking Combivir and Viracept at home   -F/U AM viral load and CD4 count  -OF NOTE: patient reports her family does NOT know she has HIV and requests this is NOT brought up infront of her family.

## 2018-03-13 NOTE — DISCHARGE NOTE ADULT - MEDICATION SUMMARY - MEDICATIONS TO TAKE
I will START or STAY ON the medications listed below when I get home from the hospital:    aspirin 81 mg oral tablet  -- 1 tab(s) by mouth once a day  -- Indication: For Prevention    Entresto 24 mg-26 mg oral tablet  -- 1 tab(s) by mouth 2 times a day  -- Indication: For congestive heart failure    isosorbide mononitrate 30 mg oral tablet, extended release  -- 1 tab(s) by mouth once a day  -- Indication: For Angina    metFORMIN 1000 mg oral tablet  -- 1 tab(s) by mouth 2 times a day  -- Indication: For Diabetes    Combivir 150 mg-300 mg oral tablet  -- 1 tab(s) by mouth 2 times a day  -- Indication: For HIV (human immunodeficiency virus infection)    Viracept  -- 1250 milligram(s) by mouth 2 times a day  -- Indication: For HIV (human immunodeficiency virus infection)    metoprolol tartrate 25 mg oral tablet  -- 1 tab(s) by mouth 2 times a day  -- Indication: For Hypertension    albuterol 90 mcg/inh inhalation powder  -- 2 puff(s) inhaled every 4 hours, As Needed  -- Indication: For Asthma    furosemide 20 mg oral tablet  -- 1 tab(s) by mouth every 12 hours  -- Indication: For congestive heart failure    hydrALAZINE 10 mg oral tablet  -- 1 tab(s) by mouth 3 times a day   -- Indication: For congestive heart failure I will START or STAY ON the medications listed below when I get home from the hospital:    aspirin 81 mg oral tablet  -- 1 tab(s) by mouth once a day  -- Indication: For Prevention    Entresto 24 mg-26 mg oral tablet  -- 1 tab(s) by mouth 2 times a day  -- Indication: For congestive heart failure    isosorbide mononitrate 30 mg oral tablet, extended release  -- 1 tab(s) by mouth once a day  -- Indication: For congestive heart failure    metFORMIN 1000 mg oral tablet  -- 1 tab(s) by mouth 2 times a day  -- Indication: For Diabetes    Combivir 150 mg-300 mg oral tablet  -- 1 tab(s) by mouth 2 times a day  -- Indication: For HIV (human immunodeficiency virus infection)    Viracept  -- 1250 milligram(s) by mouth 2 times a day  -- Indication: For HIV (human immunodeficiency virus infection)    metoprolol tartrate 25 mg oral tablet  -- 1 tab(s) by mouth 2 times a day  -- Indication: For Hypertension    albuterol 90 mcg/inh inhalation powder  -- 2 puff(s) inhaled every 4 hours, As Needed  -- Indication: For Asthma    furosemide 20 mg oral tablet  -- 1 tab(s) by mouth every 12 hours  -- Indication: For congestive heart failure    hydrALAZINE 10 mg oral tablet  -- 1 tab(s) by mouth 3 times a day   -- Indication: For congestive heart failure I will START or STAY ON the medications listed below when I get home from the hospital:    aspirin 81 mg oral tablet  -- 1 tab(s) by mouth once a day  -- Indication: For Prevention    Entresto 24 mg-26 mg oral tablet  -- 1 tab(s) by mouth 2 times a day  -- Indication: For congestive heart failure    isosorbide mononitrate 30 mg oral tablet, extended release  -- 1 tab(s) by mouth once a day  -- Indication: For congestive heart failure    metFORMIN 1000 mg oral tablet  -- 1 tab(s) by mouth 2 times a day  -- Indication: For Diabetes    Pravachol 20 mg oral tablet  -- 1 tab(s) by mouth once a day   -- Do not take this drug if you are pregnant.  It is very important that you take or use this exactly as directed.  Do not skip doses or discontinue unless directed by your doctor.  Obtain medical advice before taking any non-prescription drugs as some may affect the action of this medication.  Take with food or milk.    -- Indication: For cholesterol    Viracept  -- 1250 milligram(s) by mouth 2 times a day  -- Indication: For HIV (human immunodeficiency virus infection)    Combivir 150 mg-300 mg oral tablet  -- 1 tab(s) by mouth 2 times a day  -- Indication: For HIV (human immunodeficiency virus infection)    metoprolol tartrate 25 mg oral tablet  -- 1 tab(s) by mouth 2 times a day  -- Indication: For Hypertension    albuterol 90 mcg/inh inhalation powder  -- 2 puff(s) inhaled every 4 hours, As Needed  -- Indication: For Asthma    furosemide 20 mg oral tablet  -- 1 tab(s) by mouth every 12 hours  -- Indication: For congestive heart failure    hydrALAZINE 10 mg oral tablet  -- 1 tab(s) by mouth 3 times a day   -- Indication: For congestive heart failure

## 2018-03-13 NOTE — DIETITIAN INITIAL EVALUATION ADULT. - PROBLEM SELECTOR PLAN 1
-BNP 7,293. EF: 40% by prior Nuclear Stress Test on prior H+P ([employer] currently not working)  -Daily weights, strict I/Os  - Preliminary Echo read from Dr. Guerin (3/8/2018): EF 20%, moderate to severe MR and moderate to severe TR. Small pericardial effusion. Left Atrial pressure severely elevated.   - s/p Lasix 40 mg IV x one dose in ED. Will order Lasix 40 mg IV BID; patient needs aggressive diuresis as discussed with Dr. Dominguez. Will plan on repeat limited Echo on Saturday to assess severity of MR with tentative plan for JACY and R+L heart catheterization if medically optimized.   -CTA PE Protocol consistent with CHF  - Will continue home Entresto BID per Dr. Dominguez  -Patient is a POOR HISTORIAN; unsure of all her doctors; looked at scripts in sunrise and called prescribers ([facility]). Including Cardiologist Dr. Danilo Kimball (who has no record of her as a patient: [phone]). In addition called the office of second prescriber: [phone] and [phone] who stated she has never come to office). Her daughter is supposed to bring in the cards of her doctors. Also check [employer] when working.

## 2018-03-13 NOTE — DISCHARGE NOTE ADULT - PLAN OF CARE
Please follow up You have been diagnosed with mitral regurgitation, which is a leakage of blood backward through the mitral valve each time the left ventricle contracts. You underwent a transesophageal echocardiogram which allowed us to take a better look at your mitral valve. This determined that you had moderate mitral regurgitation. Please follow up, continue medications You were found to have a low EF of 20-25% which means that your heart is only pumping 20-25% of your blood out of your left ventricle (its main pumping chamber) every time your heart beats (regular 55-60%). This puts you at an increased risk of developing an abnormal heart rhythm. Please start lasix (water pill) 20 mg every 12 hours to minimize any risk of developing swelling to your legs and start taking metoprolol tartrate 25 mg twice daily to control  your heart rate and decrease the risk for arrhythmia. Continue entresto 1 tablet twice daily.  Maintain a low salt diet and weigh yourself daily. For any significant increases in daily weight with associated swelling in the legs or abdomen with shortness of breath, please call your doctor or go to the emergency room. Please follow up with Cardiologist Dr. Julia Del Toro in 1-2 weeks after discharge who will continue to monitor your heart with repeat Echocardiograms to see if your ejection fraction improves with medication optimization. Your hemoglobin A1C, measure of overall blood sugar over past 3 months, was 5.1% which means that your blood sugar has been well controlled.  Maintain a low carbohydrate diet. Check your blood sugar regularly. For blood sugar that is too high or too low please call your doctor or go to the emergency room as necessary. Continue meds, please follow up Continue taking imdur 30 mg daily and start taking hydralazine 10 mg 3 times per day. For blood pressure that is too high or too low please see your doctor or go to the emergency room as necessary. Please follow up with Cardiologist Dr. Julia Del Toro in 1-2 weeks after discharge. Your total cholesterol was 150, your LDL was 86, and your HDL was 49.  Your 10 year risk of having a myocardial infarction (heart attack) or stroke is 7.3% in the next 10 years. Follow up with  your Cardiologist Dr. Julia Del Toro about possibility of starting cholesterol lowering medication in future. You have a prolonged QTc of 527 on your EKG which puts you an increased risk for ventricular arrhythmia. Please follow up with you Cardiologist Dr. Julia Del Toro in 1-2 weeks who will continue to monitor your QTc. Continue medication Your viral load was <30. Please continue your HIV medication as prescribed and follow up with your primary care provider in 1-2 weeks. Follow up You have been diagnosed with mitral regurgitation, which is a heart murmur which involves leakage of blood backward through the mitral valve each time the left ventricle contracts. You underwent a transesophageal echocardiogram which allowed us to take a better look at your mitral valve. This determined that you had moderate mitral regurgitation. Surgical intervention is usually considered at severe mitral regurgitation so it is vital that you follow up with Cardiothoracic surgery (Dr. John) as you may need surgery in the future for correction. Your viral load was <30. Please continue your HIV medication as prescribed and follow up with your primary care provider in 1-2 weeks.    Please resume your regular chemotherapy and radiation schedule outpatient for your breast cancer.  Follow up with your oncologist You were found to have a low EF of 20-25% which means that your heart is only pumping 20-25% of your blood out of your left ventricle (its main pumping chamber) every time your heart beats (regular 55-60%). This puts you at an increased risk of developing an abnormal heart rhythm. Please start lasix (water pill) 20 mg every 12 hours to minimize any risk of developing swelling to your legs and start taking metoprolol tartrate 25 mg twice daily to control  your heart rate and decrease the risk for arrhythmia. Continue entresto 1 tablet twice daily.  Maintain a low salt diet and weigh yourself daily. For any significant increases in daily weight with associated swelling in the legs or abdomen with shortness of breath, please call your doctor or go to the emergency room. Please follow up with Cardiologist Dr. Julia Del Toro in 1-2 weeks after discharge who will continue to monitor your heart with repeat Echocardiograms to see if your ejection fraction improves with medication optimization.    You had a cardiac catheterization to reveal if any blockages were causing this heart failure. You did not have any significant blockages in the heart. You need to follow up as this disease can progress.

## 2018-03-13 NOTE — DIETITIAN INITIAL EVALUATION ADULT. - PROBLEM SELECTOR PLAN 6
-Patient has been Risperdol 2 mg BID in past  -Patient reports she has an alter Ego and experiences hallucinations intermittently.   -Patient denies any current thoughts of harming herself. Cooperative during interview.     Dispo:  Will need ischemic work up this admission. F/U troponin @ 4 PM. Obtain collateral info from daughter when she arrives.

## 2018-03-13 NOTE — DIETITIAN INITIAL EVALUATION ADULT. - ENERGY NEEDS
Ht:5ft 4inches,IBW:120lbs+/-10%,127% of IBW.BMI:26.8,Increased kcal/protein needs.Fluids may need restriction due to CHF

## 2018-03-13 NOTE — PROGRESS NOTE ADULT - ASSESSMENT
56yo F, POOR HISTORIAN, former smoker and drug abuser (quit 2006) with a PMHx of HIV diagnosed in 1997 (viral load unknown), ?schizophrenia (was prescribed Risperdol in past but does not take), Asthma (prior ED visits/denies intubations), HTN, DM-II,  severe MR, cardiomyopathy (per prior H+P EF 40%, after stress test 28%), known right breast cancer diagnosed in 2017, s/p chemotherapy (finished 11/2017) s/p radical mastectomy w/ drain placement x 2 and tissue expander (1/26/2018) who presented to [facility] ED (3/8/2018) in acute on chronic systolic CHF exacerbation.
56yo F, POOR HISTORIAN, former smoker and drug abuser (quit 2006) with a PMHx of HIV diagnosed in 1997 (viral load unknown), ?schizophrenia (was prescribed Risperdol in past but does not take), Asthma (prior ED visits/denies intubations), HTN, DM-II,  severe MR, cardiomyopathy (per prior H+P EF 40%, after stress test 28%), known right breast cancer diagnosed in 2017, s/p chemotherapy (finished 11/2017) s/p radical mastectomy w/ drain placement x 2 and tissue expander (1/26/2018) who presented to [facility] ED (3/8/2018) in acute on chronic systolic CHF exacerbation.
56yo F, POOR HISTORIAN, former smoker and drug abuser (quit 2006) with a PMHx of HIV diagnosed in 1997 (viral load unknown), ?schizophrenia (was prescribed Risperdol in past but does not take), Asthma (prior ED visits/denies intubations), HTN, DM-II,  severe MR, cardiomyopathy (per prior H+P EF 40%, after stress test 28%), known right breast cancer diagnosed in 2017, s/p chemotherapy (finished 11/2017) s/p radical mastectomy w/ drain placement x 2 and tissue expander (1/26/2018) who presented to [facility] ED (3/8/2018) in acute on chronic systolic CHF exacerbation.
56yo F, POOR HISTORIAN, former smoker and drug abuser (quit 2006) with a PMHx of HIV diagnosed in 1997 (viral load unknown), ?schizophrenia (was prescribed Risperdol in past but does not take), Asthma (prior ED visits/denies intubations), HTN, DM-II,  severe MR, cardiomyopathy (per prior H+P EF 40%, after stress test 28%), known right breast cancer diagnosed in 2017, s/p chemotherapy (finished 11/2017) s/p radical mastectomy w/ drain placement x 2 and tissue expander (1/26/2018) who presented to [facility] ED (3/8/2018) in acute on chronic systolic CHF exacerbation in the setting of moderate MR.
58yo F, POOR HISTORIAN, former smoker and drug abuser (quit 2006) with a PMHx of HIV diagnosed in 1997 (viral load unknown), ?schizophrenia (was prescribed Risperdol in past but does not take), Asthma (prior ED visits/denies intubations), HTN, DM-II,  severe MR, cardiomyopathy (per prior H+P EF 40%, after stress test 28%), known right breast cancer diagnosed in 2017, s/p chemotherapy (finished 11/2017) s/p radical mastectomy w/ drain placement x 2 and tissue expander (1/26/2018) who presented to [facility] ED (3/8/2018) in acute on chronic systolic CHF exacerbation in the setting of moderate to severe MR.

## 2018-03-13 NOTE — DISCHARGE NOTE ADULT - PATIENT PORTAL LINK FT
You can access the [facility], offered by [facility], by registering with the following website: [email or web address]

## 2018-03-13 NOTE — DISCHARGE NOTE ADULT - CARE PLAN
Principal Discharge DX:	Mitral regurgitation  Goal:	Please follow up  Assessment and plan of treatment:	You have been diagnosed with mitral regurgitation, which is a leakage of blood backward through the mitral valve each time the left ventricle contracts. You underwent a transesophageal echocardiogram which allowed us to take a better look at your mitral valve. This determined that you had moderate mitral regurgitation.  Secondary Diagnosis:	Acute on chronic systolic CHF (congestive heart failure)  Goal:	Please follow up, continue medications  Assessment and plan of treatment:	You were found to have a low EF of 20-25% which means that your heart is only pumping 20-25% of your blood out of your left ventricle (its main pumping chamber) every time your heart beats (regular 55-60%). This puts you at an increased risk of developing an abnormal heart rhythm. Please start lasix (water pill) 20 mg every 12 hours to minimize any risk of developing swelling to your legs and start taking metoprolol tartrate 25 mg twice daily to control  your heart rate and decrease the risk for arrhythmia. Continue entresto 1 tablet twice daily.  Maintain a low salt diet and weigh yourself daily. For any significant increases in daily weight with associated swelling in the legs or abdomen with shortness of breath, please call your doctor or go to the emergency room. Please follow up with Cardiologist Dr. Julia Del Toro in 1-2 weeks after discharge who will continue to monitor your heart with repeat Echocardiograms to see if your ejection fraction improves with medication optimization.  Secondary Diagnosis:	Diabetes  Goal:	Please follow up  Assessment and plan of treatment:	Your hemoglobin A1C, measure of overall blood sugar over past 3 months, was 5.1% which means that your blood sugar has been well controlled.  Maintain a low carbohydrate diet. Check your blood sugar regularly. For blood sugar that is too high or too low please call your doctor or go to the emergency room as necessary.  Secondary Diagnosis:	HTN (hypertension)  Goal:	Continue meds, please follow up  Assessment and plan of treatment:	Continue taking imdur 30 mg daily and start taking hydralazine 10 mg 3 times per day. For blood pressure that is too high or too low please see your doctor or go to the emergency room as necessary. Please follow up with Cardiologist Dr. Julia Del Toro in 1-2 weeks after discharge.  Secondary Diagnosis:	HLD (hyperlipidemia)  Assessment and plan of treatment:	Your total cholesterol was 150, your LDL was 86, and your HDL was 49.  Your 10 year risk of having a myocardial infarction (heart attack) or stroke is 7.3% in the next 10 years. Follow up with  your Cardiologist Dr. Julia Del Toro about possibility of starting cholesterol lowering medication in future.  Secondary Diagnosis:	Prolonged QT interval  Goal:	Please follow up  Assessment and plan of treatment:	You have a prolonged QTc of 527 on your EKG which puts you an increased risk for ventricular arrhythmia. Please follow up with you Cardiologist Dr. Julia Del Toro in 1-2 weeks who will continue to monitor your QTc.  Secondary Diagnosis:	HIV (human immunodeficiency virus infection)  Goal:	Continue medication  Assessment and plan of treatment:	Your viral load was <30. Please continue your HIV medication as prescribed and follow up with your primary care provider in 1-2 weeks. Principal Discharge DX:	Mitral regurgitation  Goal:	Please follow up  Assessment and plan of treatment:	You have been diagnosed with mitral regurgitation, which is a leakage of blood backward through the mitral valve each time the left ventricle contracts. You underwent a transesophageal echocardiogram which allowed us to take a better look at your mitral valve. This determined that you had moderate mitral regurgitation.  Secondary Diagnosis:	Acute on chronic systolic CHF (congestive heart failure)  Goal:	Please follow up, continue medications  Assessment and plan of treatment:	You were found to have a low EF of 20-25% which means that your heart is only pumping 20-25% of your blood out of your left ventricle (its main pumping chamber) every time your heart beats (regular 55-60%). This puts you at an increased risk of developing an abnormal heart rhythm. Please start lasix (water pill) 20 mg every 12 hours to minimize any risk of developing swelling to your legs and start taking metoprolol tartrate 25 mg twice daily to control  your heart rate and decrease the risk for arrhythmia. Continue entresto 1 tablet twice daily.  Maintain a low salt diet and weigh yourself daily. For any significant increases in daily weight with associated swelling in the legs or abdomen with shortness of breath, please call your doctor or go to the emergency room. Please follow up with Cardiologist Dr. Julia Del Toro in 1-2 weeks after discharge who will continue to monitor your heart with repeat Echocardiograms to see if your ejection fraction improves with medication optimization.  Secondary Diagnosis:	Diabetes  Goal:	Please follow up  Assessment and plan of treatment:	Your hemoglobin A1C, measure of overall blood sugar over past 3 months, was 5.1% which means that your blood sugar has been well controlled.  Maintain a low carbohydrate diet. Check your blood sugar regularly. For blood sugar that is too high or too low please call your doctor or go to the emergency room as necessary.  Secondary Diagnosis:	HTN (hypertension)  Goal:	Continue meds, please follow up  Assessment and plan of treatment:	Continue taking imdur 30 mg daily and start taking hydralazine 10 mg 3 times per day. For blood pressure that is too high or too low please see your doctor or go to the emergency room as necessary. Please follow up with Cardiologist Dr. Julia Del Toro in 1-2 weeks after discharge.  Secondary Diagnosis:	HLD (hyperlipidemia)  Goal:	Follow up  Assessment and plan of treatment:	Your total cholesterol was 150, your LDL was 86, and your HDL was 49.  Your 10 year risk of having a myocardial infarction (heart attack) or stroke is 7.3% in the next 10 years. Follow up with  your Cardiologist Dr. Julia Del Toro about possibility of starting cholesterol lowering medication in future.  Secondary Diagnosis:	Prolonged QT interval  Goal:	Please follow up  Assessment and plan of treatment:	You have a prolonged QTc of 527 on your EKG which puts you an increased risk for ventricular arrhythmia. Please follow up with you Cardiologist Dr. Julia Del Toro in 1-2 weeks who will continue to monitor your QTc.  Secondary Diagnosis:	HIV (human immunodeficiency virus infection)  Goal:	Continue medication  Assessment and plan of treatment:	Your viral load was <30. Please continue your HIV medication as prescribed and follow up with your primary care provider in 1-2 weeks. Principal Discharge DX:	Mitral regurgitation  Goal:	Please follow up  Assessment and plan of treatment:	You have been diagnosed with mitral regurgitation, which is a heart murmur which involves leakage of blood backward through the mitral valve each time the left ventricle contracts. You underwent a transesophageal echocardiogram which allowed us to take a better look at your mitral valve. This determined that you had moderate mitral regurgitation. Surgical intervention is usually considered at severe mitral regurgitation so it is vital that you follow up with Cardiothoracic surgery (Dr. John) as you may need surgery in the future for correction.  Secondary Diagnosis:	Acute on chronic systolic CHF (congestive heart failure)  Goal:	Please follow up, continue medications  Assessment and plan of treatment:	You were found to have a low EF of 20-25% which means that your heart is only pumping 20-25% of your blood out of your left ventricle (its main pumping chamber) every time your heart beats (regular 55-60%). This puts you at an increased risk of developing an abnormal heart rhythm. Please start lasix (water pill) 20 mg every 12 hours to minimize any risk of developing swelling to your legs and start taking metoprolol tartrate 25 mg twice daily to control  your heart rate and decrease the risk for arrhythmia. Continue entresto 1 tablet twice daily.  Maintain a low salt diet and weigh yourself daily. For any significant increases in daily weight with associated swelling in the legs or abdomen with shortness of breath, please call your doctor or go to the emergency room. Please follow up with Cardiologist Dr. Julia Del Toro in 1-2 weeks after discharge who will continue to monitor your heart with repeat Echocardiograms to see if your ejection fraction improves with medication optimization.  Secondary Diagnosis:	Diabetes  Goal:	Please follow up  Assessment and plan of treatment:	Your hemoglobin A1C, measure of overall blood sugar over past 3 months, was 5.1% which means that your blood sugar has been well controlled.  Maintain a low carbohydrate diet. Check your blood sugar regularly. For blood sugar that is too high or too low please call your doctor or go to the emergency room as necessary.  Secondary Diagnosis:	HTN (hypertension)  Goal:	Continue meds, please follow up  Assessment and plan of treatment:	Continue taking imdur 30 mg daily and start taking hydralazine 10 mg 3 times per day. For blood pressure that is too high or too low please see your doctor or go to the emergency room as necessary. Please follow up with Cardiologist Dr. Julia Del Toro in 1-2 weeks after discharge.  Secondary Diagnosis:	HLD (hyperlipidemia)  Goal:	Follow up  Assessment and plan of treatment:	Your total cholesterol was 150, your LDL was 86, and your HDL was 49.  Your 10 year risk of having a myocardial infarction (heart attack) or stroke is 7.3% in the next 10 years. Follow up with  your Cardiologist Dr. Julia Del Toro about possibility of starting cholesterol lowering medication in future.  Secondary Diagnosis:	Prolonged QT interval  Goal:	Please follow up  Assessment and plan of treatment:	You have a prolonged QTc of 527 on your EKG which puts you an increased risk for ventricular arrhythmia. Please follow up with you Cardiologist Dr. Julia Del Toro in 1-2 weeks who will continue to monitor your QTc.  Secondary Diagnosis:	HIV (human immunodeficiency virus infection)  Goal:	Continue medication  Assessment and plan of treatment:	Your viral load was <30. Please continue your HIV medication as prescribed and follow up with your primary care provider in 1-2 weeks.    Please resume your regular chemotherapy and radiation schedule outpatient for your breast cancer.  Follow up with your oncologist Principal Discharge DX:	Mitral regurgitation  Goal:	Please follow up  Assessment and plan of treatment:	You have been diagnosed with mitral regurgitation, which is a heart murmur which involves leakage of blood backward through the mitral valve each time the left ventricle contracts. You underwent a transesophageal echocardiogram which allowed us to take a better look at your mitral valve. This determined that you had moderate mitral regurgitation. Surgical intervention is usually considered at severe mitral regurgitation so it is vital that you follow up with Cardiothoracic surgery (Dr. John) as you may need surgery in the future for correction.  Secondary Diagnosis:	Acute on chronic systolic CHF (congestive heart failure)  Goal:	Please follow up, continue medications  Assessment and plan of treatment:	You were found to have a low EF of 20-25% which means that your heart is only pumping 20-25% of your blood out of your left ventricle (its main pumping chamber) every time your heart beats (regular 55-60%). This puts you at an increased risk of developing an abnormal heart rhythm. Please start lasix (water pill) 20 mg every 12 hours to minimize any risk of developing swelling to your legs and start taking metoprolol tartrate 25 mg twice daily to control  your heart rate and decrease the risk for arrhythmia. Continue entresto 1 tablet twice daily.  Maintain a low salt diet and weigh yourself daily. For any significant increases in daily weight with associated swelling in the legs or abdomen with shortness of breath, please call your doctor or go to the emergency room. Please follow up with Cardiologist Dr. Julia Del Toro in 1-2 weeks after discharge who will continue to monitor your heart with repeat Echocardiograms to see if your ejection fraction improves with medication optimization.    You had a cardiac catheterization to reveal if any blockages were causing this heart failure. You did not have any significant blockages in the heart. You need to follow up as this disease can progress.  Secondary Diagnosis:	Diabetes  Goal:	Please follow up  Assessment and plan of treatment:	Your hemoglobin A1C, measure of overall blood sugar over past 3 months, was 5.1% which means that your blood sugar has been well controlled.  Maintain a low carbohydrate diet. Check your blood sugar regularly. For blood sugar that is too high or too low please call your doctor or go to the emergency room as necessary.  Secondary Diagnosis:	HTN (hypertension)  Goal:	Continue meds, please follow up  Assessment and plan of treatment:	Continue taking imdur 30 mg daily and start taking hydralazine 10 mg 3 times per day. For blood pressure that is too high or too low please see your doctor or go to the emergency room as necessary. Please follow up with Cardiologist Dr. Julia Del Toro in 1-2 weeks after discharge.  Secondary Diagnosis:	HLD (hyperlipidemia)  Goal:	Follow up  Assessment and plan of treatment:	Your total cholesterol was 150, your LDL was 86, and your HDL was 49.  Your 10 year risk of having a myocardial infarction (heart attack) or stroke is 7.3% in the next 10 years. Follow up with  your Cardiologist Dr. Julia Del Toro about possibility of starting cholesterol lowering medication in future.  Secondary Diagnosis:	Prolonged QT interval  Goal:	Please follow up  Assessment and plan of treatment:	You have a prolonged QTc of 527 on your EKG which puts you an increased risk for ventricular arrhythmia. Please follow up with you Cardiologist Dr. Julia Del Toro in 1-2 weeks who will continue to monitor your QTc.  Secondary Diagnosis:	HIV (human immunodeficiency virus infection)  Goal:	Continue medication  Assessment and plan of treatment:	Your viral load was <30. Please continue your HIV medication as prescribed and follow up with your primary care provider in 1-2 weeks.    Please resume your regular chemotherapy and radiation schedule outpatient for your breast cancer.  Follow up with your oncologist

## 2018-03-13 NOTE — DISCHARGE NOTE ADULT - CARE PROVIDER_API CALL
Julia Del Toro), Cardiology; Internal Medicine  [address] [place] [address]  Phone: [phone]  Fax: [phone] Julia Del Toro), Cardiology; Internal Medicine  [address] [facility] [address]  [place] [address]  Phone: [phone]  Fax: [phone]    Alanna Dominguez), Internal Medicine  [address] [id number]  Phone: [phone]  Fax: [phone]    Franklin John), Cardiovascular Surgery  [address]  [address] [place] [address]  Phone: [phone]  Fax: [phone]

## 2018-03-13 NOTE — PROGRESS NOTE ADULT - PROBLEM SELECTOR PROBLEM 1
Acute on chronic systolic CHF (congestive heart failure)

## 2018-03-13 NOTE — DISCHARGE NOTE ADULT - ADDITIONAL INSTRUCTIONS
Please follow up with Dr. Dominguez in 2 weeks. Please follow up with Dr. John in the future. Please return to the emergency room if symptoms return/worsen including not limited to chest pain, shortness of breath.  See your oncologist and primary care doctor as soon as you are discharged from hospital.

## 2018-03-13 NOTE — PROGRESS NOTE ADULT - SUBJECTIVE AND OBJECTIVE BOX
Interventional Cardiology PA Adult Progress Note    CC: worsening SOB upon admit    Subjective Assessment:  Pt seen and evaluated at bedside. Pt denies CP, SOB, dizziness, palpitations, abdominal pain, N/V  12 point review of systems otherwise negative  	  MEDICATIONS:  furosemide    Tablet 20 milliGRAM(s) Oral every 12 hours  hydrALAZINE 10 milliGRAM(s) Oral three times a day  isosorbide   mononitrate ER Tablet (IMDUR) 30 milliGRAM(s) Oral daily  metoprolol     tartrate 25 milliGRAM(s) Oral two times a day  sacubitril 24 mG/valsartan 26 mG 1 Tablet(s) Oral two times a day  nelfinavir 1250 milliGRAM(s) Oral two times a day  zidovudine 300 mG/lamiVUDine 150 mG 1 Tablet(s) Oral two times a day  dextrose 50% Injectable 12.5 Gram(s) IV Push once  dextrose 50% Injectable 25 Gram(s) IV Push once  dextrose 50% Injectable 25 Gram(s) IV Push once  dextrose Gel 1 Dose(s) Oral once PRN  glucagon  Injectable 1 milliGRAM(s) IntraMuscular once PRN  insulin lispro (HumaLOG) corrective regimen sliding scale   SubCutaneous Before meals and at bedtime  aspirin enteric coated 81 milliGRAM(s) Oral daily  dextrose 5%. 1000 milliLiter(s) IV Continuous <Continuous>  	    [PHYSICAL EXAM:  TELEMETRY:  T(C): 36.4 (03-13-18 @ 14:55), Max: 36.7 (03-12-18 @ 18:46)  HR: 88 (03-13-18 @ 12:49) (80 - 88)  BP: 131/74 (03-13-18 @ 12:49) (114/65 - 131/74)  RR: 18 (03-13-18 @ 12:49) (18 - 18)  SpO2: 99% (03-13-18 @ 12:49) (99% - 100%)  Wt(kg): --  I&O's Summary    12 Mar 2018 07:01  -  13 Mar 2018 07:00  --------------------------------------------------------  IN: 360 mL / OUT: 550 mL / NET: -190 mL    13 Mar 2018 07:01  -  13 Mar 2018 18:25  --------------------------------------------------------  IN: 180 mL / OUT: 0 mL / NET: 180 mL                                Appearance: WD/WN	  HEENT:    EOMI	  Neck:  - JVD  Cardiovascular: +S1, S2, +holosystolic murmur  Pulm: CTA B/L, diminished breath sounds at bases  Gastrointestinal:  Soft, Non-tender, + BS	  Extremities: No edema B/L, warm to touch  Vascular: Peripheral pulses palpable 2+ bilaterally  Neurologic: Non-focal  Psychiatry: A & O x 3, Mood & affect appropriate, no focal deficits                            12.9   2.9   )-----------( 177      ( 13 Mar 2018 05:33 )             39.1     03-13    139  |  104  |  12  ----------------------------<  94  4.1   |  22  |  0.80    Ca    9.1      13 Mar 2018 05:33  Mg     2.0     03-13      TSH: Thyroid Stimulating Hormone, Serum: 1.480 uIU/mL (03-13 @ 05:33)  PT/INR - ( 12 Mar 2018 06:51 )   PT: 13.1 sec;   INR: 1.18     PTT - ( 12 Mar 2018 06:51 )  PTT:43.5 sec

## 2018-03-13 NOTE — DIETITIAN INITIAL EVALUATION ADULT. - NUTRITION INTERVENTIONS
honor food requests.Trend daily weights.Add MVI to regimen/food preferences as requested by patient (specify)/other

## 2018-03-13 NOTE — DISCHARGE NOTE ADULT - CARE PROVIDERS DIRECT ADDRESSES
,DirectAddress_Unknown ,DirectAddress_Unknown,[email or web address]

## 2018-03-13 NOTE — PROGRESS NOTE ADULT - PROBLEM SELECTOR PLAN 4
-SBP 120s-130s today.   - Continue home Entresto BID. Hydralazine 10 mg orally TID,  Metoprolol 25mg PO BID, and Imdur 30mg daily initiated this admission.

## 2018-03-13 NOTE — DISCHARGE NOTE ADULT - SECONDARY DIAGNOSIS.
Acute on chronic systolic CHF (congestive heart failure) Diabetes HTN (hypertension) HLD (hyperlipidemia) Prolonged QT interval HIV (human immunodeficiency virus infection)

## 2018-03-13 NOTE — PROGRESS NOTE ADULT - PROBLEM SELECTOR PLAN 7
- Patient has been Risperdol 2 mg BID in past. Patient reports she has an alter Ego and experiences hallucinations intermittently. Patient currently denies SI and HI. Pt is cooperative.

## 2018-03-13 NOTE — DIETITIAN INITIAL EVALUATION ADULT. - NS AS NUTRI DX NUTRIENT
December 20, 2017      TERRENCE Tirado Jr., MD  [address] [facility] [address]           [facility]  [address] [place] [address]  [place] [address]  Phone: [phone]  Fax: [phone]          Patient: Flor Luciano   MR Number: [id number]   YOB: 1970   Date of Visit: 12/20/2017       Dear Dr. TERRENCE Tirado Jr.:    Thank you for referring Flor Luciano to me for evaluation. Attached you will find relevant portions of my assessment and plan of care.    If you have questions, please do not hesitate to call me. I look forward to following Flor Luciano along with you.    Sincerely,    Margarito Butler IV, MD    Enclosure  CC:  No Recipients    If you would like to receive this communication electronically, please contact [email or web address] or [phone] to request more information on [employer] access.    For providers and/or their staff who would like to refer a patient to [facility], please contact us through our one-stop-shop provider referral line, [facility], at [phone].    If you feel you have received this communication in error or would no longer like to receive these types of communications, please e-mail [email or web address]          Increased nutrient needs (specify)

## 2018-03-13 NOTE — PROGRESS NOTE ADULT - PROBLEM SELECTOR PROBLEM 8
Physical therapy evaluation, initial

## 2018-03-13 NOTE — PROGRESS NOTE ADULT - PROBLEM SELECTOR PLAN 8
-Lives with her children  -PT evaluation (3/12/2018): no needs     DVT PPx: Heparin SC.     Dispo: Possible D/C tomorrow pending CTS recommendation.    Case discussed with Dr. Dominguez this AM

## 2018-03-13 NOTE — DISCHARGE NOTE ADULT - HOSPITAL COURSE
56 yo female, POOR HISTORIAN, former smoker, former drug abuser (quit 2006) with a PMHx of HIV diagnosed in 1997 (viral load unknown), schizophrenia?(was prescribed Risperdol in past but does not take), Asthma (prior ED visits/denies intubations), HTN, DMII,  severe mitral valve regurgitation,  cardiomyopathy (per prior H+P EF 40%, after stress test 28%: All scripts currently not working), hx of right breast cancer diagnosed in 2017, s/p chemotherapy (finished 11/2017) s/p radical mastectomy w/ drain placement x 2 and tissue expander (1/26/2018) presented to [facility] ED (3/8/2018) with the complaint of progressively worsening SOB. Pt reports since she started chemotherapy last year she had experienced progressively worsening SOB. She reports she cannot walk more than 100 feet before become dyspneic and at times suffers from PND and orthopnea. Upon awakening this morning, she also developed left arm numbness/tingling/pain independent of activity (which is new for her). She denies dizziness, palpitations, chest pain, fever, chills, leg edema, headache, recent travel. Today she went to see her plastic surgeon Dr. Costa for an injection in preparation for upcoming reconstructive surgery and was referred to ED for SOB. Of note prior to recent mastectomy 1/2018 patient received cardiac clearance from  , completed Nuclear Stress Test, was told there no blockages in her heart but ejection fraction was low and was started on Entresto therapy.  Upon arrival to ED; Vital signs revealed: /95, RR: 20, Afebrile, HR: . 12 Lead EKG TWI infero-laterally (changed from prior EKG). Pertinent lab values include WBC: 3.6, AST: 66, Troponin 0.03, BNP 7,293. STAT CTA PE Protocol revealed no PE.  Mild to moderate bilateral pleural effusions, small pericardial effusion, cardiomegaly, and increased right heart pressure, which may indicate a component of CHF. Patient was initially given 1L Fluid bolus which was discontinued once BNP level was reviewed for which patient received Lasix 40 mg IV x one dose. Patient is now admitted to [facility] for acute on chronic systolic CHF exacerbation.   TTE on 3/12/18 revealed mild mitral valve thickening, moderate to severe mitral regurgitation at a blood pressure of 120/69 mmHg, Likely mechanism appears functional with posterior leaflet restriction, small circumferential pericardial effusion with a larger collection adjacent to the right atrium, no chamber collapse seen, when compared to prior study performed on 3/8/18, there is no significant change in mitral regurgitation. Underwent LHC/RHC on 3/12/18 which revealed mild luminal irregularities, pD1 50%, RCA normal, LVEF 30-35%, LVEDP 11, 3+ MR, RA 7 mmHg, RV 49/0, PA 44/15, PCWP 11, Ao/PA sat 96%/67%. CO/CI 4.28/2.45 . Underwent JACY on 3/13 revealed moderate concentric LVH, severe global hypokinesis of LV, LVEF 20-25%, LA dilated, no clot in LA or LA appendage, LA appendage emptying velocities reduced, RA size normal,  structurally normal aortic valve, regurgitant mitral volume 40 ml, mitral venta contracta 0.5 cm, structurally normal mitral valve, moderate mitral regurg, moderate pulm regurg, PASP 47, small pericardial effusion. No significant labs and no significant events on telemetry overnight.  Lasix transitioned to 20 mg PO BID, metoprolol 25 mg BID started and pt to continue on entresto outpatient. Patient has been medically cleared by Dr. Dominguez for discharge. Patient has been given appropriate discharge instructions including medication regimen and follow up. Medications that patient needs refills on e-prescribed to pt's preferred pharmacy.     Temp: 97.1F, HR 85, /64, RR 18, SpO2 100% on RA  Gen: NAD, A&O x3  Cards: RRR, clear S1 and S2 without murmur  Pulm; CTA B/L, no w/r/r  Right groin: without hematoma or ooze. DP/PT 1+ B/L, no femoral bruit ascultated  Abd: soft, NT  Ext: No LE edema or ulcerations B/L 56 yo female, POOR HISTORIAN, former smoker, former drug abuser (quit 2006) with a PMHx of HIV diagnosed in 1997 (viral load unknown), schizophrenia?(was prescribed Risperdol in past but does not take), Asthma (prior ED visits/denies intubations), HTN, DMII,  severe mitral valve regurgitation,  cardiomyopathy (per prior H+P EF 40%, after stress test 28%: All scripts currently not working), hx of right breast cancer diagnosed in 2017, s/p chemotherapy (finished 11/2017) s/p radical mastectomy w/ drain placement x 2 and tissue expander (1/26/2018) presented to [facility] ED (3/8/2018) with the complaint of progressively worsening SOB. Pt reports since she started chemotherapy last year she had experienced progressively worsening SOB. She reports she cannot walk more than 100 feet before become dyspneic and at times suffers from PND and orthopnea. Upon awakening this morning, she also developed left arm numbness/tingling/pain independent of activity (which is new for her). She denies dizziness, palpitations, chest pain, fever, chills, leg edema, headache, recent travel. Today she went to see her plastic surgeon Dr. Costa for an injection in preparation for upcoming reconstructive surgery and was referred to ED for SOB. Of note prior to recent mastectomy 1/2018 patient received cardiac clearance from  , completed Nuclear Stress Test, was told there no blockages in her heart but ejection fraction was low and was started on Entresto therapy.  Upon arrival to ED; Vital signs revealed: /95, RR: 20, Afebrile, HR: . 12 Lead EKG TWI infero-laterally (changed from prior EKG). Pertinent lab values include WBC: 3.6, AST: 66, Troponin 0.03, BNP 7,293. STAT CTA PE Protocol revealed no PE.  Mild to moderate bilateral pleural effusions, small pericardial effusion, cardiomegaly, and increased right heart pressure, which may indicate a component of CHF. Patient was initially given 1L Fluid bolus which was discontinued once BNP level was reviewed for which patient received Lasix 40 mg IV x one dose. Patient is now admitted to [facility] for acute on chronic systolic CHF exacerbation.   TTE on 3/12/18 revealed mild mitral valve thickening, moderate to severe mitral regurgitation at a blood pressure of 120/69 mmHg, Likely mechanism appears functional with posterior leaflet restriction, small circumferential pericardial effusion with a larger collection adjacent to the right atrium, no chamber collapse seen, when compared to prior study performed on 3/8/18, there is no significant change in mitral regurgitation. Underwent LHC/RHC on 3/12/18 which revealed mild luminal irregularities, pD1 50%, RCA normal, LVEF 30-35%, LVEDP 11, 3+ MR, RA 7 mmHg, RV 49/0, PA 44/15, PCWP 11, Ao/PA sat 96%/67%. CO/CI 4.28/2.45 . Underwent JACY on 3/13 revealed moderate concentric LVH, severe global hypokinesis of LV, LVEF 20-25%, LA dilated, no clot in LA or LA appendage, LA appendage emptying velocities reduced, RA size normal,  structurally normal aortic valve, regurgitant mitral volume 40 ml, mitral venta contracta 0.5 cm, structurally normal mitral valve, moderate mitral regurg, moderate pulm regurg, PASP 47, small pericardial effusion. No significant labs and no significant events on telemetry overnight.  Lasix transitioned to 20 mg PO BID, metoprolol 25 mg BID started and pt to continue on entresto outpatient. Patient has been medically cleared by Dr. Dominguez for discharge. Patient has been given appropriate discharge instructions including medication regimen and follow up. Medications that patient needs refills on e-prescribed to pt's preferred pharmacy.     Temp: 97.1F, HR 85, /64, RR 18, SpO2 100% on RA  Gen: NAD, A&O x3  Cards: RRR, clear S1 and S2 without murmur  Pulm; CTA B/L, no w/r/r  Right groin: without hematoma or ooze. DP/PT 1+ B/L, no femoral bruit ascultated  Abd: soft, NT  Ext: No LE edema or ulcerations B/L    Instructed to return to ED if sx return including not limited to chest pain, shortness of breath. pt is to resume chemo/radiation as otpt. denies any complaints o/n- fever, chills, chest pain, shortness of breath. Cleared by attending for d/c today. 56 yo female, POOR HISTORIAN, former smoker, former drug abuser (quit 2006) with a PMHx of HIV diagnosed in 1997 (viral load unknown), schizophrenia?(was prescribed Risperdol in past but does not take), Asthma (prior ED visits/denies intubations), HTN, DMII,  severe mitral valve regurgitation,  cardiomyopathy (per prior H+P EF 40%, after stress test 28%: All scripts currently not working), hx of right breast cancer diagnosed in 2017, s/p chemotherapy (finished 11/2017) s/p radical mastectomy w/ drain placement x 2 and tissue expander (1/26/2018) presented to [facility] ED (3/8/2018) with the complaint of progressively worsening SOB. Pt reports since she started chemotherapy last year she had experienced progressively worsening SOB. She reports she cannot walk more than 100 feet before become dyspneic and at times suffers from PND and orthopnea. Upon awakening this morning, she also developed left arm numbness/tingling/pain independent of activity (which is new for her). She denies dizziness, palpitations, chest pain, fever, chills, leg edema, headache, recent travel. Today she went to see her plastic surgeon Dr. Costa for an injection in preparation for upcoming reconstructive surgery and was referred to ED for SOB. Of note prior to recent mastectomy 1/2018 patient received cardiac clearance from  , completed Nuclear Stress Test, was told there no blockages in her heart but ejection fraction was low and was started on Entresto therapy.  Upon arrival to ED; Vital signs revealed: /95, RR: 20, Afebrile, HR: . 12 Lead EKG TWI infero-laterally (changed from prior EKG). Pertinent lab values include WBC: 3.6, AST: 66, Troponin 0.03, BNP 7,293. STAT CTA PE Protocol revealed no PE.  Mild to moderate bilateral pleural effusions, small pericardial effusion, cardiomegaly, and increased right heart pressure, which may indicate a component of CHF. Patient was initially given 1L Fluid bolus which was discontinued once BNP level was reviewed for which patient received Lasix 40 mg IV x one dose. Patient is now admitted to [facility] for acute on chronic systolic CHF exacerbation.   TTE on 3/12/18 revealed mild mitral valve thickening, moderate to severe mitral regurgitation at a blood pressure of 120/69 mmHg, Likely mechanism appears functional with posterior leaflet restriction, small circumferential pericardial effusion with a larger collection adjacent to the right atrium, no chamber collapse seen, when compared to prior study performed on 3/8/18, there is no significant change in mitral regurgitation. Underwent LHC/RHC on 3/12/18 which revealed mild luminal irregularities, pD1 50%, RCA normal, LVEF 30-35%, LVEDP 11, 3+ MR, RA 7 mmHg, RV 49/0, PA 44/15, PCWP 11, Ao/PA sat 96%/67%. CO/CI 4.28/2.45 . Underwent JACY on 3/13 revealed moderate concentric LVH, severe global hypokinesis of LV, LVEF 20-25%, LA dilated, no clot in LA or LA appendage, LA appendage emptying velocities reduced, RA size normal,  structurally normal aortic valve, regurgitant mitral volume 40 ml, mitral venta contracta 0.5 cm, structurally normal mitral valve, moderate mitral regurg, moderate pulm regurg, PASP 47, small pericardial effusion. No significant labs and no significant events on telemetry overnight.  Lasix transitioned to 20 mg PO BID, metoprolol 25 mg BID started and pt to continue on entresto outpatient. Patient has been medically cleared by Dr. Dominguez for discharge. Patient has been given appropriate discharge instructions including medication regimen and follow up, to resume chemo and radiation otpt. Medications that patient needs refills on e-prescribed to pt's preferred pharmacy.     Vital Signs Last 24 Hrs  T(C): 36.2 (14 Mar 2018 10:32), Max: 36.4 (13 Mar 2018 14:55)  T(F): 97.1 (14 Mar 2018 10:32), Max: 97.6 (13 Mar 2018 14:55)  HR: 88 (14 Mar 2018 08:57) (85 - 90)  BP: 135/67 (14 Mar 2018 08:57) (124/72 - 135/91)  BP(mean): --  RR: 18 (14 Mar 2018 08:57) (16 - 18)  SpO2: 98% (14 Mar 2018 08:57) (98% - 99%)  Gen: NAD, A&O x3  Cards: RRR, clear S1 and S2 without murmur  Pulm; CTA B/L, no w/r/r  Right groin: without hematoma or ooze. DP/PT 1+ B/L, no femoral bruit auscultated  Abd: soft, NT  Ext: No LE edema or ulcerations B/L    Instructed to return to ED if sx return including not limited to chest pain, shortness of breath. pt is to resume chemo/radiation as otpt. denies any complaints o/n- fever, chills, chest pain, shortness of breath. Cleared by attending for d/c today. All questions answered and pt verbalizes understanding.

## 2018-03-13 NOTE — DIETITIAN INITIAL EVALUATION ADULT. - OTHER INFO
56 Y/O Female admitted with CHF exacerbation/mitral regurgitation/SOB.Noted history of HIV+ and type 2 DM.Patient with admitted little diet restrictions PTA.Resides with son and maintains responsibility of shopping and cooking ,but generally does not follow any specific restrictions in diet.Claims her BS are always low so doesn't need a diet.Denied N/V/D or pain at present.Surgical incision from breast cancer/skin intact

## 2018-03-13 NOTE — PROGRESS NOTE ADULT - PROBLEM SELECTOR PLAN 1
-Outpatient cardiologist Julia Del Toro   - BNP 7,293 on admission, repeat BNP today: 2,312  - Net neg 1135, neg + 180 ml x 24 hours  - NST 01/2018: EF 28% and a small perfusion abnormality of moderate intensity in the apical region  - Echo 3/8/17: LV moderately dilated, severe global hypokinesis of LV with EF 20%, LA moderately dilated, mod-severe MR, mod-severe TR, mild pulmHTN with PAP 47mmHg, small pericardial effusion   -Repeat Limited Echo 3/12 to assess MR s/p IV Diuresis (3/12/2018): moderate to severe MR. Likely mechanism appears functional with posterior leaflet restriction.  Small circumferential pericardial effusion with arger collection adjacent to the right atrium.  No chamber collapse seen. Compared to prior study performed on 3/8/18, no significant change in MR.  -Repeat Frontal CXR (3/11/2018): Enlarged cardiac silhouette, consistent with cardiomegaly and pericardial effusion.  -s/p Lasix 40 mg IV BID x four days with transition to Lasix 20 mg orally BID yesterday  - Continue home Entresto. Patient has been started on Hydralazine 10mg TID, Metoprolol 25 mg BID and Imdur 30mg PO daily while admitted and is tolerating well.   - s/p right and left heart catheterization (3/12/2018):LM normal, mild luminal irregularities LAD and dLCx, pD1 50%, RCA normal. LVEF , LVEDP 11 mmHG, 3+ MR.  RHC: RA- 7mmHg, RV- 49/0(11), PA- 44/15(27), PCWP- 11mmHg, Ao/PA sat- 96%/67%, CO/CI- 4.28/2.45

## 2018-03-13 NOTE — DIETITIAN INITIAL EVALUATION ADULT. - NS AS NUTRI INTERV MEALS SNACK
Encourage MVI addition/General/healthful diet/Specific foods/beverages or groups/Energy - modified diet/Fluid - modified diet/Vitamin - modified diet/Protein - modified diet

## 2018-03-14 VITALS — TEMPERATURE: 97 F

## 2018-03-14 LAB
ANION GAP SERPL CALC-SCNC: 15 MMOL/L — SIGNIFICANT CHANGE UP (ref 5–17)
BUN SERPL-MCNC: 17 MG/DL — SIGNIFICANT CHANGE UP (ref 7–23)
CALCIUM SERPL-MCNC: 9.1 MG/DL — SIGNIFICANT CHANGE UP (ref 8.4–10.5)
CHLORIDE SERPL-SCNC: 104 MMOL/L — SIGNIFICANT CHANGE UP (ref 96–108)
CO2 SERPL-SCNC: 21 MMOL/L — LOW (ref 22–31)
CREAT SERPL-MCNC: 0.75 MG/DL — SIGNIFICANT CHANGE UP (ref 0.5–1.3)
GLUCOSE BLDC GLUCOMTR-MCNC: 102 MG/DL — HIGH (ref 70–99)
GLUCOSE BLDC GLUCOMTR-MCNC: 106 MG/DL — HIGH (ref 70–99)
GLUCOSE SERPL-MCNC: 119 MG/DL — HIGH (ref 70–99)
HCT VFR BLD CALC: 41.1 % — SIGNIFICANT CHANGE UP (ref 34.5–45)
HGB BLD-MCNC: 13.5 G/DL — SIGNIFICANT CHANGE UP (ref 11.5–15.5)
MAGNESIUM SERPL-MCNC: 2.1 MG/DL — SIGNIFICANT CHANGE UP (ref 1.6–2.6)
MCHC RBC-ENTMCNC: 32.8 G/DL — SIGNIFICANT CHANGE UP (ref 32–36)
MCHC RBC-ENTMCNC: 33.9 PG — SIGNIFICANT CHANGE UP (ref 27–34)
MCV RBC AUTO: 103.3 FL — HIGH (ref 80–100)
PLATELET # BLD AUTO: 171 K/UL — SIGNIFICANT CHANGE UP (ref 150–400)
POTASSIUM SERPL-MCNC: 4 MMOL/L — SIGNIFICANT CHANGE UP (ref 3.5–5.3)
POTASSIUM SERPL-SCNC: 4 MMOL/L — SIGNIFICANT CHANGE UP (ref 3.5–5.3)
RBC # BLD: 3.98 M/UL — SIGNIFICANT CHANGE UP (ref 3.8–5.2)
RBC # FLD: 13.5 % — SIGNIFICANT CHANGE UP (ref 10.3–16.9)
SODIUM SERPL-SCNC: 140 MMOL/L — SIGNIFICANT CHANGE UP (ref 135–145)
TSH SERPL-MCNC: 1.2 UIU/ML — SIGNIFICANT CHANGE UP (ref 0.35–4.94)
WBC # BLD: 2.7 K/UL — LOW (ref 3.8–10.5)
WBC # FLD AUTO: 2.7 K/UL — LOW (ref 3.8–10.5)

## 2018-03-14 PROCEDURE — 84443 ASSAY THYROID STIM HORMONE: CPT

## 2018-03-14 PROCEDURE — 85610 PROTHROMBIN TIME: CPT

## 2018-03-14 PROCEDURE — 82553 CREATINE MB FRACTION: CPT

## 2018-03-14 PROCEDURE — 99238 HOSP IP/OBS DSCHRG MGMT 30/<: CPT

## 2018-03-14 PROCEDURE — 82550 ASSAY OF CK (CPK): CPT

## 2018-03-14 PROCEDURE — 85025 COMPLETE CBC W/AUTO DIFF WBC: CPT

## 2018-03-14 PROCEDURE — 93306 TTE W/DOPPLER COMPLETE: CPT

## 2018-03-14 PROCEDURE — 83735 ASSAY OF MAGNESIUM: CPT

## 2018-03-14 PROCEDURE — C1887: CPT

## 2018-03-14 PROCEDURE — 97161 PT EVAL LOW COMPLEX 20 MIN: CPT

## 2018-03-14 PROCEDURE — 71046 X-RAY EXAM CHEST 2 VIEWS: CPT

## 2018-03-14 PROCEDURE — C1889: CPT

## 2018-03-14 PROCEDURE — 85730 THROMBOPLASTIN TIME PARTIAL: CPT

## 2018-03-14 PROCEDURE — C1769: CPT

## 2018-03-14 PROCEDURE — 99285 EMERGENCY DEPT VISIT HI MDM: CPT | Mod: 25

## 2018-03-14 PROCEDURE — 93010 ELECTROCARDIOGRAM REPORT: CPT

## 2018-03-14 PROCEDURE — C1894: CPT

## 2018-03-14 PROCEDURE — 93005 ELECTROCARDIOGRAM TRACING: CPT

## 2018-03-14 PROCEDURE — 87536 HIV-1 QUANT&REVRSE TRNSCRPJ: CPT

## 2018-03-14 PROCEDURE — 80061 LIPID PANEL: CPT

## 2018-03-14 PROCEDURE — 82962 GLUCOSE BLOOD TEST: CPT

## 2018-03-14 PROCEDURE — 71275 CT ANGIOGRAPHY CHEST: CPT

## 2018-03-14 PROCEDURE — 85027 COMPLETE CBC AUTOMATED: CPT

## 2018-03-14 PROCEDURE — 80053 COMPREHEN METABOLIC PANEL: CPT

## 2018-03-14 PROCEDURE — 71045 X-RAY EXAM CHEST 1 VIEW: CPT

## 2018-03-14 PROCEDURE — 84484 ASSAY OF TROPONIN QUANT: CPT

## 2018-03-14 PROCEDURE — 96374 THER/PROPH/DIAG INJ IV PUSH: CPT | Mod: XU

## 2018-03-14 PROCEDURE — 83036 HEMOGLOBIN GLYCOSYLATED A1C: CPT

## 2018-03-14 PROCEDURE — 86359 T CELLS TOTAL COUNT: CPT

## 2018-03-14 PROCEDURE — 93312 ECHO TRANSESOPHAGEAL: CPT

## 2018-03-14 PROCEDURE — 83880 ASSAY OF NATRIURETIC PEPTIDE: CPT

## 2018-03-14 PROCEDURE — 36415 COLL VENOUS BLD VENIPUNCTURE: CPT

## 2018-03-14 PROCEDURE — 80307 DRUG TEST PRSMV CHEM ANLYZR: CPT

## 2018-03-14 PROCEDURE — 80048 BASIC METABOLIC PNL TOTAL CA: CPT

## 2018-03-14 RX ORDER — ISOSORBIDE MONONITRATE 60 MG/1
1 TABLET, EXTENDED RELEASE ORAL
Qty: 30 | Refills: 3
Start: 2018-03-14 | End: 2018-07-11

## 2018-03-14 RX ORDER — SACUBITRIL AND VALSARTAN 24; 26 MG/1; MG/1
1 TABLET, FILM COATED ORAL
Qty: 0 | Refills: 0 | COMMUNITY

## 2018-03-14 RX ORDER — SACUBITRIL AND VALSARTAN 24; 26 MG/1; MG/1
1 TABLET, FILM COATED ORAL
Qty: 60 | Refills: 3
Start: 2018-03-14 | End: 2018-07-11

## 2018-03-14 RX ORDER — ASPIRIN/CALCIUM CARB/MAGNESIUM 324 MG
1 TABLET ORAL
Qty: 0 | Refills: 0 | COMMUNITY

## 2018-03-14 RX ORDER — ASPIRIN/CALCIUM CARB/MAGNESIUM 324 MG
1 TABLET ORAL
Qty: 30 | Refills: 11
Start: 2018-03-14 | End: 2019-03-08

## 2018-03-14 RX ORDER — METOPROLOL TARTRATE 50 MG
1 TABLET ORAL
Qty: 60 | Refills: 3
Start: 2018-03-14 | End: 2018-07-11

## 2018-03-14 RX ORDER — HYDRALAZINE HCL 50 MG
1 TABLET ORAL
Qty: 90 | Refills: 3
Start: 2018-03-14 | End: 2018-07-11

## 2018-03-14 RX ORDER — FUROSEMIDE 40 MG
1 TABLET ORAL
Qty: 60 | Refills: 2
Start: 2018-03-14 | End: 2018-06-11

## 2018-03-14 RX ADMIN — Medication 10 MILLIGRAM(S): at 06:52

## 2018-03-14 RX ADMIN — Medication 1250 MILLIGRAM(S): at 08:33

## 2018-03-14 RX ADMIN — SACUBITRIL AND VALSARTAN 1 TABLET(S): 24; 26 TABLET, FILM COATED ORAL at 06:51

## 2018-03-14 RX ADMIN — Medication 81 MILLIGRAM(S): at 12:03

## 2018-03-14 RX ADMIN — Medication 25 MILLIGRAM(S): at 12:04

## 2018-03-14 RX ADMIN — ISOSORBIDE MONONITRATE 30 MILLIGRAM(S): 60 TABLET, EXTENDED RELEASE ORAL at 12:03

## 2018-03-14 RX ADMIN — Medication 20 MILLIGRAM(S): at 06:52

## 2018-03-20 DIAGNOSIS — I42.9 CARDIOMYOPATHY, UNSPECIFIED: ICD-10-CM

## 2018-03-20 DIAGNOSIS — Z87.891 PERSONAL HISTORY OF NICOTINE DEPENDENCE: ICD-10-CM

## 2018-03-20 DIAGNOSIS — I11.0 HYPERTENSIVE HEART DISEASE WITH HEART FAILURE: ICD-10-CM

## 2018-03-20 DIAGNOSIS — I31.3 PERICARDIAL EFFUSION (NONINFLAMMATORY): ICD-10-CM

## 2018-03-20 DIAGNOSIS — F99 MENTAL DISORDER, NOT OTHERWISE SPECIFIED: ICD-10-CM

## 2018-03-20 DIAGNOSIS — Z85.3 PERSONAL HISTORY OF MALIGNANT NEOPLASM OF BREAST: ICD-10-CM

## 2018-03-20 DIAGNOSIS — F12.90 CANNABIS USE, UNSPECIFIED, UNCOMPLICATED: ICD-10-CM

## 2018-03-20 DIAGNOSIS — I50.23 ACUTE ON CHRONIC SYSTOLIC (CONGESTIVE) HEART FAILURE: ICD-10-CM

## 2018-03-20 DIAGNOSIS — Z21 ASYMPTOMATIC HUMAN IMMUNODEFICIENCY VIRUS [HIV] INFECTION STATUS: ICD-10-CM

## 2018-03-20 DIAGNOSIS — E11.9 TYPE 2 DIABETES MELLITUS WITHOUT COMPLICATIONS: ICD-10-CM

## 2018-03-20 DIAGNOSIS — J45.909 UNSPECIFIED ASTHMA, UNCOMPLICATED: ICD-10-CM

## 2018-03-20 DIAGNOSIS — I34.0 NONRHEUMATIC MITRAL (VALVE) INSUFFICIENCY: ICD-10-CM

## 2018-03-29 ENCOUNTER — APPOINTMENT (OUTPATIENT)
Dept: PLASTIC SURGERY | Facility: CLINIC | Age: 58
End: 2018-03-29
Payer: MEDICAID

## 2018-03-29 VITALS — WEIGHT: 165 LBS | BODY MASS INDEX: 28.17 KG/M2 | HEIGHT: 64 IN

## 2018-03-29 PROCEDURE — 99024 POSTOP FOLLOW-UP VISIT: CPT

## 2018-03-30 ENCOUNTER — APPOINTMENT (OUTPATIENT)
Age: 58
End: 2018-03-30
Payer: MEDICAID

## 2018-03-30 VITALS
RESPIRATION RATE: 18 BRPM | HEART RATE: 79 BPM | SYSTOLIC BLOOD PRESSURE: 121 MMHG | WEIGHT: 152.5 LBS | HEIGHT: 64 IN | DIASTOLIC BLOOD PRESSURE: 75 MMHG | BODY MASS INDEX: 26.03 KG/M2 | OXYGEN SATURATION: 99 %

## 2018-03-30 DIAGNOSIS — Z86.19 PERSONAL HISTORY OF OTHER INFECTIOUS AND PARASITIC DISEASES: ICD-10-CM

## 2018-03-30 DIAGNOSIS — Z86.79 PERSONAL HISTORY OF OTHER DISEASES OF THE CIRCULATORY SYSTEM: ICD-10-CM

## 2018-03-30 PROCEDURE — 99205 OFFICE O/P NEW HI 60 MIN: CPT | Mod: 25

## 2018-03-30 PROCEDURE — 77263 THER RADIOLOGY TX PLNG CPLX: CPT

## 2018-03-30 RX ORDER — FLUCONAZOLE 150 MG/1
150 TABLET ORAL
Qty: 1 | Refills: 0 | Status: DISCONTINUED | COMMUNITY
Start: 2017-08-28

## 2018-03-30 RX ORDER — CHLORHEXIDINE GLUCONATE, 0.12% ORAL RINSE 1.2 MG/ML
0.12 SOLUTION DENTAL
Qty: 473 | Refills: 0 | Status: DISCONTINUED | COMMUNITY
Start: 2017-10-20

## 2018-03-30 RX ORDER — ASPIRIN 81 MG
81 TABLET, DELAYED RELEASE (ENTERIC COATED) ORAL
Refills: 0 | Status: DISCONTINUED | COMMUNITY
End: 2018-03-30

## 2018-03-30 RX ORDER — VALACYCLOVIR 1 G/1
1 TABLET, FILM COATED ORAL
Qty: 30 | Refills: 0 | Status: ACTIVE | COMMUNITY
Start: 2017-10-23

## 2018-03-30 RX ORDER — NAPROXEN 375 MG/1
375 TABLET ORAL
Qty: 60 | Refills: 0 | Status: DISCONTINUED | COMMUNITY
Start: 2017-10-20

## 2018-03-30 RX ORDER — CIPROFLOXACIN HYDROCHLORIDE 500 MG/1
500 TABLET, FILM COATED ORAL
Qty: 14 | Refills: 0 | Status: DISCONTINUED | COMMUNITY
Start: 2017-09-11

## 2018-03-30 RX ORDER — RISPERIDONE 2 MG/1
2 TABLET, FILM COATED ORAL
Qty: 30 | Refills: 0 | Status: DISCONTINUED | COMMUNITY
Start: 2017-10-23

## 2018-03-30 RX ORDER — TRAMADOL HYDROCHLORIDE 50 MG/1
50 TABLET, COATED ORAL
Qty: 28 | Refills: 0 | Status: DISCONTINUED | COMMUNITY
Start: 2017-10-23

## 2018-03-30 RX ORDER — LAMIVUDINE AND ZIDOVUDINE 150; 300 MG/1; MG/1
150-300 TABLET, FILM COATED ORAL
Qty: 60 | Refills: 0 | Status: DISCONTINUED | COMMUNITY
Start: 2017-06-26

## 2018-03-30 RX ORDER — CLOTRIMAZOLE 10 MG/G
1 CREAM TOPICAL
Qty: 30 | Refills: 0 | Status: DISCONTINUED | COMMUNITY
Start: 2017-10-23

## 2018-03-30 RX ORDER — DIPHENHYDRAMINE HYDROCHLORIDE AND LIDOCAINE HYDROCHLORIDE AND ALUMINUM HYDROXIDE AND MAGNESIUM HYDRO
KIT
Qty: 237 | Refills: 0 | Status: DISCONTINUED | COMMUNITY
Start: 2017-11-24

## 2018-03-30 RX ORDER — METRONIDAZOLE 500 MG/1
500 TABLET ORAL
Qty: 14 | Refills: 0 | Status: DISCONTINUED | COMMUNITY
Start: 2017-08-28

## 2018-03-30 RX ORDER — ONDANSETRON 4 MG/1
4 TABLET ORAL
Qty: 9 | Refills: 0 | Status: DISCONTINUED | COMMUNITY
Start: 2017-11-16

## 2018-04-03 ENCOUNTER — EMERGENCY (EMERGENCY)
Facility: HOSPITAL | Age: 58
LOS: 1 days | Discharge: HOME | End: 2018-04-03
Attending: EMERGENCY MEDICINE | Admitting: EMERGENCY MEDICINE
Payer: COMMERCIAL

## 2018-04-03 VITALS
DIASTOLIC BLOOD PRESSURE: 67 MMHG | HEART RATE: 64 BPM | OXYGEN SATURATION: 100 % | TEMPERATURE: 98 F | SYSTOLIC BLOOD PRESSURE: 114 MMHG | RESPIRATION RATE: 17 BRPM

## 2018-04-03 VITALS
DIASTOLIC BLOOD PRESSURE: 65 MMHG | RESPIRATION RATE: 18 BRPM | HEART RATE: 62 BPM | SYSTOLIC BLOOD PRESSURE: 117 MMHG | OXYGEN SATURATION: 99 %

## 2018-04-03 DIAGNOSIS — Z98.890 OTHER SPECIFIED POSTPROCEDURAL STATES: Chronic | ICD-10-CM

## 2018-04-03 DIAGNOSIS — Z79.84 LONG TERM (CURRENT) USE OF ORAL HYPOGLYCEMIC DRUGS: ICD-10-CM

## 2018-04-03 DIAGNOSIS — R04.0 EPISTAXIS: ICD-10-CM

## 2018-04-03 DIAGNOSIS — Z79.82 LONG TERM (CURRENT) USE OF ASPIRIN: ICD-10-CM

## 2018-04-03 DIAGNOSIS — Z79.899 OTHER LONG TERM (CURRENT) DRUG THERAPY: ICD-10-CM

## 2018-04-03 PROCEDURE — 99282 EMERGENCY DEPT VISIT SF MDM: CPT | Mod: 25

## 2018-04-03 PROCEDURE — 99282 EMERGENCY DEPT VISIT SF MDM: CPT

## 2018-04-03 RX ORDER — PHENYLEPHRINE HCL 0.25 %
1 AEROSOL, SPRAY WITH PUMP (ML) NASAL ONCE
Qty: 0 | Refills: 0 | Status: COMPLETED | OUTPATIENT
Start: 2018-04-03 | End: 2018-04-03

## 2018-04-03 RX ADMIN — Medication 1 SPRAY(S): at 07:45

## 2018-04-03 NOTE — ED ADULT NURSE NOTE - CHPI ED SYMPTOMS NEG
no weakness/no numbness/no fever/no vomiting/no change in level of consciousness/no chills/no blurred vision/no loss of consciousness/no nausea/no syncope

## 2018-04-03 NOTE — ED PROVIDER NOTE - PHYSICAL EXAMINATION
GEN: Well appearing, well nourished, awake, alert, oriented to person, place, time/situation and in no apparent distress.  ENT: Airway patent, Nasal mucosa clear. No active bleeding, no septal hematoma, no bleeding down posterior pharynx. Mouth with normal mucosa.  EYES: Clear bilaterally.  RESPIRATORY: Breathing comfortably with normal RR.  CARDIAC: Regular rate and rhythm  ABDOMEN: Soft, nontender, +bowel sounds, no rebound, rigidity, or guarding.  MSK: Range of motion is not limited, no deformities noted.  NEURO: Alert and oriented, no focal deficits.  SKIN: Skin normal color for race, warm, dry and intact. No evidence of rash.  PSYCH: Alert and oriented to person, place, time/situation. normal mood and affect. no apparent risk to self or others.

## 2018-04-03 NOTE — ED ADULT NURSE NOTE - OBJECTIVE STATEMENT
nose bleed x 2 since Monday afternoon, on right nare, spits out blood, reports same incident before her breast surgery but only small amount

## 2018-04-03 NOTE — ED PROVIDER NOTE - OBJECTIVE STATEMENT
Pt on asa with epistaxis, now resolved 57F on asa who p/w with epistaxis this am associated with "clots in my mouth". She was tilting her head back and noticed blood in her mouth. Bleeding stopped with pressure. She denies trauma or picking but notes apartment has been dry. She has had nosebleeds in the past but this one lasted longer so she came to the ED. Bleeding resolved upon arrival.

## 2018-04-03 NOTE — ED ADULT TRIAGE NOTE - ARRIVAL INFO ADDITIONAL COMMENTS
Pt walked into ED with c/o of spitting out blood and epistaxis of the right nare passing out clots wit bright red blood. Onset was 2 hours ago. Pt states it stopped after 20 minutes. Pt states she has hx of Breast CA, lumpectomy. Denies fever, chills, CP, SOB, HA. Pt is currently taking aspirin. hx DM, HTN, breast CA, [place]. she is currently not on chemo or radation.

## 2018-04-03 NOTE — ED PROVIDER NOTE - MEDICAL DECISION MAKING DETAILS
Pt with epistaxis at home, resolved on arrival. VSS, no resp distress. Pt observed in the ED and had one episode of bleeding that resolved promptly with afrin spray and pressure. On reexm there was no rebleeding and no blood down pharynx. Pt was educated to tilt her head forward instead of backwards when applying pressure and to use vasline, afrin and saline mist spray at home. She was offered a rhinorocket but she refused. She was given names for ENT follow up and told to return to the ER for rebleeding that does not resolved with pressure.

## 2018-04-04 ENCOUNTER — EMERGENCY (EMERGENCY)
Facility: HOSPITAL | Age: 58
LOS: 1 days | Discharge: HOME | End: 2018-04-04
Admitting: EMERGENCY MEDICINE
Payer: COMMERCIAL

## 2018-04-04 VITALS
DIASTOLIC BLOOD PRESSURE: 84 MMHG | RESPIRATION RATE: 18 BRPM | WEIGHT: 154.1 LBS | SYSTOLIC BLOOD PRESSURE: 142 MMHG | TEMPERATURE: 98 F | OXYGEN SATURATION: 99 % | HEIGHT: 64 IN | HEART RATE: 95 BPM

## 2018-04-04 DIAGNOSIS — Z79.82 LONG TERM (CURRENT) USE OF ASPIRIN: ICD-10-CM

## 2018-04-04 DIAGNOSIS — Z98.890 OTHER SPECIFIED POSTPROCEDURAL STATES: Chronic | ICD-10-CM

## 2018-04-04 DIAGNOSIS — R04.0 EPISTAXIS: ICD-10-CM

## 2018-04-04 DIAGNOSIS — Z79.899 OTHER LONG TERM (CURRENT) DRUG THERAPY: ICD-10-CM

## 2018-04-04 PROCEDURE — 99283 EMERGENCY DEPT VISIT LOW MDM: CPT | Mod: 25

## 2018-04-04 PROCEDURE — 99282 EMERGENCY DEPT VISIT SF MDM: CPT

## 2018-04-04 NOTE — ED PROVIDER NOTE - OBJECTIVE STATEMENT
Intermittent epistaxis last 2 days and to ED as recommenced tonight. ASA only as AC med. no emesis 58 yo female, POOR HISTORIAN, former smoker, former drug abuser (quit 2006) with a PMHx of HIV diagnosed in 1997 (viral load unknown), schizophrenia?(was prescribed Risperdol in past but does not take), Asthma (prior ED visits/denies intubations), HTN, DMII, severe mitral valve regurgitation, cardiomyopathy (per prior H+P EF 40%, after stress test 28%: All scripts currently not working), hx of right breast cancer diagnosed in 2017, s/p chemotherapy (finished 11/2017) s/p radical mastectomy w/ drain placement x 2 and tissue expander (1/26/2018) presented to [facility] ED (3/8/2018) with the complaint of Intermittent epistaxis last 2 days and to ED as recommenced tonight. ASA only as AC med. no emesis. currently bleeding ceased

## 2018-04-04 NOTE — ED PROVIDER NOTE - MEDICAL DECISION MAKING DETAILS
local care with hemostasis achieved and ENT f/u stressed for today local care with hemostasis achieved and ENT f/u stressed for today for recurrent epistaxis

## 2018-04-04 NOTE — ED ADULT NURSE NOTE - CHPI ED SYMPTOMS NEG
no numbness/no fever/no weakness/no blurred vision/no syncope/no change in level of consciousness/no vomiting/no chills/no loss of consciousness/no nausea

## 2018-04-04 NOTE — ED ADULT NURSE NOTE - CHIEF COMPLAINT QUOTE
pt presents to ED c/o nose bleed x 1 hr. scant blood present from right nostril. on Aspirin 81mg daily. patient currently applying pressure to nose.

## 2018-04-04 NOTE — ED ADULT TRIAGE NOTE - CHIEF COMPLAINT QUOTE
pt presents to ED c/o nose bleed x 1 hr. on Aspirin 81mg daily. pt presents to ED c/o nose bleed x 1 hr. scant blood present from right nostril. on Aspirin 81mg daily. patient currently applying pressure to nose.

## 2018-04-05 ENCOUNTER — APPOINTMENT (OUTPATIENT)
Dept: OTOLARYNGOLOGY | Facility: CLINIC | Age: 58
End: 2018-04-05
Payer: MEDICAID

## 2018-04-05 VITALS
HEART RATE: 90 BPM | RESPIRATION RATE: 16 BRPM | OXYGEN SATURATION: 100 % | DIASTOLIC BLOOD PRESSURE: 73 MMHG | SYSTOLIC BLOOD PRESSURE: 168 MMHG | HEIGHT: 64 IN | WEIGHT: 154 LBS | TEMPERATURE: 98.4 F | BODY MASS INDEX: 26.29 KG/M2

## 2018-04-05 DIAGNOSIS — R04.0 EPISTAXIS: ICD-10-CM

## 2018-04-05 DIAGNOSIS — Z83.3 FAMILY HISTORY OF DIABETES MELLITUS: ICD-10-CM

## 2018-04-05 DIAGNOSIS — Z82.49 FAMILY HISTORY OF ISCHEMIC HEART DISEASE AND OTHER DISEASES OF THE CIRCULATORY SYSTEM: ICD-10-CM

## 2018-04-05 DIAGNOSIS — Z81.8 FAMILY HISTORY OF OTHER MENTAL AND BEHAVIORAL DISORDERS: ICD-10-CM

## 2018-04-05 DIAGNOSIS — Z78.9 OTHER SPECIFIED HEALTH STATUS: ICD-10-CM

## 2018-04-05 DIAGNOSIS — Z80.9 FAMILY HISTORY OF MALIGNANT NEOPLASM, UNSPECIFIED: ICD-10-CM

## 2018-04-05 DIAGNOSIS — Z85.9 PERSONAL HISTORY OF MALIGNANT NEOPLASM, UNSPECIFIED: ICD-10-CM

## 2018-04-05 PROCEDURE — 30903 CONTROL OF NOSEBLEED: CPT | Mod: RT

## 2018-04-05 PROCEDURE — 99204 OFFICE O/P NEW MOD 45 MIN: CPT | Mod: 25

## 2018-04-09 ENCOUNTER — APPOINTMENT (OUTPATIENT)
Dept: OTOLARYNGOLOGY | Facility: CLINIC | Age: 58
End: 2018-04-09
Payer: MEDICAID

## 2018-04-09 VITALS
TEMPERATURE: 98.3 F | SYSTOLIC BLOOD PRESSURE: 143 MMHG | OXYGEN SATURATION: 98 % | DIASTOLIC BLOOD PRESSURE: 80 MMHG | HEART RATE: 83 BPM

## 2018-04-09 DIAGNOSIS — R04.0 EPISTAXIS: ICD-10-CM

## 2018-04-09 PROCEDURE — 99213 OFFICE O/P EST LOW 20 MIN: CPT

## 2018-04-12 ENCOUNTER — APPOINTMENT (OUTPATIENT)
Dept: PLASTIC SURGERY | Facility: CLINIC | Age: 58
End: 2018-04-12

## 2018-04-12 ENCOUNTER — APPOINTMENT (OUTPATIENT)
Dept: PLASTIC SURGERY | Facility: CLINIC | Age: 58
End: 2018-04-12
Payer: MEDICAID

## 2018-04-12 VITALS — BODY MASS INDEX: 26.29 KG/M2 | WEIGHT: 154 LBS | HEIGHT: 64 IN

## 2018-04-12 PROCEDURE — 99024 POSTOP FOLLOW-UP VISIT: CPT

## 2018-04-17 ENCOUNTER — APPOINTMENT (OUTPATIENT)
Dept: CARDIOTHORACIC SURGERY | Facility: CLINIC | Age: 58
End: 2018-04-17
Payer: MEDICAID

## 2018-04-18 ENCOUNTER — APPOINTMENT (OUTPATIENT)
Dept: HEART AND VASCULAR | Facility: CLINIC | Age: 58
End: 2018-04-18

## 2018-04-24 ENCOUNTER — APPOINTMENT (OUTPATIENT)
Dept: CARDIOTHORACIC SURGERY | Facility: CLINIC | Age: 58
End: 2018-04-24
Payer: MEDICAID

## 2018-04-24 VITALS
TEMPERATURE: 97.6 F | SYSTOLIC BLOOD PRESSURE: 134 MMHG | HEART RATE: 74 BPM | OXYGEN SATURATION: 99 % | BODY MASS INDEX: 25.83 KG/M2 | HEIGHT: 65 IN | DIASTOLIC BLOOD PRESSURE: 74 MMHG | RESPIRATION RATE: 18 BRPM | WEIGHT: 155 LBS

## 2018-04-24 PROCEDURE — 99205 OFFICE O/P NEW HI 60 MIN: CPT

## 2018-04-25 ENCOUNTER — APPOINTMENT (OUTPATIENT)
Dept: HEART AND VASCULAR | Facility: CLINIC | Age: 58
End: 2018-04-25
Payer: MEDICAID

## 2018-04-25 VITALS
WEIGHT: 156.99 LBS | HEART RATE: 83 BPM | TEMPERATURE: 97.8 F | DIASTOLIC BLOOD PRESSURE: 78 MMHG | OXYGEN SATURATION: 99 % | HEIGHT: 64 IN | BODY MASS INDEX: 26.8 KG/M2 | SYSTOLIC BLOOD PRESSURE: 120 MMHG

## 2018-04-25 PROCEDURE — 93000 ELECTROCARDIOGRAM COMPLETE: CPT

## 2018-04-25 PROCEDURE — 99214 OFFICE O/P EST MOD 30 MIN: CPT

## 2018-04-26 ENCOUNTER — APPOINTMENT (OUTPATIENT)
Dept: PLASTIC SURGERY | Facility: CLINIC | Age: 58
End: 2018-04-26

## 2018-04-27 ENCOUNTER — APPOINTMENT (OUTPATIENT)
Dept: BREAST CENTER | Facility: CLINIC | Age: 58
End: 2018-04-27

## 2018-05-03 ENCOUNTER — APPOINTMENT (OUTPATIENT)
Dept: PLASTIC SURGERY | Facility: CLINIC | Age: 58
End: 2018-05-03
Payer: MEDICAID

## 2018-05-03 VITALS — WEIGHT: 156 LBS | BODY MASS INDEX: 26.63 KG/M2 | HEIGHT: 64 IN

## 2018-05-03 PROCEDURE — 99212 OFFICE O/P EST SF 10 MIN: CPT

## 2018-05-10 ENCOUNTER — APPOINTMENT (OUTPATIENT)
Dept: HEART AND VASCULAR | Facility: CLINIC | Age: 58
End: 2018-05-10
Payer: MEDICAID

## 2018-05-10 VITALS
WEIGHT: 156 LBS | SYSTOLIC BLOOD PRESSURE: 139 MMHG | DIASTOLIC BLOOD PRESSURE: 60 MMHG | HEART RATE: 71 BPM | HEIGHT: 64 IN | BODY MASS INDEX: 26.63 KG/M2

## 2018-05-10 PROCEDURE — 99204 OFFICE O/P NEW MOD 45 MIN: CPT | Mod: 25

## 2018-05-10 PROCEDURE — 93000 ELECTROCARDIOGRAM COMPLETE: CPT

## 2018-05-11 RX ORDER — ONDANSETRON 8 MG/1
16 TABLET, FILM COATED ORAL ONCE
Qty: 0 | Refills: 0 | Status: DISCONTINUED | OUTPATIENT
Start: 2018-05-15 | End: 2018-05-30

## 2018-05-11 RX ORDER — PEGFILGRASTIM-CBQV 6 MG/.6ML
6 INJECTION, SOLUTION SUBCUTANEOUS ONCE
Qty: 0 | Refills: 0 | Status: DISCONTINUED | OUTPATIENT
Start: 2018-05-15 | End: 2018-05-30

## 2018-05-11 RX ORDER — PACLITAXEL 6 MG/ML
315 INJECTION, SOLUTION, CONCENTRATE INTRAVENOUS ONCE
Qty: 0 | Refills: 0 | Status: DISCONTINUED | OUTPATIENT
Start: 2018-05-15 | End: 2018-05-30

## 2018-05-11 RX ORDER — DEXAMETHASONE 0.5 MG/5ML
20 ELIXIR ORAL ONCE
Qty: 0 | Refills: 0 | Status: DISCONTINUED | OUTPATIENT
Start: 2018-05-15 | End: 2018-05-30

## 2018-05-15 ENCOUNTER — APPOINTMENT (OUTPATIENT)
Dept: INFUSION THERAPY | Facility: HOSPITAL | Age: 58
End: 2018-05-15

## 2018-05-15 ENCOUNTER — OUTPATIENT (OUTPATIENT)
Dept: OUTPATIENT SERVICES | Facility: HOSPITAL | Age: 58
LOS: 1 days | End: 2018-05-15
Payer: COMMERCIAL

## 2018-05-15 DIAGNOSIS — Z98.890 OTHER SPECIFIED POSTPROCEDURAL STATES: Chronic | ICD-10-CM

## 2018-05-15 DIAGNOSIS — C50.919 MALIGNANT NEOPLASM OF UNSPECIFIED SITE OF UNSPECIFIED FEMALE BREAST: ICD-10-CM

## 2018-05-15 PROCEDURE — 96413 CHEMO IV INFUSION 1 HR: CPT

## 2018-05-15 PROCEDURE — 96372 THER/PROPH/DIAG INJ SC/IM: CPT

## 2018-05-15 PROCEDURE — 96377 APPLICATON ON-BODY INJECTOR: CPT

## 2018-05-15 PROCEDURE — 96375 TX/PRO/DX INJ NEW DRUG ADDON: CPT

## 2018-05-15 PROCEDURE — 96415 CHEMO IV INFUSION ADDL HR: CPT

## 2018-05-23 DIAGNOSIS — Z51.11 ENCOUNTER FOR ANTINEOPLASTIC CHEMOTHERAPY: ICD-10-CM

## 2018-05-23 DIAGNOSIS — R11.2 NAUSEA WITH VOMITING, UNSPECIFIED: ICD-10-CM

## 2018-06-01 ENCOUNTER — APPOINTMENT (OUTPATIENT)
Dept: INFUSION THERAPY | Facility: HOSPITAL | Age: 58
End: 2018-06-01

## 2018-06-27 RX ORDER — PACLITAXEL 6 MG/ML
238 INJECTION, SOLUTION, CONCENTRATE INTRAVENOUS ONCE
Qty: 0 | Refills: 0 | Status: COMPLETED | OUTPATIENT
Start: 2018-07-03 | End: 2018-07-03

## 2018-06-27 RX ORDER — PEGFILGRASTIM-CBQV 6 MG/.6ML
6 INJECTION, SOLUTION SUBCUTANEOUS ONCE
Qty: 0 | Refills: 0 | Status: DISCONTINUED | OUTPATIENT
Start: 2018-07-03 | End: 2018-07-18

## 2018-07-03 ENCOUNTER — OUTPATIENT (OUTPATIENT)
Dept: OUTPATIENT SERVICES | Facility: HOSPITAL | Age: 58
LOS: 1 days | End: 2018-07-03
Payer: COMMERCIAL

## 2018-07-03 ENCOUNTER — APPOINTMENT (OUTPATIENT)
Dept: INFUSION THERAPY | Facility: HOSPITAL | Age: 58
End: 2018-07-03

## 2018-07-03 VITALS
SYSTOLIC BLOOD PRESSURE: 108 MMHG | OXYGEN SATURATION: 99 % | HEART RATE: 80 BPM | DIASTOLIC BLOOD PRESSURE: 68 MMHG | TEMPERATURE: 97 F | RESPIRATION RATE: 18 BRPM

## 2018-07-03 DIAGNOSIS — Z98.890 OTHER SPECIFIED POSTPROCEDURAL STATES: Chronic | ICD-10-CM

## 2018-07-03 DIAGNOSIS — C50.919 MALIGNANT NEOPLASM OF UNSPECIFIED SITE OF UNSPECIFIED FEMALE BREAST: ICD-10-CM

## 2018-07-03 PROCEDURE — 96415 CHEMO IV INFUSION ADDL HR: CPT

## 2018-07-03 PROCEDURE — 96375 TX/PRO/DX INJ NEW DRUG ADDON: CPT

## 2018-07-03 PROCEDURE — 96413 CHEMO IV INFUSION 1 HR: CPT

## 2018-07-03 PROCEDURE — 36415 COLL VENOUS BLD VENIPUNCTURE: CPT

## 2018-07-03 RX ORDER — DEXAMETHASONE 0.5 MG/5ML
20 ELIXIR ORAL ONCE
Qty: 0 | Refills: 0 | Status: COMPLETED | OUTPATIENT
Start: 2018-07-03 | End: 2018-07-03

## 2018-07-03 RX ORDER — ONDANSETRON 8 MG/1
16 TABLET, FILM COATED ORAL ONCE
Qty: 0 | Refills: 0 | Status: COMPLETED | OUTPATIENT
Start: 2018-07-03 | End: 2018-07-03

## 2018-07-03 RX ADMIN — Medication 220 MILLIGRAM(S): at 09:23

## 2018-07-03 RX ADMIN — ONDANSETRON 232 MILLIGRAM(S): 8 TABLET, FILM COATED ORAL at 09:23

## 2018-07-03 RX ADMIN — PACLITAXEL 179.89 MILLIGRAM(S): 6 INJECTION, SOLUTION, CONCENTRATE INTRAVENOUS at 09:57

## 2018-07-19 ENCOUNTER — RX RENEWAL (OUTPATIENT)
Age: 58
End: 2018-07-19

## 2018-07-26 ENCOUNTER — INBOUND DOCUMENT (OUTPATIENT)
Age: 58
End: 2018-07-26

## 2018-07-30 ENCOUNTER — APPOINTMENT (OUTPATIENT)
Dept: HEART AND VASCULAR | Facility: CLINIC | Age: 58
End: 2018-07-30

## 2018-08-08 ENCOUNTER — APPOINTMENT (OUTPATIENT)
Dept: HEART AND VASCULAR | Facility: CLINIC | Age: 58
End: 2018-08-08

## 2018-08-14 PROBLEM — I10 ESSENTIAL (PRIMARY) HYPERTENSION: Chronic | Status: ACTIVE | Noted: 2017-11-22

## 2018-08-14 PROBLEM — E11.9 TYPE 2 DIABETES MELLITUS WITHOUT COMPLICATIONS: Chronic | Status: ACTIVE | Noted: 2017-11-22

## 2018-08-14 PROBLEM — B20 HUMAN IMMUNODEFICIENCY VIRUS [HIV] DISEASE: Chronic | Status: ACTIVE | Noted: 2017-11-22

## 2018-08-20 ENCOUNTER — APPOINTMENT (OUTPATIENT)
Dept: HEART AND VASCULAR | Facility: CLINIC | Age: 58
End: 2018-08-20

## 2018-08-27 ENCOUNTER — APPOINTMENT (OUTPATIENT)
Dept: HEART AND VASCULAR | Facility: CLINIC | Age: 58
End: 2018-08-27

## 2018-08-28 ENCOUNTER — APPOINTMENT (OUTPATIENT)
Age: 58
End: 2018-08-28

## 2018-08-31 ENCOUNTER — APPOINTMENT (OUTPATIENT)
Age: 58
End: 2018-08-31

## 2018-09-12 ENCOUNTER — APPOINTMENT (OUTPATIENT)
Dept: HEART AND VASCULAR | Facility: CLINIC | Age: 58
End: 2018-09-12

## 2018-09-20 ENCOUNTER — APPOINTMENT (OUTPATIENT)
Dept: HEART AND VASCULAR | Facility: CLINIC | Age: 58
End: 2018-09-20

## 2018-10-08 ENCOUNTER — APPOINTMENT (OUTPATIENT)
Dept: HEART AND VASCULAR | Facility: CLINIC | Age: 58
End: 2018-10-08

## 2018-10-15 NOTE — ED ADULT TRIAGE NOTE - CADM TRG TX PRIOR TO ARRIVAL
none

## 2018-10-17 ENCOUNTER — APPOINTMENT (OUTPATIENT)
Dept: HEART AND VASCULAR | Facility: CLINIC | Age: 58
End: 2018-10-17

## 2018-10-18 ENCOUNTER — APPOINTMENT (OUTPATIENT)
Dept: PLASTIC SURGERY | Facility: CLINIC | Age: 58
End: 2018-10-18

## 2018-11-01 ENCOUNTER — APPOINTMENT (OUTPATIENT)
Dept: HEART AND VASCULAR | Facility: CLINIC | Age: 58
End: 2018-11-01

## 2018-11-02 ENCOUNTER — APPOINTMENT (OUTPATIENT)
Dept: RADIATION ONCOLOGY | Facility: CLINIC | Age: 58
End: 2018-11-02

## 2018-11-08 ENCOUNTER — APPOINTMENT (OUTPATIENT)
Dept: PLASTIC SURGERY | Facility: CLINIC | Age: 58
End: 2018-11-08

## 2018-11-26 ENCOUNTER — LABORATORY RESULT (OUTPATIENT)
Age: 58
End: 2018-11-26

## 2018-11-26 ENCOUNTER — APPOINTMENT (OUTPATIENT)
Dept: HEART AND VASCULAR | Facility: CLINIC | Age: 58
End: 2018-11-26
Payer: MEDICAID

## 2018-11-26 ENCOUNTER — EMERGENCY (EMERGENCY)
Facility: HOSPITAL | Age: 58
LOS: 1 days | Discharge: HOME | End: 2018-11-26
Attending: EMERGENCY MEDICINE | Admitting: EMERGENCY MEDICINE
Payer: COMMERCIAL

## 2018-11-26 VITALS
TEMPERATURE: 97.8 F | DIASTOLIC BLOOD PRESSURE: 78 MMHG | SYSTOLIC BLOOD PRESSURE: 128 MMHG | OXYGEN SATURATION: 99 % | HEART RATE: 90 BPM | BODY MASS INDEX: 27.8 KG/M2 | WEIGHT: 173 LBS | HEIGHT: 66.14 IN

## 2018-11-26 VITALS
SYSTOLIC BLOOD PRESSURE: 146 MMHG | OXYGEN SATURATION: 100 % | DIASTOLIC BLOOD PRESSURE: 71 MMHG | HEART RATE: 77 BPM | WEIGHT: 177.03 LBS | RESPIRATION RATE: 17 BRPM | TEMPERATURE: 98 F

## 2018-11-26 VITALS — SYSTOLIC BLOOD PRESSURE: 144 MMHG | RESPIRATION RATE: 16 BRPM | DIASTOLIC BLOOD PRESSURE: 88 MMHG | TEMPERATURE: 98 F

## 2018-11-26 DIAGNOSIS — E78.5 HYPERLIPIDEMIA, UNSPECIFIED: ICD-10-CM

## 2018-11-26 DIAGNOSIS — Z78.9 OTHER SPECIFIED HEALTH STATUS: ICD-10-CM

## 2018-11-26 DIAGNOSIS — I10 ESSENTIAL (PRIMARY) HYPERTENSION: ICD-10-CM

## 2018-11-26 DIAGNOSIS — N39.0 URINARY TRACT INFECTION, SITE NOT SPECIFIED: ICD-10-CM

## 2018-11-26 DIAGNOSIS — Z79.899 OTHER LONG TERM (CURRENT) DRUG THERAPY: ICD-10-CM

## 2018-11-26 DIAGNOSIS — Z79.82 LONG TERM (CURRENT) USE OF ASPIRIN: ICD-10-CM

## 2018-11-26 DIAGNOSIS — R06.00 DYSPNEA, UNSPECIFIED: ICD-10-CM

## 2018-11-26 DIAGNOSIS — R07.89 OTHER CHEST PAIN: ICD-10-CM

## 2018-11-26 DIAGNOSIS — Z98.890 OTHER SPECIFIED POSTPROCEDURAL STATES: Chronic | ICD-10-CM

## 2018-11-26 DIAGNOSIS — B20 HUMAN IMMUNODEFICIENCY VIRUS [HIV] DISEASE: ICD-10-CM

## 2018-11-26 DIAGNOSIS — M25.512 PAIN IN LEFT SHOULDER: ICD-10-CM

## 2018-11-26 LAB
ALBUMIN SERPL ELPH-MCNC: 4.2 G/DL — SIGNIFICANT CHANGE UP (ref 3.3–5)
ALP SERPL-CCNC: 56 U/L — SIGNIFICANT CHANGE UP (ref 40–120)
ALT FLD-CCNC: 53 U/L — HIGH (ref 10–45)
ANION GAP SERPL CALC-SCNC: 17 MMOL/L — SIGNIFICANT CHANGE UP (ref 5–17)
APPEARANCE UR: CLEAR — SIGNIFICANT CHANGE UP
APTT BLD: 33 SEC — SIGNIFICANT CHANGE UP (ref 27.5–36.3)
AST SERPL-CCNC: 54 U/L — HIGH (ref 10–40)
BACTERIA # UR AUTO: ABNORMAL /HPF
BASOPHILS NFR BLD AUTO: 0.4 % — SIGNIFICANT CHANGE UP (ref 0–2)
BILIRUB SERPL-MCNC: 0.7 MG/DL — SIGNIFICANT CHANGE UP (ref 0.2–1.2)
BILIRUB UR-MCNC: NEGATIVE — SIGNIFICANT CHANGE UP
BUN SERPL-MCNC: 14 MG/DL — SIGNIFICANT CHANGE UP (ref 7–23)
CALCIUM SERPL-MCNC: 9.7 MG/DL — SIGNIFICANT CHANGE UP (ref 8.4–10.5)
CHLORIDE SERPL-SCNC: 101 MMOL/L — SIGNIFICANT CHANGE UP (ref 96–108)
CK MB CFR SERPL CALC: 1.6 NG/ML — SIGNIFICANT CHANGE UP (ref 0–6.7)
CK SERPL-CCNC: 97 U/L — SIGNIFICANT CHANGE UP (ref 25–170)
CO2 SERPL-SCNC: 23 MMOL/L — SIGNIFICANT CHANGE UP (ref 22–31)
COLOR SPEC: YELLOW — SIGNIFICANT CHANGE UP
CREAT SERPL-MCNC: 0.79 MG/DL — SIGNIFICANT CHANGE UP (ref 0.5–1.3)
DIFF PNL FLD: NEGATIVE — SIGNIFICANT CHANGE UP
EOSINOPHIL NFR BLD AUTO: 1.2 % — SIGNIFICANT CHANGE UP (ref 0–6)
EPI CELLS # UR: ABNORMAL /HPF (ref 0–5)
GLUCOSE SERPL-MCNC: 108 MG/DL — HIGH (ref 70–99)
GLUCOSE UR QL: NEGATIVE — SIGNIFICANT CHANGE UP
HCT VFR BLD CALC: 43.8 % — SIGNIFICANT CHANGE UP (ref 34.5–45)
HGB BLD-MCNC: 15.1 G/DL — SIGNIFICANT CHANGE UP (ref 11.5–15.5)
INR BLD: 1.03 — SIGNIFICANT CHANGE UP (ref 0.88–1.16)
KETONES UR-MCNC: ABNORMAL MG/DL
LEUKOCYTE ESTERASE UR-ACNC: ABNORMAL
LYMPHOCYTES # BLD AUTO: 60 % — HIGH (ref 13–44)
MCHC RBC-ENTMCNC: 34.5 G/DL — SIGNIFICANT CHANGE UP (ref 32–36)
MCHC RBC-ENTMCNC: 34.6 PG — HIGH (ref 27–34)
MCV RBC AUTO: 100.5 FL — HIGH (ref 80–100)
MONOCYTES NFR BLD AUTO: 6.4 % — SIGNIFICANT CHANGE UP (ref 2–14)
NEUTROPHILS NFR BLD AUTO: 32 % — LOW (ref 43–77)
NITRITE UR-MCNC: NEGATIVE — SIGNIFICANT CHANGE UP
PH UR: 6 — SIGNIFICANT CHANGE UP (ref 5–8)
PLATELET # BLD AUTO: 247 K/UL — SIGNIFICANT CHANGE UP (ref 150–400)
POTASSIUM SERPL-MCNC: 3.6 MMOL/L — SIGNIFICANT CHANGE UP (ref 3.5–5.3)
POTASSIUM SERPL-SCNC: 3.6 MMOL/L — SIGNIFICANT CHANGE UP (ref 3.5–5.3)
PROT SERPL-MCNC: 8.7 G/DL — HIGH (ref 6–8.3)
PROT UR-MCNC: ABNORMAL MG/DL
PROTHROM AB SERPL-ACNC: 11.6 SEC — SIGNIFICANT CHANGE UP (ref 10–12.9)
RBC # BLD: 4.36 M/UL — SIGNIFICANT CHANGE UP (ref 3.8–5.2)
RBC # FLD: 13.8 % — SIGNIFICANT CHANGE UP (ref 10.3–16.9)
RBC CASTS # UR COMP ASSIST: < 5 /HPF — SIGNIFICANT CHANGE UP
SODIUM SERPL-SCNC: 141 MMOL/L — SIGNIFICANT CHANGE UP (ref 135–145)
SP GR SPEC: 1.02 — SIGNIFICANT CHANGE UP (ref 1–1.03)
TROPONIN T SERPL-MCNC: <0.01 NG/ML — SIGNIFICANT CHANGE UP (ref 0–0.01)
UROBILINOGEN FLD QL: 2 E.U./DL
WBC # BLD: 4.8 K/UL — SIGNIFICANT CHANGE UP (ref 3.8–10.5)
WBC # FLD AUTO: 4.8 K/UL — SIGNIFICANT CHANGE UP (ref 3.8–10.5)
WBC UR QL: ABNORMAL /HPF

## 2018-11-26 PROCEDURE — 87086 URINE CULTURE/COLONY COUNT: CPT

## 2018-11-26 PROCEDURE — 93005 ELECTROCARDIOGRAM TRACING: CPT

## 2018-11-26 PROCEDURE — 81001 URINALYSIS AUTO W/SCOPE: CPT

## 2018-11-26 PROCEDURE — 85025 COMPLETE CBC W/AUTO DIFF WBC: CPT

## 2018-11-26 PROCEDURE — 93000 ELECTROCARDIOGRAM COMPLETE: CPT

## 2018-11-26 PROCEDURE — 71046 X-RAY EXAM CHEST 2 VIEWS: CPT | Mod: 26

## 2018-11-26 PROCEDURE — 71275 CT ANGIOGRAPHY CHEST: CPT

## 2018-11-26 PROCEDURE — 99215 OFFICE O/P EST HI 40 MIN: CPT

## 2018-11-26 PROCEDURE — 80053 COMPREHEN METABOLIC PANEL: CPT

## 2018-11-26 PROCEDURE — 84484 ASSAY OF TROPONIN QUANT: CPT

## 2018-11-26 PROCEDURE — 71275 CT ANGIOGRAPHY CHEST: CPT | Mod: 26

## 2018-11-26 PROCEDURE — 83880 ASSAY OF NATRIURETIC PEPTIDE: CPT

## 2018-11-26 PROCEDURE — 82550 ASSAY OF CK (CPK): CPT

## 2018-11-26 PROCEDURE — 85610 PROTHROMBIN TIME: CPT

## 2018-11-26 PROCEDURE — 82553 CREATINE MB FRACTION: CPT

## 2018-11-26 PROCEDURE — 99284 EMERGENCY DEPT VISIT MOD MDM: CPT | Mod: 25

## 2018-11-26 PROCEDURE — 96374 THER/PROPH/DIAG INJ IV PUSH: CPT

## 2018-11-26 PROCEDURE — 71046 X-RAY EXAM CHEST 2 VIEWS: CPT

## 2018-11-26 PROCEDURE — 85730 THROMBOPLASTIN TIME PARTIAL: CPT

## 2018-11-26 PROCEDURE — 99285 EMERGENCY DEPT VISIT HI MDM: CPT

## 2018-11-26 RX ORDER — CEFTRIAXONE 500 MG/1
1 INJECTION, POWDER, FOR SOLUTION INTRAMUSCULAR; INTRAVENOUS ONCE
Qty: 0 | Refills: 0 | Status: COMPLETED | OUTPATIENT
Start: 2018-11-26 | End: 2018-11-26

## 2018-11-26 RX ORDER — ACETAMINOPHEN 500 MG
650 TABLET ORAL ONCE
Qty: 0 | Refills: 0 | Status: COMPLETED | OUTPATIENT
Start: 2018-11-26 | End: 2018-11-26

## 2018-11-26 RX ORDER — ASPIRIN/CALCIUM CARB/MAGNESIUM 324 MG
325 TABLET ORAL ONCE
Qty: 0 | Refills: 0 | Status: COMPLETED | OUTPATIENT
Start: 2018-11-26 | End: 2018-11-26

## 2018-11-26 RX ORDER — CEFPODOXIME PROXETIL 100 MG
1 TABLET ORAL
Qty: 20 | Refills: 0
Start: 2018-11-26 | End: 2018-12-05

## 2018-11-26 RX ADMIN — CEFTRIAXONE 100 GRAM(S): 500 INJECTION, POWDER, FOR SOLUTION INTRAMUSCULAR; INTRAVENOUS at 14:50

## 2018-11-26 RX ADMIN — Medication 650 MILLIGRAM(S): at 13:39

## 2018-11-26 RX ADMIN — Medication 325 MILLIGRAM(S): at 13:39

## 2018-11-26 RX ADMIN — Medication 650 MILLIGRAM(S): at 14:02

## 2018-11-26 RX ADMIN — CEFTRIAXONE 1 GRAM(S): 500 INJECTION, POWDER, FOR SOLUTION INTRAMUSCULAR; INTRAVENOUS at 15:20

## 2018-11-26 NOTE — ED PROVIDER NOTE - MEDICAL DECISION MAKING DETAILS
56 yo female, former smoker, former drug abuser (quit 2006) with a PMHx of HIV diagnosed in 1997 (CD4 800, VL detectable), schizophrenia?,  Asthma, HTN, DMII,  severe mitral valve regurgitation,  cardiomyopathy, hx of right breast cancer s/p R mastectomy/reconsturction and chemo presents with multiple complaints. C/o L shoulder and CP, DUNN, nausea and urinary complaints. No f/c. No abd pain. VSS. Well appearing. +tenderness to L upper chest wall and shoulder, FROM. Lungs cta b/l. Abd soft, nt, nd. CP; r/o PE, r/o pna r/o ACS. Dysuria r/o UTI. sore throat, feeling hot, diarrhea likely viral illness 58 yo female, former smoker, former drug abuser (quit 2006) with a PMHx of HIV diagnosed in 1997 (CD4 800, VL detectable), schizophrenia?,  Asthma, HTN, DMII,  severe mitral valve regurgitation,  cardiomyopathy, hx of right breast cancer s/p R mastectomy/reconsturction and chemo presents with multiple complaints. C/o L shoulder and CP, DUNN, nausea and urinary complaints. No f/c. No abd pain. VSS. Well appearing. +tenderness to L upper chest wall and shoulder, FROM. Lungs cta b/l. Abd soft, nt, nd. CP/shoulder pain, reproducible- r/o PE, r/o pna r/o ACS, ?MSK pain; Dysuria- r/o UTI; sore throat, feeling hot, diarrhea likely viral illness

## 2018-11-26 NOTE — ED PROVIDER NOTE - PHYSICAL EXAMINATION
CONSTITUTIONAL: Well-appearing; well-nourished; in no apparent distress.   HEAD: Normocephalic; atraumatic.   EYES: PERRL; EOM intact; conjunctiva and sclera clear  ENT: normal nose; no rhinorrhea; normal pharynx with no erythema or lesions.   NECK: Supple; non-tender; no LAD  CARDIOVASCULAR: Normal S1, S2; no murmurs, rubs, or gallops. Regular rate and rhythm.   RESPIRATORY: Breathing easily; breath sounds clear and equal bilaterally; no wheezes, rhonchi, or rales.  GI: Soft; non-distended; non-tender; no palpable organomegaly. +BS throughout   MSK: FROM at all extremities, normal tone; +tenderness to L anterior shoulder and L upper chest wall. FROM   EXT: No cyanosis or edema; N/V intact  SKIN: Normal for age and race; warm; dry; good turgor; no apparent lesions or rash.   NEURO: A & O x 3; face symmetric; grossly unremarkable.   PSYCHOLOGICAL: The patient’s mood and manner are appropriate.

## 2018-11-26 NOTE — ED ADULT NURSE REASSESSMENT NOTE - NS ED NURSE REASSESS COMMENT FT1
Pt has one hive to left cheekbone, also c/o itching.  Refusing Benadryl at this time.  Will continue to monitor, breathing non-labored.

## 2018-11-26 NOTE — HISTORY OF PRESENT ILLNESS
[FreeTextEntry1] : 58 yo female, POOR HISTORIAN, former smoker, former drug abuser (quit 2006) with a PMHx of HIV diagnosed in 1997 (viral load unknown), schizophrenia?(was prescribed Risperdol in past but does not take), Asthma (prior ED visits/denies intubations), HTN, DMII, MR, cardiomyopathy, right breast cancer diagnosed in 2017, s/p chemotherapy (finished 11/2017) and s/p radical mastectomy w/ tissue expander (1/26/2018). On 3/8/18 she presented to [facility] ED from Dr. Costa's office's with complaint of progressively worsening SOB. Pt reports since she started chemotherapy last year she had experienced progressively worsening SOB. . In the ER, CTA PE protocol negative, Troponin 0.03, BNP 7,293, xray w/ Mild to moderate bilateral pleural effusions, small pericardial effusion, cardiomegaly, and increased right heart pressure, which may indicate a component of CHF. Patient was admitted to [facility] for acute on chronic systolic CHF exacerbation. TTE on 3/12/18 revealed moderate to severe mitral regurgitation at a blood pressure of 120/69 mmHg, Likely mechanism appears functional with posterior leaflet restriction, small circumferential pericardial effusion with a larger collection adjacent to the right atrium, no chamber collapse seen, when compared to prior study performed on 3/8/18, there is no significant change in mitral regurgitation. 3/12/18 left and right heart cath revealed non obstructive CAD, LVEF 30-35%, LVEDP 11, 3+. JACY on 3/13 revealed moderate concentric LVH, severe global hypokinesis of LV, LVEF 20-25%, moderate MR, moderate PI, pulmonary HTN with PASP 47, small pericardial effusion. She was discharged home on medical management on 3/13/18. \par She presents today for surgical consult with Dr. John. She appears generally well, denies c/o chest pain, fever, lower extremity edema or palpitations. She reports mild DUNN and prn palpitations. \par  \par 11/26/18 no follow up since spring with ep or cardio.\par 100% compliant with meds\par no dunn edema\par ekg unchanged\par c/o arthritic left shoulder pain +ttp

## 2018-11-26 NOTE — ED PROVIDER NOTE - PROGRESS NOTE DETAILS
trop neg, CT neg for PE, 4mm pulmonary nodule at L apex. Pt made aware of findings advised to f/u with her doctor. Abx given for UTI

## 2018-11-26 NOTE — ED ADULT TRIAGE NOTE - CHIEF COMPLAINT QUOTE
c/o midsternal chest pain w/ left arm numbness that started 2 days ago.  Also c/o left shoulder pain, and lower abdominal pain since yesterday.

## 2018-11-26 NOTE — PHYSICAL EXAM
[General Appearance - Well Developed] : well developed [Normal Appearance] : normal appearance [Well Groomed] : well groomed [General Appearance - Well Nourished] : well nourished [No Deformities] : no deformities [General Appearance - In No Acute Distress] : no acute distress [Normal Conjunctiva] : the conjunctiva exhibited no abnormalities [Eyelids - No Xanthelasma] : the eyelids demonstrated no xanthelasmas [Normal Oral Mucosa] : normal oral mucosa [No Oral Pallor] : no oral pallor [No Oral Cyanosis] : no oral cyanosis [Normal Jugular Venous A Waves Present] : normal jugular venous A waves present [Normal Jugular Venous V Waves Present] : normal jugular venous V waves present [No Jugular Venous Han A Waves] : no jugular venous han A waves [Respiration, Rhythm And Depth] : normal respiratory rhythm and effort [Exaggerated Use Of Accessory Muscles For Inspiration] : no accessory muscle use [Auscultation Breath Sounds / Voice Sounds] : lungs were clear to auscultation bilaterally [Heart Rate And Rhythm] : heart rate and rhythm were normal [Heart Sounds] : normal S1 and S2 [Murmurs] : no murmurs present [Abdomen Soft] : soft [Abdomen Tenderness] : non-tender [Abdomen Mass (___ Cm)] : no abdominal mass palpated [Abnormal Walk] : normal gait [Gait - Sufficient For Exercise Testing] : the gait was sufficient for exercise testing [Nail Clubbing] : no clubbing of the fingernails [Cyanosis, Localized] : no localized cyanosis [Petechial Hemorrhages (___cm)] : no petechial hemorrhages [Skin Color & Pigmentation] : normal skin color and pigmentation [] : no rash [No Venous Stasis] : no venous stasis [Skin Lesions] : no skin lesions [No Skin Ulcers] : no skin ulcer [No Xanthoma] : no  xanthoma was observed [Oriented To Time, Place, And Person] : oriented to person, place, and time [Affect] : the affect was normal [Mood] : the mood was normal [No Anxiety] : not feeling anxious

## 2018-11-26 NOTE — ED PROVIDER NOTE - OBJECTIVE STATEMENT
58 yo female, former smoker, former drug abuser (quit 2006) with a PMHx of HIV diagnosed in 1997 (CD4 800, VL detectable), schizophrenia?,  Asthma, HTN, DMII,  severe mitral valve regurgitation,  cardiomyopathy, hx of right breast cancer diagnosed in 2017, s/p chemotherapy (finished 11/2017) s/p radical mastectomy w/ drain placement x 2 and tissue expander (1/26/2018) c/o L upper chest pain/ shoulder pain x 2 days. Pt admitted in March for SOB at that time had a CT which showed mild to mod b/l pleural effusions and a small pericardial effusion. TTE revealed mild mitral valve thickening, moderate to severe mitral regurgitation. Pt had a LHC/RHC on 3/12/18 which revealed mild luminal irregularities, no stents placed. Today pt has multiple complaints. C/o pain in the left shoulder down the arm starting 2 days ago followed by a pulling chest pain. Pain is intermittent, worse with movements of the shoulder. Denies trauma. Also c/o DUNN x 2 weeks. Reports ET 1 block. Denies LE swelling, PNA, orthopnea. Pt also c/o dysuria x 2 days associated with urinary frequency that feels like a UTI. Admits to feeling hot and nausea with watery nonbloody diarrhea that started today. Denies fever, vomiting, palpitations, sweats, dizziness, abd pain, back pain, hematuria, bodyaches, sick contacts or recent travel.

## 2018-11-26 NOTE — DISCUSSION/SUMMARY
[FreeTextEntry1] : The number of diagnostic and/or management options \par \par Systolic HF - will refer to adv chf, to optimize GDMT currently well compensated\par repeat echo\par stop lasix for now\par \par MR - medical mgmt - shelly lrepeat echo and f/u EP for possible ICD placement\par \par full labs today\par \par Labs, radiology: ekg\par \par Discussion of the case with another healthcare provider: asim croft\par \par Overall Risk: High\par

## 2018-11-26 NOTE — ED PROVIDER NOTE - ATTENDING CONTRIBUTION TO CARE
56 yo female, former smoker, former drug abuser (quit 2006) with a PMHx of HIV diagnosed in 1997 (CD4 800, VL detectable), schizophrenia?,  Asthma, HTN, DMII,  severe mitral valve regurgitation,  cardiomyopathy, hx of right breast cancer diagnosed in 2017, s/p chemotherapy (finished 11/2017) s/p radical mastectomy w/ drain placement x 2 and tissue expander (1/26/2018) c/o L upper chest pain/ shoulder pain x 2 days. Pain is intermittent, worse with movements of the shoulder. Denies trauma. No f/c. No abd pain. VSS. Pt AAO, NAD, Well appearing. +tenderness to L upper chest wall and shoulder, FROM. Lungs cta b/l. Abd soft, nt, nd. CP/shoulder pain, reproducible. Labs/ CTA done and with NAD. Pain likely of MSK etiology. Pain meds prn and pt to f/up outpt.

## 2018-11-26 NOTE — ED ADULT NURSE NOTE - OBJECTIVE STATEMENT
pt c.o L sided cp, intermittent with sob and radiation down L arm x 2 days. pt also presents with ed with multiple other medical complaints. pt c.o feeling "warmth" all over her body, denies fever/chills. c.o nausea. no v/d. reports recent flu. denies cough/runny nose at this time. pt states "I need antibiotics for a UTI" c.o urinary frequency and dysuria. ekg completed. iv placed, labs drawn. last chemo was approx 2 months ago. hx breast ca. will monitor.

## 2018-11-27 LAB
ANION GAP SERPL CALC-SCNC: 17 MMOL/L
BASOPHILS # BLD AUTO: 0.01 K/UL
BASOPHILS NFR BLD AUTO: 0.2 %
BUN SERPL-MCNC: 14 MG/DL
CALCIUM SERPL-MCNC: 10 MG/DL
CHLORIDE SERPL-SCNC: 104 MMOL/L
CO2 SERPL-SCNC: 22 MMOL/L
CREAT SERPL-MCNC: 0.88 MG/DL
CULTURE RESULTS: SIGNIFICANT CHANGE UP
EOSINOPHIL # BLD AUTO: 0.05 K/UL
EOSINOPHIL NFR BLD AUTO: 1.2 %
GLUCOSE SERPL-MCNC: 90 MG/DL
HCT VFR BLD CALC: 45 %
HGB BLD-MCNC: 15 G/DL
IMM GRANULOCYTES NFR BLD AUTO: 0 %
LYMPHOCYTES # BLD AUTO: 2.7 K/UL
LYMPHOCYTES NFR BLD AUTO: 64.4 %
MAN DIFF?: NORMAL
MCHC RBC-ENTMCNC: 33.3 GM/DL
MCHC RBC-ENTMCNC: 34.2 PG
MCV RBC AUTO: 102.7 FL
MONOCYTES # BLD AUTO: 0.37 K/UL
MONOCYTES NFR BLD AUTO: 8.8 %
NEUTROPHILS # BLD AUTO: 1.06 K/UL
NEUTROPHILS NFR BLD AUTO: 25.4 %
PLATELET # BLD AUTO: 282 K/UL
POTASSIUM SERPL-SCNC: 3.7 MMOL/L
RBC # BLD: 4.38 M/UL
RBC # FLD: 14.3 %
SODIUM SERPL-SCNC: 143 MMOL/L
SPECIMEN SOURCE: SIGNIFICANT CHANGE UP
WBC # FLD AUTO: 4.19 K/UL

## 2018-11-27 RX ORDER — CEFUROXIME AXETIL 250 MG
1 TABLET ORAL
Qty: 14 | Refills: 0
Start: 2018-11-27 | End: 2018-12-03

## 2018-11-30 ENCOUNTER — APPOINTMENT (OUTPATIENT)
Dept: HEART AND VASCULAR | Facility: CLINIC | Age: 58
End: 2018-11-30
Payer: MEDICAID

## 2018-12-04 ENCOUNTER — APPOINTMENT (OUTPATIENT)
Dept: RADIATION ONCOLOGY | Facility: CLINIC | Age: 58
End: 2018-12-04

## 2018-12-06 ENCOUNTER — APPOINTMENT (OUTPATIENT)
Dept: HEART AND VASCULAR | Facility: CLINIC | Age: 58
End: 2018-12-06
Payer: MEDICAID

## 2018-12-06 ENCOUNTER — APPOINTMENT (OUTPATIENT)
Dept: PLASTIC SURGERY | Facility: CLINIC | Age: 58
End: 2018-12-06
Payer: MEDICAID

## 2018-12-06 VITALS — BODY MASS INDEX: 27.8 KG/M2 | HEIGHT: 66.14 IN | WEIGHT: 172.99 LBS

## 2018-12-06 PROCEDURE — 93306 TTE W/DOPPLER COMPLETE: CPT

## 2018-12-06 PROCEDURE — 99213 OFFICE O/P EST LOW 20 MIN: CPT

## 2018-12-10 ENCOUNTER — APPOINTMENT (OUTPATIENT)
Dept: HEART AND VASCULAR | Facility: CLINIC | Age: 58
End: 2018-12-10

## 2018-12-12 ENCOUNTER — APPOINTMENT (OUTPATIENT)
Dept: HEART AND VASCULAR | Facility: CLINIC | Age: 58
End: 2018-12-12
Payer: MEDICAID

## 2018-12-12 VITALS
HEIGHT: 65.75 IN | BODY MASS INDEX: 29.76 KG/M2 | HEART RATE: 70 BPM | DIASTOLIC BLOOD PRESSURE: 80 MMHG | OXYGEN SATURATION: 98 % | TEMPERATURE: 98.6 F | SYSTOLIC BLOOD PRESSURE: 122 MMHG | WEIGHT: 182.98 LBS

## 2018-12-12 PROCEDURE — 93000 ELECTROCARDIOGRAM COMPLETE: CPT

## 2018-12-12 PROCEDURE — 99213 OFFICE O/P EST LOW 20 MIN: CPT

## 2018-12-12 RX ORDER — LISINOPRIL 30 MG/1
TABLET ORAL
Refills: 0 | Status: DISCONTINUED | COMMUNITY
End: 2018-12-12

## 2018-12-12 NOTE — PHYSICAL EXAM
[General Appearance - Well Developed] : well developed [Normal Appearance] : normal appearance [Well Groomed] : well groomed [General Appearance - Well Nourished] : well nourished [No Deformities] : no deformities [General Appearance - In No Acute Distress] : no acute distress [Normal Conjunctiva] : the conjunctiva exhibited no abnormalities [Eyelids - No Xanthelasma] : the eyelids demonstrated no xanthelasmas [Normal Oral Mucosa] : normal oral mucosa [No Oral Pallor] : no oral pallor [No Oral Cyanosis] : no oral cyanosis [Normal Jugular Venous A Waves Present] : normal jugular venous A waves present [Normal Jugular Venous V Waves Present] : normal jugular venous V waves present [No Jugular Venous Han A Waves] : no jugular venous han A waves [5th Left ICS - MCL] : palpated at the 5th LICS in the midclavicular line [Normal] : normal [II] : a grade 2 [No Pitting Edema] : no pitting edema present [Respiration, Rhythm And Depth] : normal respiratory rhythm and effort [Exaggerated Use Of Accessory Muscles For Inspiration] : no accessory muscle use [Auscultation Breath Sounds / Voice Sounds] : lungs were clear to auscultation bilaterally [Nail Clubbing] : no clubbing of the fingernails [Cyanosis, Localized] : no localized cyanosis [Petechial Hemorrhages (___cm)] : no petechial hemorrhages [Skin Color & Pigmentation] : normal skin color and pigmentation [] : no rash [No Venous Stasis] : no venous stasis [Skin Lesions] : no skin lesions [No Skin Ulcers] : no skin ulcer [No Xanthoma] : no  xanthoma was observed [Oriented To Time, Place, And Person] : oriented to person, place, and time [Affect] : the affect was normal [Mood] : the mood was normal [No Anxiety] : not feeling anxious [FreeTextEntry1] : right breast expander

## 2018-12-12 NOTE — REASON FOR VISIT
[Initial Evaluation] : an initial evaluation of [Heart Failure] : congestive heart failure [Mitral Regurgitation] : mitral regurgitation

## 2018-12-12 NOTE — ASSESSMENT
[FreeTextEntry1] : discussed with Ms. Parkinsony and Dr. Dominguez.\par Cardiac function has significantly improved. No rales or edema. MR may be contributing to dyspnea. More likely DUNN due to pulling on chest from expander.\par \par lasix prn\par f/u with radiation oncology and Dr. Costa (plastic surgery)\par

## 2018-12-12 NOTE — HISTORY OF PRESENT ILLNESS
[FreeTextEntry1] : Patient of [facility]\par \par 59 yo female, POOR HISTORIAN, former smoker, former drug abuser (quit 2006) with a PMHx of HIV diagnosed in 1997 (viral load unknown), schizophrenia?(was prescribed Risperdol in past but does not take), Asthma (prior ED visits/denies intubations), HTN, DMII, MR, cardiomyopathy, right breast cancer diagnosed in 2017, s/p chemotherapy (finished 11/2017) and s/p radical mastectomy w/ tissue expander (1/26/2018). On 3/8/18 she presented to [facility] ED from Dr. Costa's office's with complaint of progressively worsening SOB. Pt reports since she started chemotherapy last year she had experienced progressively worsening SOB. . In the ER, CTA PE protocol negative, Troponin 0.03, BNP 7,293, xray w/ Mild to moderate bilateral pleural effusions, small pericardial effusion, cardiomegaly, and increased right heart pressure, which may indicate a component of CHF. Patient was admitted to [facility] for acute on chronic systolic CHF exacerbation. TTE on 3/12/18 revealed moderate to severe mitral regurgitation at a blood pressure of 120/69 mmHg, Likely mechanism appears functional with posterior leaflet restriction, small circumferential pericardial effusion with a larger collection adjacent to the right atrium, no chamber collapse seen, when compared to prior study performed on 3/8/18, there is no significant change in mitral regurgitation. 3/12/18 left and right heart cath revealed non obstructive CAD, LVEF 30-35%, LVEDP 11, 3+. JACY on 3/13 revealed moderate concentric LVH, severe global hypokinesis of LV, LVEF 20-25%, moderate MR, moderate PI, pulmonary HTN with PASP 47, small pericardial effusion. She was discharged home on medical management on 3/13/18. \par surgical consult with Dr. John. She appears generally well, denies c/o chest pain, fever, lower extremity edema or palpitations. She reports mild DUNN and prn palpitations. \par  \par 11/26/18 no follow up since spring with ep or cardio.\par 100% compliant with meds\par no dunn edema\par ekg unchanged\par c/o arthritic left shoulder pain +ttp \par \par \par Systolic HF - will refer to adv chf, to optimize GDMT currently well compensated\par repeat echo\par Bhusri stopped lasix \par \par \par \par Labs, radiology: ekg\par echo 12/6/18\par 1. 45-50%, mild global hypokinesis. \par 2. The mitral valve is redundant. There is moderate mitral regurgitation. \par 3. No pericardial effusion. \par 4. Compared to March 2018 Ejection Fraction markedly improved from 20-25%. \par \par she notes DUNN and pulling in left chest where expander was implanted\par

## 2018-12-13 ENCOUNTER — RX RENEWAL (OUTPATIENT)
Age: 58
End: 2018-12-13

## 2019-02-08 ENCOUNTER — RX RENEWAL (OUTPATIENT)
Age: 59
End: 2019-02-08

## 2019-02-12 ENCOUNTER — APPOINTMENT (OUTPATIENT)
Dept: HEART AND VASCULAR | Facility: CLINIC | Age: 59
End: 2019-02-12

## 2019-02-20 ENCOUNTER — RX RENEWAL (OUTPATIENT)
Age: 59
End: 2019-02-20

## 2019-02-27 ENCOUNTER — APPOINTMENT (OUTPATIENT)
Dept: HEART AND VASCULAR | Facility: CLINIC | Age: 59
End: 2019-02-27
Payer: MEDICAID

## 2019-02-27 VITALS
OXYGEN SATURATION: 98 % | SYSTOLIC BLOOD PRESSURE: 124 MMHG | HEIGHT: 65.75 IN | WEIGHT: 170.99 LBS | TEMPERATURE: 97.8 F | DIASTOLIC BLOOD PRESSURE: 80 MMHG | HEART RATE: 70 BPM | BODY MASS INDEX: 27.81 KG/M2

## 2019-02-27 PROCEDURE — 99214 OFFICE O/P EST MOD 30 MIN: CPT

## 2019-02-27 PROCEDURE — 93000 ELECTROCARDIOGRAM COMPLETE: CPT

## 2019-02-27 NOTE — DISCUSSION/SUMMARY
[FreeTextEntry1] : The number of diagnostic and/or management options \par \par Systolic HF - will refer to adv chf, to optimize GDMT currently well compensated\par repeat echo\par \par MR - medical mgmt - shelly lrepeat wcho and f/u EP for possible ICD placement\par \par \par \par Labs, radiology: ekg\par \par Discussion of the case with another healthcare provider: asim croft\par \par Overall Risk: High\par

## 2019-02-27 NOTE — HISTORY OF PRESENT ILLNESS
[FreeTextEntry1] : 56 yo female, POOR HISTORIAN, former smoker, former drug abuser (quit 2006) with a PMHx of HIV diagnosed in 1997 (viral load unknown), schizophrenia?(was prescribed Risperdol in past but does not take), Asthma (prior ED visits/denies intubations), HTN, DMII, MR, cardiomyopathy, right breast cancer diagnosed in 2017, s/p chemotherapy (finished 11/2017) and s/p radical mastectomy w/ tissue expander (1/26/2018). On 3/8/18 she presented to [facility] ED from Dr. Costa's office's with complaint of progressively worsening SOB. Pt reports since she started chemotherapy last year she had experienced progressively worsening SOB. . In the ER, CTA PE protocol negative, Troponin 0.03, BNP 7,293, xray w/ Mild to moderate bilateral pleural effusions, small pericardial effusion, cardiomegaly, and increased right heart pressure, which may indicate a component of CHF. Patient was admitted to [facility] for acute on chronic systolic CHF exacerbation. TTE on 3/12/18 revealed moderate to severe mitral regurgitation at a blood pressure of 120/69 mmHg, Likely mechanism appears functional with posterior leaflet restriction, small circumferential pericardial effusion with a larger collection adjacent to the right atrium, no chamber collapse seen, when compared to prior study performed on 3/8/18, there is no significant change in mitral regurgitation. 3/12/18 left and right heart cath revealed non obstructive CAD, LVEF 30-35%, LVEDP 11, 3+. JACY on 3/13 revealed moderate concentric LVH, severe global hypokinesis of LV, LVEF 20-25%, moderate MR, moderate PI, pulmonary HTN with PASP 47, small pericardial effusion. She was discharged home on medical management on 3/13/18. \par She presents today for surgical consult with Dr. John. She appears generally well, denies c/o chest pain, fever, lower extremity edema or palpitations. She reports mild DUNN and prn palpitations. \par  \par 11/26/18 no follow up since spring with ep or cardio.\par 100% compliant with meds\par no dunn edema\par ekg unchanged\par c/o arthritic left shoulder pain +ttp\par \par 2/27/19 s/p 5 days at [facility] for acute systolic hf 2/2 non compliance, currently euvolemic\par USOH, 100% compliant with meds\par location: chest\par duration: na\par  modifying factors: na\par timing: na\par severity: 0/10\par EKG unchanged from prior (in chart)\par consultation notes reviewed where appropriate\par

## 2019-03-05 ENCOUNTER — APPOINTMENT (OUTPATIENT)
Dept: HEART AND VASCULAR | Facility: CLINIC | Age: 59
End: 2019-03-05
Payer: MEDICAID

## 2019-03-05 VITALS
SYSTOLIC BLOOD PRESSURE: 140 MMHG | HEART RATE: 71 BPM | OXYGEN SATURATION: 99 % | TEMPERATURE: 98.9 F | BODY MASS INDEX: 27.97 KG/M2 | WEIGHT: 171.98 LBS | DIASTOLIC BLOOD PRESSURE: 86 MMHG | HEIGHT: 65.75 IN

## 2019-03-05 PROCEDURE — 99215 OFFICE O/P EST HI 40 MIN: CPT

## 2019-03-05 RX ORDER — METOPROLOL TARTRATE 25 MG/1
25 TABLET, FILM COATED ORAL
Qty: 180 | Refills: 2 | Status: DISCONTINUED | COMMUNITY
Start: 2018-03-14 | End: 2019-03-05

## 2019-03-05 NOTE — PHYSICAL EXAM
[General Appearance - Well Developed] : well developed [Normal Appearance] : normal appearance [Well Groomed] : well groomed [General Appearance - Well Nourished] : well nourished [No Deformities] : no deformities [General Appearance - In No Acute Distress] : no acute distress [Normal Conjunctiva] : the conjunctiva exhibited no abnormalities [Eyelids - No Xanthelasma] : the eyelids demonstrated no xanthelasmas [Normal Oral Mucosa] : normal oral mucosa [No Oral Pallor] : no oral pallor [No Oral Cyanosis] : no oral cyanosis [Normal Jugular Venous A Waves Present] : normal jugular venous A waves present [Normal Jugular Venous V Waves Present] : normal jugular venous V waves present [No Jugular Venous Han A Waves] : no jugular venous han A waves [Respiration, Rhythm And Depth] : normal respiratory rhythm and effort [Exaggerated Use Of Accessory Muscles For Inspiration] : no accessory muscle use [Auscultation Breath Sounds / Voice Sounds] : lungs were clear to auscultation bilaterally [Nail Clubbing] : no clubbing of the fingernails [Cyanosis, Localized] : no localized cyanosis [Petechial Hemorrhages (___cm)] : no petechial hemorrhages [Skin Color & Pigmentation] : normal skin color and pigmentation [] : no rash [No Venous Stasis] : no venous stasis [Skin Lesions] : no skin lesions [No Skin Ulcers] : no skin ulcer [No Xanthoma] : no  xanthoma was observed [Oriented To Time, Place, And Person] : oriented to person, place, and time [Affect] : the affect was normal [Mood] : the mood was normal [No Anxiety] : not feeling anxious [5th Left ICS - MCL] : palpated at the 5th LICS in the midclavicular line [Normal] : normal [II] : a grade 2 [No Pitting Edema] : no pitting edema present [Normal S1] : normal S1 [Normal S2] : normal S2 [FreeTextEntry1] : right breast expander

## 2019-03-05 NOTE — HISTORY OF PRESENT ILLNESS
[FreeTextEntry1] : Patient of [facility]\par \par 59 yo female, not sure of her meds former smoker, former drug abuser (quit 2006) with a PMHx of HIV diagnosed in 1997 (viral load unknown),CHF, right breast cancer diagnosed in 2017, s/p chemotherapy (finished 11/2017) and s/p radical mastectomy w/ tissue expander (1/26/2018). \par \par On 3/8/18 she presented to [facility] ED from Dr. Costa's office's with complaint of progressively worsening SOB. Pt reports since she started chemotherapy last year she had experienced progressively worsening SOB. . In the ER, CTA PE protocol negative, Troponin 0.03, BNP 7,293, xray w/ Mild to moderate bilateral pleural effusions, small pericardial effusion, cardiomegaly, and increased right heart pressure, which may indicate a component of CHF. Patient was admitted to [facility] for acute on chronic systolic CHF exacerbation. TTE on 3/12/18 revealed moderate to severe mitral regurgitation.\par  3/12/18 left and right heart cath revealed non obstructive CAD, LVEF 30-35%, LVEDP 11, 3+. JACY on 3/13 revealed moderate concentric LVH, severe global hypokinesis of LV, LVEF 20-25%, moderate MR, moderate PI, pulmonary HTN with PASP 47, small pericardial effusion. She was discharged home on medical management on 3/13/18. \par \par c/o arthritic left shoulder pain +ttp \par \par echo 12/6/18\par 1. 45-50%, mild global hypokinesis. \par 2. The mitral valve is redundant. There is moderate mitral regurgitation. \par 3. No pericardial effusion. \par 4. Compared to March 2018 Ejection Fraction markedly improved from 20-25%. \par \par she notes DUNN and pulling in left chest where expander was implanted\par \par 2/27/19 s/p 5 days at [facility] [facility] for acute systolic hf 2/2 non compliance, \par \par currently euvolemic\par \par

## 2019-03-05 NOTE — ASSESSMENT
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 Costa (plastic surgery).\par \par No edema.  and HR 71.\par \par Will change metoprolol tartrate 25 bid to toprol 100 qd\par will increase entresto 24-26 to 49-51 bid\par \par urged f/u with radiation oncology\par would defer ICD at this time with EF > 35% and unknown status of further surgeries.\par Once on GDMT would repeat echo.\par \par f/u Bhusri 2 weeks, with me in 6 weeks\par

## 2019-03-12 ENCOUNTER — RX RENEWAL (OUTPATIENT)
Age: 59
End: 2019-03-12

## 2019-03-25 ENCOUNTER — OUTPATIENT (OUTPATIENT)
Dept: OUTPATIENT SERVICES | Facility: HOSPITAL | Age: 59
LOS: 1 days | End: 2019-03-25
Payer: COMMERCIAL

## 2019-03-25 DIAGNOSIS — Z98.890 OTHER SPECIFIED POSTPROCEDURAL STATES: Chronic | ICD-10-CM

## 2019-03-25 PROCEDURE — 78306 BONE IMAGING WHOLE BODY: CPT | Mod: 26

## 2019-03-25 PROCEDURE — A9503: CPT

## 2019-03-25 PROCEDURE — 78306 BONE IMAGING WHOLE BODY: CPT

## 2019-03-27 ENCOUNTER — APPOINTMENT (OUTPATIENT)
Dept: HEART AND VASCULAR | Facility: CLINIC | Age: 59
End: 2019-03-27

## 2019-04-05 ENCOUNTER — APPOINTMENT (OUTPATIENT)
Dept: RADIATION ONCOLOGY | Facility: CLINIC | Age: 59
End: 2019-04-05

## 2019-04-09 ENCOUNTER — APPOINTMENT (OUTPATIENT)
Dept: HEART AND VASCULAR | Facility: CLINIC | Age: 59
End: 2019-04-09

## 2019-04-09 NOTE — PHYSICAL EXAM
[General Appearance - Well Developed] : well developed [General Appearance - Alert] : alert [General Appearance - In No Acute Distress] : in no acute distress [PERRL With Normal Accommodation] : pupils were equal in size, round, reactive to light [Normal Oral Cavity] : oral cavity was normal [Heart Rate And Rhythm] : heart rate and rhythm were normal [Heart Sounds] : normal S1 and S2 [Arterial Pulses Normal] : the arterial pulses were normal [Bowel Sounds] : normal bowel sounds [Abdomen Soft] : soft [Motor Tone] : muscle strength and tone were normal [Normal] : normal skin color and pigmentation and no rash [No Focal Deficits] : no focal deficits [Oriented To Time, Place, And Person] : oriented to person, place, and time [de-identified] : Right breast mastectomy surgical incision healed. Right breast expander in place. Receiving expanders by Dr. Costa [de-identified] : Right breast mastectomy surgical incision healed. Right breast expander in place.

## 2019-04-09 NOTE — HISTORY OF PRESENT ILLNESS
[FreeTextEntry1] : Ms. Patterson is 57 year old woman with a PMH significant for CHF, DM, HIV, and hypertension,former smoker, former drug abuser (quit 2006) with a PMHx of HIV diagnosed in 1997 (viral load unknown), schizophrenia?(was prescribed Risperdol in past but does not take),  History of a [id number] (AJCC IIIA) poorly differentiated ductal carcinoma s/p right MRM and tissue expander placement.  0/24 right axillary LN positive, including 6 sentinel nodes.  Had 4 cycles of chemotherapy pre-surgery.  All margins > 5 mm.  Mass 5.5 x 4.2 x 3.2 cm in the lower inner quadrant.  1.5 cm satellite lesion was evident per-chemotherapy.  Expanders placed intraoperatively, undergoing serial expansions currently with 2 more planned, as of 3/2018.  Managing physicians include Julissa Sage and Jean. She presents today for a reconsult for consideration for radiation therapy for breast cancer. \par \par She was last here on 3/30/18. Soon after she was admitted to [facility] for a CHF exacerbation. [facility] Dr [facility], after. At that time, 3/2018, Dr. John felt that her right breast cancer needed to be addressed/completed prior to proceeding with mitral valve surgery. At that time she was counseled on restricting salt intake and checking daily weights and a BIV ICD placement was recommended. Her last cardio follow up was on 3/5/19 for evaluation of chf and mitral regurg. She had another hospitalization 2/27/19 for 5 days at [facility] for acute systolic hf secondary to non compliance. \par \par \par \par Ms. Patterson had calcifications seen on a mammogram in December 2016 in the right lower inner quadrant. She initially felt a lump in her breast in the Spring of 2017. Mammogram on 3/23/17 showed right breast calcifications, biopsy recommended. She underwent biopsy of the right breast, lower inner calcifications on 5/30/17. Pathology revealed invasive ductal carcinoma, poorly differentiated, spanning 8mm in this materia. There was additionally DCIS, solid and papillary types with high nuclear grade, marked necrosis and calcifications. \par \par She saw Dr. Jean in July 2017. \par \par She underwent an MRI on 8/23/17 which revealed large elongated heterogeneous mass int he lower inner quadrant of the right breast consistent with the patient's known DCIS, with a 1.5 cm satellite lesion more anteriorly toward the nipple. Consider focused preoperative ultrasound or preoperative ZELDA localization to include the complete mass. Left breast MRI was unremarkable. There was mild bilateral axillary lymphadenopathy, left greater than right, possibly reactive. \par \par She underwent additional biopsy of right breast, 4:00 area, 2 cm FN on 9/18/17. This revealed poorly differentiated invasive duct carcinoma. She missed multiple appointments for this biopsy. \par \par She saw Dr. Pena in August 2017. It was determined that she would have chemotherapy. She received 4 cycles of chemotherapy from Nov. -Dec. 2017\par \par She underwent right mastectomy on 1/25/18. Results revealed poorly differentiated invasive duct carcinoma.. 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 There was duct ectasia. Tumor size was 5.5 cm. 24 total lymph nodes were examined, 6 of them sentinel nodes. All were negative for tumor. Margins negative. ER/MI negative. HER2 negative\par She had tissue expanders placed on 1/25/18 by Dr. Costa and is currently undergoing expansion of the right breast.\par \par \par \par \par

## 2019-04-09 NOTE — REVIEW OF SYSTEMS
[Patient Intake Form Reviewed] : Patient intake form was reviewed [Recent Change In Weight] : ~T recent weight change [Negative] : Gastrointestinal [Fatigue: Grade 0] : Fatigue: Grade 0 [Chills] : no chills [Fever] : no fever [Night Sweats] : no night sweats [Fatigue] : no fatigue [Dysphagia] : no dysphagia [Palpitations] : no palpitations [Shortness Of Breath] : no shortness of breath [Chest Pain] : no chest pain [Cough] : no cough [Urinary Frequency] : no change in urinary frequency [FreeTextEntry2] : post chemotherapy [Hot Flashes] : no hot flashes [Swollen Glands] : no swollen glands [de-identified] : Right breast mastectomy surgical incision healed. Right breast expander in place. Receiving expanders by Dr. Garza hx genital herpes [de-identified] : was dizzy this am but now resolved

## 2019-04-09 NOTE — HISTORY OF PRESENT ILLNESS
[FreeTextEntry1] : Ms. Patterson is 57 year old woman with a PMH significant for CHF, DM, HIV, and hypertension,former smoker, former drug abuser (quit 2006) with a PMHx of HIV diagnosed in 1997 (viral load unknown), schizophrenia?(was prescribed Risperdol in past but does not take),  History of a [id number] (AJCC IIIA) poorly differentiated ductal carcinoma s/p right MRM and tissue expander placement.  0/24 right axillary LN positive, including 6 sentinel nodes.  Had 4 cycles of chemotherapy pre-surgery.  All margins > 5 mm.  Mass 5.5 x 4.2 x 3.2 cm in the lower inner quadrant.  1.5 cm satellite lesion was evident per-chemotherapy.  Expanders placed intraoperatively, undergoing serial expansions currently with 2 more planned, as of 3/2018.  Managing physicians include Julissa Sage and Jean. She presents today for a reconsult for consideration for radiation therapy for breast cancer. \par \par She was last here on 3/30/18. Soon after she was admitted to [facility] for a CHF exacerbation. [facility], after. At that time, 3/2018, Dr. John felt that her right breast cancer needed to be addressed/completed prior to proceeding with mitral valve surgery. At that time she was counseled on restricting salt intake and checking daily weights and a BIV ICD placement was recommended. Her last cardio follow up was on 3/5/19 for evaluation of chf and mitral regurg. She had another hospitalization 2/27/19 for 5 days at [place] for acute systolic hf secondary to non compliance. \par \par \par \par Ms. Patterson had calcifications seen on a mammogram in December 2016 in the right lower inner quadrant. She initially felt a lump in her breast in the Spring of 2017. Mammogram on 3/23/17 showed right breast calcifications, biopsy recommended. She underwent biopsy of the right breast, lower inner calcifications on 5/30/17. Pathology revealed invasive ductal carcinoma, poorly differentiated, spanning 8mm in this materia. There was additionally DCIS, solid and papillary types with high nuclear grade, marked necrosis and calcifications. \par \par She saw Dr. Jean in July 2017. \par \par She underwent an MRI on 8/23/17 which revealed large elongated heterogeneous mass int he lower inner quadrant of the right breast consistent with the patient's known DCIS, with a 1.5 cm satellite lesion more anteriorly toward the nipple. Consider focused preoperative ultrasound or preoperative ZELDA localization to include the complete mass. Left breast MRI was unremarkable. There was mild bilateral axillary lymphadenopathy, left greater than right, possibly reactive. \par \par She underwent additional biopsy of right breast, 4:00 area, 2 cm FN on 9/18/17. This revealed poorly differentiated invasive duct carcinoma. She missed multiple appointments for this biopsy. \par \par She saw Dr. Pena in August 2017. It was determined that she would have chemotherapy. She received 4 cycles of chemotherapy from Nov. -Dec. 2017\par \par She underwent right mastectomy on 1/25/18. Results revealed poorly differentiated invasive duct carcinoma.. 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 There was duct ectasia. Tumor size was 5.5 cm. 24 total lymph nodes were examined, 6 of them sentinel nodes. All were negative for tumor. Margins negative. ER/NM negative. HER2 negative\par She had tissue expanders placed on 1/25/18 by Dr. Costa and is currently undergoing expansion of the right breast.\par \par \par \par \par

## 2019-04-09 NOTE — REVIEW OF SYSTEMS
[Patient Intake Form Reviewed] : Patient intake form was reviewed [Recent Change In Weight] : ~T recent weight change [Negative] : Gastrointestinal [Fatigue: Grade 0] : Fatigue: Grade 0 [Fever] : no fever [Chills] : no chills [Dysphagia] : no dysphagia [Night Sweats] : no night sweats [Fatigue] : no fatigue [Chest Pain] : no chest pain [Shortness Of Breath] : no shortness of breath [Palpitations] : no palpitations [Urinary Frequency] : no change in urinary frequency [Cough] : no cough [Hot Flashes] : no hot flashes [Swollen Glands] : no swollen glands [FreeTextEntry2] : post chemotherapy [de-identified] : Right breast mastectomy surgical incision healed. Right breast expander in place. Receiving expanders by Dr. Garza hx genital herpes [de-identified] : was dizzy this am but now resolved

## 2019-04-09 NOTE — PHYSICAL EXAM
[General Appearance - Well Developed] : well developed [General Appearance - Alert] : alert [General Appearance - In No Acute Distress] : in no acute distress [PERRL With Normal Accommodation] : pupils were equal in size, round, reactive to light [Normal Oral Cavity] : oral cavity was normal [Heart Rate And Rhythm] : heart rate and rhythm were normal [Heart Sounds] : normal S1 and S2 [Arterial Pulses Normal] : the arterial pulses were normal [Bowel Sounds] : normal bowel sounds [Abdomen Soft] : soft [Motor Tone] : muscle strength and tone were normal [Normal] : normal skin color and pigmentation and no rash [No Focal Deficits] : no focal deficits [Oriented To Time, Place, And Person] : oriented to person, place, and time [de-identified] : Right breast mastectomy surgical incision healed. Right breast expander in place. Receiving expanders by Dr. Costa [de-identified] : Right breast mastectomy surgical incision healed. Right breast expander in place.

## 2019-05-16 ENCOUNTER — APPOINTMENT (OUTPATIENT)
Dept: HEART AND VASCULAR | Facility: CLINIC | Age: 59
End: 2019-05-16
Payer: MEDICAID

## 2019-05-17 ENCOUNTER — APPOINTMENT (OUTPATIENT)
Dept: HEART AND VASCULAR | Facility: CLINIC | Age: 59
End: 2019-05-17
Payer: MEDICAID

## 2019-05-17 PROCEDURE — 93306 TTE W/DOPPLER COMPLETE: CPT

## 2019-05-20 ENCOUNTER — APPOINTMENT (OUTPATIENT)
Dept: HEART AND VASCULAR | Facility: CLINIC | Age: 59
End: 2019-05-20
Payer: MEDICAID

## 2019-05-20 VITALS
WEIGHT: 181.99 LBS | DIASTOLIC BLOOD PRESSURE: 80 MMHG | HEART RATE: 77 BPM | BODY MASS INDEX: 29.6 KG/M2 | HEIGHT: 65.75 IN | SYSTOLIC BLOOD PRESSURE: 130 MMHG | OXYGEN SATURATION: 98 % | TEMPERATURE: 98.4 F

## 2019-05-20 DIAGNOSIS — C50.919 MALIGNANT NEOPLASM OF UNSPECIFIED SITE OF UNSPECIFIED FEMALE BREAST: ICD-10-CM

## 2019-05-20 PROCEDURE — 99214 OFFICE O/P EST MOD 30 MIN: CPT

## 2019-05-20 NOTE — REASON FOR VISIT
[Heart Failure] : congestive heart failure [Mitral Regurgitation] : mitral regurgitation [Follow-Up - Clinic] : a clinic follow-up of

## 2019-05-20 NOTE — HISTORY OF PRESENT ILLNESS
[FreeTextEntry1] : Patient of [facility]\par seen on 3/5/19\par \par 59 yo female,  former smoker, former drug abuser (quit 2006) with a PMHx of HIV diagnosed in 1997 (viral load unknown),CHF, right breast cancer diagnosed in 2017, s/p chemotherapy (finished 11/2017) and s/p radical mastectomy w/ tissue expander (1/26/2018). \par \par On 3/8/18 she presented to [facility] ED from Dr. Costa's office's with complaint of progressively worsening SOB. Pt reports since she started chemotherapy last year she had experienced progressively worsening SOB. . In the ER, CTA PE protocol negative, Troponin 0.03, BNP 7,293, xray w/ Mild to moderate bilateral pleural effusions, small pericardial effusion, cardiomegaly, and increased right heart pressure, which may indicate a component of CHF. Patient was admitted to [facility] for acute on chronic systolic CHF exacerbation. TTE on 3/12/18 revealed moderate to severe mitral regurgitation.\par  3/12/18 left and right heart cath revealed non obstructive CAD, LVEF 30-35%, LVEDP 11, 3+. JACY on 3/13 revealed moderate concentric LVH, severe global hypokinesis of LV, LVEF 20-25%, moderate MR, moderate PI, pulmonary HTN with PASP 47, small pericardial effusion. She was discharged home on medical management on 3/13/18. \par \par c/o arthritic left shoulder pain +ttp \par \par echo 12/6/18\par 1. 45-50%, mild global hypokinesis. \par 2. The mitral valve is redundant. There is moderate mitral regurgitation. \par 3. No pericardial effusion. \par 4. Compared to March 2018 Ejection Fraction markedly improved from 20-25%. \par \par at March visit:\par No edema.  and HR 71.\par \par changed metoprolol tartrate 25 bid to toprol 100 qd\par increased entresto 24-26 to 49-51 bid\par \par urged f/u with radiation oncology: has not gone\par deferred ICD at this time with EF > 35% and unknown status of further surgeries.\par \par repeat ECHO 5/17/19\par 1. Mild global hypokinesis LVEF 40-45%. \par 2. Moderate mitral regurgitation. \par 3. No pericardial effusion. \par \par taking meds\par c/o tingling in left arm\par denies orthopnea, PND, DUNN\par

## 2019-05-20 NOTE — ASSESSMENT
[FreeTextEntry1] : \par idiopathic dilated CM. Non-obstructive on cath\par \par  EF 40-45%\par left arm shows no swelling. Tingling related to chemotherapy meds.\par also states breast 'hurts'\par needs  f/u with radiation oncology, Dr. Costa (plastic surgery) and oncologist\par \par tolerating toprol 100 qd and  entresto  49-51 bid\par \par urged f/u with radiation oncology\par no indication for prophylactic  ICD at this time with EF 40-45%\par \par f/u Bhusri 2 weeks in 2 weeks (on schedule)\par \par ECHO 5/17/19\par 1. Mild global hypokinesis LVEF 40-45%. \par 2. Moderate mitral regurgitation. \par 3. No pericardial effusion. \par

## 2019-05-20 NOTE — PHYSICAL EXAM
[General Appearance - Well Developed] : well developed [Normal Appearance] : normal appearance [Well Groomed] : well groomed [General Appearance - Well Nourished] : well nourished [No Deformities] : no deformities [General Appearance - In No Acute Distress] : no acute distress [Normal Conjunctiva] : the conjunctiva exhibited no abnormalities [Eyelids - No Xanthelasma] : the eyelids demonstrated no xanthelasmas [Normal Oral Mucosa] : normal oral mucosa [No Oral Pallor] : no oral pallor [No Oral Cyanosis] : no oral cyanosis [Normal Jugular Venous A Waves Present] : normal jugular venous A waves present [Normal Jugular Venous V Waves Present] : normal jugular venous V waves present [No Jugular Venous Han A Waves] : no jugular venous han A waves [Respiration, Rhythm And Depth] : normal respiratory rhythm and effort [Exaggerated Use Of Accessory Muscles For Inspiration] : no accessory muscle use [Auscultation Breath Sounds / Voice Sounds] : lungs were clear to auscultation bilaterally [Nail Clubbing] : no clubbing of the fingernails [Cyanosis, Localized] : no localized cyanosis [Petechial Hemorrhages (___cm)] : no petechial hemorrhages [Skin Color & Pigmentation] : normal skin color and pigmentation [] : no rash [No Venous Stasis] : no venous stasis [Skin Lesions] : no skin lesions [No Skin Ulcers] : no skin ulcer [No Xanthoma] : no  xanthoma was observed [Oriented To Time, Place, And Person] : oriented to person, place, and time [Affect] : the affect was normal [Mood] : the mood was normal [No Anxiety] : not feeling anxious [5th Left ICS - MCL] : palpated at the 5th LICS in the midclavicular line [Normal] : normal [Normal S1] : normal S1 [Normal S2] : normal S2 [II] : a grade 2 [No Pitting Edema] : no pitting edema present [FreeTextEntry1] : normal pulse left arm. No edema

## 2019-05-28 ENCOUNTER — APPOINTMENT (OUTPATIENT)
Dept: HEART AND VASCULAR | Facility: CLINIC | Age: 59
End: 2019-05-28

## 2019-06-11 ENCOUNTER — APPOINTMENT (OUTPATIENT)
Dept: HEART AND VASCULAR | Facility: CLINIC | Age: 59
End: 2019-06-11
Payer: MEDICAID

## 2019-06-11 VITALS
BODY MASS INDEX: 28.95 KG/M2 | OXYGEN SATURATION: 100 % | TEMPERATURE: 98.4 F | HEART RATE: 92 BPM | WEIGHT: 178 LBS | DIASTOLIC BLOOD PRESSURE: 62 MMHG | SYSTOLIC BLOOD PRESSURE: 124 MMHG | HEIGHT: 65.75 IN

## 2019-06-11 PROCEDURE — 99214 OFFICE O/P EST MOD 30 MIN: CPT

## 2019-06-11 PROCEDURE — 93000 ELECTROCARDIOGRAM COMPLETE: CPT

## 2019-06-14 NOTE — HISTORY OF PRESENT ILLNESS
[FreeTextEntry1] : 58 yo female, POOR HISTORIAN, former smoker, former drug abuser (quit 2006) with a PMHx of HIV diagnosed in 1997 (viral load unknown), schizophrenia?(was prescribed Risperdol in past but does not take), Asthma (prior ED visits/denies intubations), HTN, DMII, MR, cardiomyopathy, right breast cancer diagnosed in 2017, s/p chemotherapy (finished 11/2017) and s/p radical mastectomy w/ tissue expander (1/26/2018). On 3/8/18 she presented to [facility] ED from Dr. Costa's office's with complaint of progressively worsening SOB. Pt reports since she started chemotherapy last year she had experienced progressively worsening SOB. . In the ER, CTA PE protocol negative, Troponin 0.03, BNP 7,293, xray w/ Mild to moderate bilateral pleural effusions, small pericardial effusion, cardiomegaly, and increased right heart pressure, which may indicate a component of CHF. Patient was admitted to [facility] for acute on chronic systolic CHF exacerbation. TTE on 3/12/18 revealed moderate to severe mitral regurgitation at a blood pressure of 120/69 mmHg, Likely mechanism appears functional with posterior leaflet restriction, small circumferential pericardial effusion with a larger collection adjacent to the right atrium, no chamber collapse seen, when compared to prior study performed on 3/8/18, there is no significant change in mitral regurgitation. 3/12/18 left and right heart cath revealed non obstructive CAD, LVEF 30-35%, LVEDP 11, 3+. JACY on 3/13 revealed moderate concentric LVH, severe global hypokinesis of LV, LVEF 20-25%, moderate MR, moderate PI, pulmonary HTN with PASP 47, small pericardial effusion. She was discharged home on medical management on 3/13/18. \par She presents today for surgical consult with Dr. John. She appears generally well, denies c/o chest pain, fever, lower extremity edema or palpitations. She reports mild DUNN and prn palpitations. \par  \par 11/26/18 no follow up since spring with ep or cardio.\par 100% compliant with meds\par no dunn edema\par ekg unchanged\par c/o arthritic left shoulder pain +ttp\par \par 2/27/19 s/p 5 days at [place] for acute systolic hf 2/2 non compliance, currently euvolemic\par USOH, 100% compliant with meds\par location: chest\par duration: na\par  modifying factors: na\par timing: na\par severity: 0/10\par EKG unchanged from prior (in chart)\par consultation notes reviewed where appropriate\par \par 6/11/19 USOH, 100% compliant with meds\par location: chest\par duration: na\par  modifying factors: na\par timing: na\par severity: 0/10\par EKG unchanged from prior (in chart)\par consultation notes reviewed where appropriate\par  \par

## 2019-06-14 NOTE — PHYSICAL EXAM
[General Appearance - Well Developed] : well developed [Normal Appearance] : normal appearance [Well Groomed] : well groomed [General Appearance - Well Nourished] : well nourished [No Deformities] : no deformities [General Appearance - In No Acute Distress] : no acute distress [Normal Conjunctiva] : the conjunctiva exhibited no abnormalities [Eyelids - No Xanthelasma] : the eyelids demonstrated no xanthelasmas [Normal Oral Mucosa] : normal oral mucosa [No Oral Pallor] : no oral pallor [No Oral Cyanosis] : no oral cyanosis [Normal Jugular Venous A Waves Present] : normal jugular venous A waves present [Normal Jugular Venous V Waves Present] : normal jugular venous V waves present [No Jugular Venous Han A Waves] : no jugular venous hna A waves [Respiration, Rhythm And Depth] : normal respiratory rhythm and effort [Exaggerated Use Of Accessory Muscles For Inspiration] : no accessory muscle use [Auscultation Breath Sounds / Voice Sounds] : lungs were clear to auscultation bilaterally [Heart Rate And Rhythm] : heart rate and rhythm were normal [Heart Sounds] : normal S1 and S2 [Murmurs] : no murmurs present [Abdomen Tenderness] : non-tender [Abdomen Soft] : soft [Abdomen Mass (___ Cm)] : no abdominal mass palpated [Abnormal Walk] : normal gait [Gait - Sufficient For Exercise Testing] : the gait was sufficient for exercise testing [Nail Clubbing] : no clubbing of the fingernails [Cyanosis, Localized] : no localized cyanosis [Skin Color & Pigmentation] : normal skin color and pigmentation [Petechial Hemorrhages (___cm)] : no petechial hemorrhages [No Venous Stasis] : no venous stasis [Skin Lesions] : no skin lesions [] : no rash [No Xanthoma] : no  xanthoma was observed [Oriented To Time, Place, And Person] : oriented to person, place, and time [No Skin Ulcers] : no skin ulcer [Mood] : the mood was normal [No Anxiety] : not feeling anxious [Affect] : the affect was normal

## 2019-06-14 NOTE — DISCUSSION/SUMMARY
[FreeTextEntry1] : The number of diagnostic and/or management options \par \par Systolic HF - will refer to adv chf, to optimize GDMT currently well compensated\par repeat echo\par \par MR - medical mgmt - shelly lrepeat wcho and f/u EP for possible ICD placement\par \par full labs today\par \par Labs, radiology: ekg\par \par Discussion of the case with another healthcare provider: asim croft\par \par Overall Risk: High\par

## 2019-08-30 ENCOUNTER — APPOINTMENT (OUTPATIENT)
Dept: RADIATION ONCOLOGY | Facility: CLINIC | Age: 59
End: 2019-08-30

## 2019-09-06 NOTE — PHYSICAL EXAM
[General Appearance - Well Developed] : well developed [General Appearance - In No Acute Distress] : in no acute distress [General Appearance - Alert] : alert [PERRL With Normal Accommodation] : pupils were equal in size, round, reactive to light [Normal Oral Cavity] : oral cavity was normal [Heart Rate And Rhythm] : heart rate and rhythm were normal [Heart Sounds] : normal S1 and S2 [Arterial Pulses Normal] : the arterial pulses were normal [Bowel Sounds] : normal bowel sounds [Abdomen Soft] : soft [Motor Tone] : muscle strength and tone were normal [Normal] : normal skin color and pigmentation and no rash [No Focal Deficits] : no focal deficits [Oriented To Time, Place, And Person] : oriented to person, place, and time [de-identified] : Right breast mastectomy surgical incision healed. Right breast expander in place. Receiving expanders by Dr. Costa [de-identified] : Right breast mastectomy surgical incision healed. Right breast expander in place.

## 2019-09-06 NOTE — REVIEW OF SYSTEMS
[Patient Intake Form Reviewed] : Patient intake form was reviewed [Recent Change In Weight] : ~T recent weight change [Negative] : Gastrointestinal [Fatigue: Grade 0] : Fatigue: Grade 0 [Fever] : no fever [Chills] : no chills [Night Sweats] : no night sweats [Fatigue] : no fatigue [Dysphagia] : no dysphagia [Chest Pain] : no chest pain [Palpitations] : no palpitations [Shortness Of Breath] : no shortness of breath [Cough] : no cough [Urinary Frequency] : no change in urinary frequency [Hot Flashes] : no hot flashes [Swollen Glands] : no swollen glands [FreeTextEntry2] : post chemotherapy [de-identified] : Right breast mastectomy surgical incision healed. Right breast expander in place. Receiving expanders by Dr. Garza hx genital herpes [de-identified] : was dizzy this am but now resolved

## 2019-10-15 ENCOUNTER — RX RENEWAL (OUTPATIENT)
Age: 59
End: 2019-10-15

## 2020-01-30 ENCOUNTER — APPOINTMENT (OUTPATIENT)
Dept: HEART AND VASCULAR | Facility: CLINIC | Age: 60
End: 2020-01-30

## 2020-08-27 ENCOUNTER — APPOINTMENT (OUTPATIENT)
Dept: HEART AND VASCULAR | Facility: CLINIC | Age: 60
End: 2020-08-27

## 2020-09-09 ENCOUNTER — APPOINTMENT (OUTPATIENT)
Dept: HEART AND VASCULAR | Facility: CLINIC | Age: 60
End: 2020-09-09

## 2020-09-30 ENCOUNTER — APPOINTMENT (OUTPATIENT)
Dept: HEART AND VASCULAR | Facility: CLINIC | Age: 60
End: 2020-09-30
Payer: MEDICAID

## 2020-09-30 ENCOUNTER — NON-APPOINTMENT (OUTPATIENT)
Age: 60
End: 2020-09-30

## 2020-09-30 VITALS
DIASTOLIC BLOOD PRESSURE: 78 MMHG | BODY MASS INDEX: 26.83 KG/M2 | SYSTOLIC BLOOD PRESSURE: 138 MMHG | WEIGHT: 164.99 LBS | HEIGHT: 65.75 IN | TEMPERATURE: 98.5 F | OXYGEN SATURATION: 99 % | HEART RATE: 70 BPM

## 2020-09-30 DIAGNOSIS — Z87.39 PERSONAL HISTORY OF OTHER DISEASES OF THE MUSCULOSKELETAL SYSTEM AND CONNECTIVE TISSUE: ICD-10-CM

## 2020-09-30 LAB
ALBUMIN SERPL ELPH-MCNC: 4.6 G/DL
ALP BLD-CCNC: 67 U/L
ALT SERPL-CCNC: 23 U/L
ANION GAP SERPL CALC-SCNC: 16 MMOL/L
AST SERPL-CCNC: 19 U/L
BILIRUB SERPL-MCNC: 0.2 MG/DL
BUN SERPL-MCNC: 14 MG/DL
CALCIUM SERPL-MCNC: 10.3 MG/DL
CHLORIDE SERPL-SCNC: 104 MMOL/L
CO2 SERPL-SCNC: 24 MMOL/L
CREAT SERPL-MCNC: 0.98 MG/DL
GLUCOSE SERPL-MCNC: 93 MG/DL
NT-PROBNP SERPL-MCNC: 990 PG/ML
POTASSIUM SERPL-SCNC: 3.8 MMOL/L
PROT SERPL-MCNC: 7.6 G/DL
SODIUM SERPL-SCNC: 144 MMOL/L

## 2020-09-30 PROCEDURE — 36415 COLL VENOUS BLD VENIPUNCTURE: CPT

## 2020-09-30 PROCEDURE — 99214 OFFICE O/P EST MOD 30 MIN: CPT

## 2020-09-30 PROCEDURE — 93000 ELECTROCARDIOGRAM COMPLETE: CPT

## 2020-09-30 RX ORDER — BICTEGRAVIR SODIUM, EMTRICITABINE, AND TENOFOVIR ALAFENAMIDE FUMARATE 50; 200; 25 MG/1; MG/1; MG/1
50-200-25 TABLET ORAL
Refills: 0 | Status: ACTIVE | COMMUNITY

## 2020-09-30 RX ORDER — DICLOFENAC SODIUM 10 MG/G
1 GEL TOPICAL DAILY
Qty: 1 | Refills: 2 | Status: ACTIVE | COMMUNITY
Start: 2020-09-30 | End: 1900-01-01

## 2020-09-30 RX ORDER — GABAPENTIN 400 MG/1
400 CAPSULE ORAL TWICE DAILY
Refills: 0 | Status: ACTIVE | COMMUNITY

## 2020-09-30 NOTE — HISTORY OF PRESENT ILLNESS
[Preoperative Visit] : for a medical evaluation prior to surgery [Scheduled Procedure ___] : a [unfilled] [Date of Surgery ___] : on [unfilled] [Surgeon Name ___] : surgeon: [unfilled] [de-identified] : Tel: [phone] [FreeTextEntry1] : 60 F HTN HIV Breast Cancer s/p chemo no radiation s/p radical mastectomy with cardiomyopathy no obs CAD cath 2019 HCV former smoker former drug abuser here for preoperative cardiac optimization prior to dental work she states her BP is elevated and is short of breath \par \par \par

## 2020-09-30 NOTE — ASSESSMENT
[FreeTextEntry1] : increase entresto 49/51 mg po bid \par hold hydralazine furosemide for now \par fu next week\par she is optimized from a cardiac standpoint for dental work \par fu in one week

## 2020-10-19 NOTE — ED PROVIDER NOTE - CPE EDP ENMT NORM
[facility] Hospitalist History and Physical      Patient presents with:  Swelling Edema       PCP: Jazmyn Choi MD      History of Present Illness: Patient is a 60year old male with PMH sig for CAD, ischemic cardiomyopathy EF 10-15%, ho ICD, PVD, SONIDO, H General:  Alert, no distress, appears stated age. Head:  Normocephalic, without obvious abnormality, atraumatic. Eyes:  Sclera anicteric, No conjunctival pallor, EOMs intact. Nose: Nares normal. Septum midline. Mucosa normal. No drainage.    [place] Clam Mild pulmonary vascular congestion. Confluent alveolar opacity in right lower lung. Slight blunting of both costophrenic angles. CONCLUSION:  1. Confluent opacity in right lower lung most consistent with pneumonia.   Follow-up is recommended to - - -

## 2021-01-21 NOTE — DISCHARGE NOTE ADULT - PATIENT PORTAL LINK FT
BETHANY ZELAYA BLOOD PRESSURE 120/61 TAKEN 01/21/21    VERIFIED MEDS - PATIENT IS TAKING EVERYTHING BUT NOT TAKING THE TRULICITY.    SHE NEEDS REFILL ON HER FREESTYLE TEST STRIPS.  
“You can access the Follow[facility] Patient Portal, offered by [facility], by registering with the following website: [email or web address]”

## 2021-04-29 ENCOUNTER — APPOINTMENT (OUTPATIENT)
Dept: HEART AND VASCULAR | Facility: CLINIC | Age: 61
End: 2021-04-29

## 2021-07-22 NOTE — PROGRESS NOTE ADULT - ASSESSMENT
[facility] Call Center    Phone Message    May a detailed message be left on voicemail: no     Reason for Call: Other: Rishabh calling to request a call back to discuss what medication he is needing he is supposed to take on a daily basis for his hepatitis B. He does not read or write english, so he is not sure what medications he needs to refill. He has been out of medication for 3 weeks now. Please call Rishabh at your earliest convenience to discuss.     Action Taken: Message routed to:  [facility] ([facility]):  HEPATOLOGY    Travel Screening: Not Applicable                                                                       Plan  - cont JPs  - cont dressing  - OK to d/c home per gen surg  - f/u in office with Dr. Costa

## 2021-08-10 ENCOUNTER — APPOINTMENT (OUTPATIENT)
Dept: HEART AND VASCULAR | Facility: CLINIC | Age: 61
End: 2021-08-10

## 2021-09-03 NOTE — ED PROVIDER NOTE - NS_EDPROVIDERDISPOUSERTYPE_ED_A_ED
Attending Attestation (For Attendings USE Only)... Acitretin Pregnancy And Lactation Text: This medication is Pregnancy Category X and should not be given to women who are pregnant or may become pregnant in the future. This medication is excreted in breast milk.

## 2022-02-04 NOTE — ED PROVIDER NOTE - TOBACCO USE
I spoke with the patient. He was hospitalized at [facility] AND [facility] Unknown if ever smoked

## 2022-03-18 NOTE — ED PROVIDER NOTE - NS_EDPROVIDERDISPOUSERTYPE_ED_A_ED
[Time Spent: ___ minutes] : I have spent [unfilled] minutes of time on the encounter. I have personally evaluated and examined the patient. The Attending was available to me as a supervising provider if needed.

## 2022-07-28 NOTE — ED ADULT NURSE NOTE - EENT WDL
Eyes with no visual disturbances.  Ears clean and dry and no hearing difficulties. Nose with pink mucosa and no drainage.  Mouth mucous membranes moist and pink.  No tenderness or swelling to throat or neck. Complex Repair And Xenograft Text: The defect edges were debeveled with a #15 scalpel blade.  The primary defect was closed partially with a complex linear closure.  Given the location of the defect, shape of the defect and the proximity to free margins a xenograft was deemed most appropriate to repair the remaining defect.  The graft was trimmed to fit the size of the remaining defect.  The graft was then placed in the primary defect, oriented appropriately, and sutured into place.

## 2022-08-17 NOTE — ED PROVIDER NOTE - NSTIMEPROVIDERCAREINITIATE_GEN_ER
19-Dec-2017 11:07 Oxybutynin Counseling:  I discussed with the patient the risks of oxybutynin including but not limited to skin rash, drowsiness, dry mouth, difficulty urinating, and blurred vision.

## 2022-11-19 NOTE — BRIEF OPERATIVE NOTE - SPECIMENS
If your symptoms worsen within 24-48 hours, you will need to go to the ER(Emergency Room) asap(as soon as possible).    Re-evaluate in 2-3 days by personal doctor or earlier if worsening.    Saltwater spray in each nostril daily as often as every 2-3 hours with aggressive suctioning     Tylenol or ibuprofen as needed for discomfort and fever     UPPER RESPIRATORY INFECTION IN CHILDREN - General Information, English             Upper Respiratory Infection in Children  GENERAL INFORMATION:  What is it?The common cold is an infection (in-FEK-shun) that can affect your child's nose, throat, ears, and sinuses. It is also called an upper respiratory infection (URI). The common cold is usually not serious and does not need special treatment. Healthy children usually get at least five to eight colds each year. Your child' cold symptoms will be worst for the first three to five days. His cold should be gone in seven to fourteen days. Your child may continue to cough for two to three weeks after getting a cold.  What are the causes of a cold?The common cold is caused by a germ called a virus. There are many different viruses. The virus that causes a cold is contagious (maureen-DOROTEO-jus). This means it can easily be spread from person to person by coughing, sneezing, or touching hands. Children get colds more often in winter.  What are the signs and symptoms of a cold?How your child feels depends upon the virus that has caused the infection. Your child may have some or many of the following signs and symptoms:   Chills and a fever. The fever usually lasts one to three days.    Headache, body aches, or sore muscles.    Runny or stuffed up nose.    Decreased hunger or thirst.    Red, watery, and sore eyes.    Sneezing and coughing.    Sore throat. Your child may become hoarse (lose his voice) for a few days.    Tired or fussy.  How can I help my child while he has a cold?There is no cure for viral illnesses such as the common cold.  The following are things that you may do to help your child feel better when he has a cold:   There is no medicine that will make the cold go away. Antibiotic (ul-jn-zw-OT-ik) medicine will not work on illnesses caused by a virus, such as a cold. Acetaminophen (n-cfmq-x-MIN-oh-fen) or ibuprofen (eye-bu-PROH-fen) are over-the-counter pain medicines that may help your child's fever or pain. Do not give aspirin to children under 18 years old.    Your child needs to keep warm and get lots of rest. Ask your child's caregiver how much liquid your child should drink. Keep your child's nose free of mucus. Help him to blow his nose, or use a bulb syringe to clear a baby's nose. Use a cool-mist humidifier or vaporizer to help unplug your child's nose and help him breathe easier.    Wash your hands after helping your child. This prevents spreading the cold to other people. Teach your child to cover his mouth or nose when coughing or sneezing. Do not let your child share food or drinks with anyone. Try to keep your child away from other people during the first three to five days of his illness. Colds are spread most easily while your child has a fever and feels bad. To help keep your child from getting colds, keep him away from crowded places, especially in the winter. Feed your child healthy foods.  CARE AGREEMENT:  You have the right to help plan your child's care. Learn about your child's health condition and how it may be treated. Discuss treatment options with your child's caregivers to decide what care you want for your child.   © 5442-5732 [employer]. All rights reserved. Otitis Media in Children  GENERAL INFORMATION:  What is otitis media? Otitis media is an ear infection. Your child may have an ear infection in one or both ears. Children are most likely to get ear infections when they are between 3 months to 3 years old. Ear infections are most common during cold and flu season, usually in the winter and early  spring months. Children often have more than one ear infection.   What causes otitis media? Your child may get an ear infection when his eustachian tubes become swollen or blocked. Eustachian tubes are tiny tubes that connect the middle ear to the back of the nose and throat. Eustachian tubes drain fluid away from the middle ear. Children with ear infections have a buildup of fluid and pressure in their ear. The ear may become infected by germs, which grow easily in the fluid trapped behind the eardrum.        Who is at an increased risk for otitis media?   Children who go to day care or school.    Children who live or play around people who smoke.    Children whose brother, sister, or parent has had problems with ear infections.     Babies who get their first ear infection before 6 months of age.    Children born with conditions such as cleft palate or Down syndrome.    Children who suck on pacifiers after 10 months of age.    Children who drink bottles while they lie flat.  What are the signs and symptoms of otitis media? The following may help caregivers diagnose an ear infection:   Your child has a fever.    Your child has ear pain. He may tug, pull, or rub his ear. He may not eat as much due to pain when he sucks, swallows, or chews.    Your child is fussy and restless. He may also have trouble sleeping.    Your child has pus or yellowish fluid coming out of his ear.    Your child has trouble hearing. He may not hear quiet noises that he normally hears in the room. He may seem like he is not paying attention to you. He may try to sit very close to the TV or ask to turn up his music so it is louder.    Your child may be dizzy or lose his balance.  How is otitis media diagnosed? Your child's caregiver will look inside your child's ears. The caregiver may blow a puff of air inside your child's ears. These tests will tell caregivers if your child's eardrums look healthy. If your child's eardrum is infected, it will  not move as it should. A tympanogram is another test that may be done. During the test, an ear plug is put into each of your child's ears to see how the eardrum moves. It can help your child's caregiver learn if your child has fluid in his middle ear.  How is otitis media treated?   Medicines:     Antibiotics: Your child may be given antibiotics if he has an infection. Antibiotics help kill the germs that cause infection. Give this medicine until it is completely gone, even if your child feels better.    Ibuprofen or acetaminophen: These medicines are available without a prescription. They may decrease your child's pain and fever. Ask your child's healthcare provider to tell you the right amount of medicine to give to your child, and how often to give it.    Ear drops: This medicine may help treat your child's ear pain.    Give your child's medicine as directed: Call your child's primary healthcare provider if you think the medicine is not helping, or if he has side effects. Tell your child's healthcare provider if your child is taking any vitamins, herbs, or other medicines. Keep a list of the medicines he takes. Include the amounts, and when and why he takes them. Bring the list or the pill bottles to follow-up visits.    Do not give aspirin to children under 18 years of age: Your child could develop Reye syndrome if he takes aspirin when he is sick. Reye syndrome can cause life-threatening brain and liver damage. Check your child's medicine labels for aspirin, salicylates, or oil of wintergreen.  Surgery: Your child may need ear tubes if he has ear infections a lot. He may need them if he has fluid in his ears for a long time and it is causing hearing loss. The caregiver may do a surgery called a myringotomy. During a myringotomy, the caregiver will cut a small hole in your child's eardrum. The caregiver will put in a tiny tube to help fluid drain out of the ears. They may help keep your child from having ear  infections and hearing loss.  What can I do if my child cannot hear well? Ear infections can cause your child to have a short-term hearing loss. Your child's hearing may return to normal when the infection is gone. Sometimes ear infections lead to hearing loss. Hearing loss can cause your child to have problems with learning and speaking clearly. If you think your child's hearing does not fully return after an ear infection, tell his caregiver. Tests may need to be done to check your child's hearing.  What can I do to help prevent otitis media?   If possible, breastfeed your baby. Your baby may be less likely to get an ear infection if he is .    Do not give your child a bottle while he is lying down. This may cause liquid to leak into your child's eustachian tube.    Keep your child away from people who smoke. Babies and children who are around smokers are more likely to have ear infections.    Keep your child away from crowds. Germs are easily and quickly spread in  centers. Keep your child away from sick playmates. Try to keep him home if there is a cold or other infection going around the .    Wash your and your child's hands often. Wash with warm water and soap to help keep germs from spreading through your household.     Vaccinate your child. Talk to your child's caregiver about the shots your child needs.  Call your child's caregiver if:   Your child has a fever.    You see blood or pus draining from your child's ear. This may be a burst (torn) eardrum.    Your child is still not eating or drinking 24 hours after he takes his medicine.    Your child still has signs and symptoms of an ear infection 48 hours after he takes his medicine.    Your child has new problems that may be due to the medicine he is taking. These problems may include a new rash, nausea, or vomiting.  Seek care immediately or call 911 if:   Your child seems confused or cannot stay awake.    Your child has a stiff  neck, and a fever.  CARE AGREEMENT:  You have the right to help plan your child's care. Learn about your child's health condition and how it may be treated. Discuss treatment options with your child's caregivers to decide what care you want for your child.   © 8824-9052 [employer]. All rights reserved. [facility]    Monday - Friday 8:00 a.m. - 8:00 p.m.  Saturday - Sunday 8:00 a.m. - 4:00 p.m.    -*-*-*-*-*-*-*-  The [facility] is now OPEN Monday to Saturday  [id number] [place] [address]  -*-*-*-*-*-*-*-    [email or web address]  See current wait times for [facility] in real-time  Reserve your waiting-room spot in line     [email or web address]  [place] for the patient portal  See test results and make virtual visits with your primary care provider      ----------------  Thank you for choosing [facility] today.  We hope you had a pleasant experience and we look forward to serving your future needs.  If you receive a survey in the mail about today's services, we hope that you will take a few minutes to let us know about your experience.    If you have any questions about your VISIT, please call [phone]    If you have any questions about your BILL, please call [phone]    If you need a copy of your MEDICAL RECORD, please call [phone] or email [email or web address] or use the [email or web address] ada->My Record->Document Center->Download Medical Records->Send Medical Record Request.    --------------------------------------------------------------------------------------------------------------  UNLESS OTHERWISE INSTRUCTED BY YOUR URGENT CARE PROVIDER TODAY, all follow-up for your medical issues should be managed by your primary care provider. The Urgent Care does not manage chronic medical issues or refill medications. You are responsible for scheduling and keeping any necessary follow-up visits with your primary care  provider after this visit today.   --------------------------------------------------------------------------------------------------------------  IF YOU WERE PRESCRIBED AN ANTIBIOTIC TODAY: We recommend taking an over-the-counter probiotic (Such as Florajen 3 for adults, or [id number] for children -- AVAILABLE IN THE [facility] and other local pharmacies too) once a day for the entire duration of your antibiotics, and continuing it for 2 weeks after the antibiotics are finished. This will help reduce your chance of developing antibiotic-related diarrhea and/or yeast infections.  --------------------------------------------------------------------------------------------------------------           right breast tissue, left axilla 3 sentinel lymph nodes, rest of left axillary content

## 2023-01-17 PROBLEM — Z00.00 ENCOUNTER FOR PREVENTIVE HEALTH EXAMINATION: Noted: 2023-01-17

## 2023-01-18 ENCOUNTER — APPOINTMENT (OUTPATIENT)
Dept: HEART AND VASCULAR | Facility: CLINIC | Age: 63
End: 2023-01-18
Payer: MEDICAID

## 2023-01-18 ENCOUNTER — APPOINTMENT (OUTPATIENT)
Dept: HEART AND VASCULAR | Facility: CLINIC | Age: 63
End: 2023-01-18

## 2023-01-18 ENCOUNTER — NON-APPOINTMENT (OUTPATIENT)
Age: 63
End: 2023-01-18

## 2023-01-18 VITALS
OXYGEN SATURATION: 99 % | WEIGHT: 137.99 LBS | HEIGHT: 67 IN | DIASTOLIC BLOOD PRESSURE: 72 MMHG | BODY MASS INDEX: 21.66 KG/M2 | HEART RATE: 64 BPM | TEMPERATURE: 98.8 F | SYSTOLIC BLOOD PRESSURE: 140 MMHG

## 2023-01-18 DIAGNOSIS — R63.4 ABNORMAL WEIGHT LOSS: ICD-10-CM

## 2023-01-18 PROCEDURE — 99214 OFFICE O/P EST MOD 30 MIN: CPT | Mod: 25

## 2023-01-18 PROCEDURE — 36415 COLL VENOUS BLD VENIPUNCTURE: CPT

## 2023-01-18 PROCEDURE — 93000 ELECTROCARDIOGRAM COMPLETE: CPT

## 2023-01-19 LAB
ALBUMIN SERPL ELPH-MCNC: 4 G/DL
ALP BLD-CCNC: 51 U/L
ALT SERPL-CCNC: 15 U/L
ANION GAP SERPL CALC-SCNC: 8 MMOL/L
AST SERPL-CCNC: 19 U/L
BASOPHILS # BLD AUTO: 0.03 K/UL
BASOPHILS NFR BLD AUTO: 0.8 %
BILIRUB SERPL-MCNC: 0.2 MG/DL
BUN SERPL-MCNC: 8 MG/DL
CALCIUM SERPL-MCNC: 9.4 MG/DL
CHLORIDE SERPL-SCNC: 110 MMOL/L
CHOLEST SERPL-MCNC: 126 MG/DL
CO2 SERPL-SCNC: 26 MMOL/L
CREAT SERPL-MCNC: 0.9 MG/DL
CREAT SPEC-SCNC: 67 MG/DL
EGFR: 73 ML/MIN/1.73M2
EOSINOPHIL # BLD AUTO: 0.04 K/UL
EOSINOPHIL NFR BLD AUTO: 1.1 %
ESTIMATED AVERAGE GLUCOSE: 114 MG/DL
GLUCOSE SERPL-MCNC: 86 MG/DL
HBA1C MFR BLD HPLC: 5.6 %
HCT VFR BLD CALC: 38.7 %
HDLC SERPL-MCNC: 65 MG/DL
HGB BLD-MCNC: 12.3 G/DL
IMM GRANULOCYTES NFR BLD AUTO: 0.3 %
LDLC SERPL CALC-MCNC: 50 MG/DL
LYMPHOCYTES # BLD AUTO: 1.7 K/UL
LYMPHOCYTES NFR BLD AUTO: 47.1 %
MAN DIFF?: NORMAL
MCHC RBC-ENTMCNC: 31.8 GM/DL
MCHC RBC-ENTMCNC: 33.2 PG
MCV RBC AUTO: 104.3 FL
MICROALBUMIN 24H UR DL<=1MG/L-MCNC: 4.8 MG/DL
MICROALBUMIN/CREAT 24H UR-RTO: 72 MG/G
MONOCYTES # BLD AUTO: 0.3 K/UL
MONOCYTES NFR BLD AUTO: 8.3 %
NEUTROPHILS # BLD AUTO: 1.53 K/UL
NEUTROPHILS NFR BLD AUTO: 42.4 %
NONHDLC SERPL-MCNC: 61 MG/DL
NT-PROBNP SERPL-MCNC: 2561 PG/ML
PLATELET # BLD AUTO: 160 K/UL
POTASSIUM SERPL-SCNC: 4.3 MMOL/L
PROT SERPL-MCNC: 6.7 G/DL
RBC # BLD: 3.71 M/UL
RBC # FLD: 13.4 %
SODIUM SERPL-SCNC: 144 MMOL/L
T4 FREE SERPL-MCNC: 1 NG/DL
TRIGL SERPL-MCNC: 53 MG/DL
TSH SERPL-ACNC: 0.87 UIU/ML
WBC # FLD AUTO: 3.61 K/UL

## 2023-01-19 NOTE — HISTORY OF PRESENT ILLNESS
[Scheduled Procedure ___] : a [unfilled] [Date of Surgery ___] : on [unfilled] [Surgeon Name ___] : surgeon: [unfilled] [FreeTextEntry1] : 62 F HTN HIV Breast Cancer s/p chemo no radiation s/p radical mastectomy with cardiomyopathy EF 20% ---> 40% no obs CAD cath 2019 HCV former smoker former drug abuser current Marijuana Smoker\par \par here for fu she is sob can barely walk one block she has no chest pain no orthopnea no pnd no leg edema \par \par ecg nsr  nsst changes 1/18/2023 \par echo 5/2019 EF 40-45% moderate MR\par \par \par

## 2023-01-19 NOTE — PHYSICAL EXAM
[Well Developed] : well developed [Well Nourished] : well nourished [No Acute Distress] : no acute distress [Normal Venous Pressure] : normal venous pressure [No Carotid Bruit] : no carotid bruit [Normal S1, S2] : normal S1, S2 [No Murmur] : no murmur [No Rub] : no rub [No Gallop] : no gallop [Clear Lung Fields] : clear lung fields [Good Air Entry] : good air entry [No Respiratory Distress] : no respiratory distress  [Soft] : abdomen soft [Non Tender] : non-tender [No Masses/organomegaly] : no masses/organomegaly [Normal Bowel Sounds] : normal bowel sounds [Normal Gait] : normal gait [No Edema] : no edema [No Cyanosis] : no cyanosis [No Clubbing] : no clubbing [No Varicosities] : no varicosities [No Rash] : no rash [No Skin Lesions] : no skin lesions [Moves all extremities] : moves all extremities [No Focal Deficits] : no focal deficits [Normal Speech] : normal speech [Alert and Oriented] : alert and oriented [Normal memory] : normal memory [General Appearance - Well Developed] : well developed [Normal Appearance] : normal appearance [Well Groomed] : well groomed [General Appearance - Well Nourished] : well nourished [No Deformities] : no deformities [General Appearance - In No Acute Distress] : no acute distress [Normal Conjunctiva] : the conjunctiva exhibited no abnormalities [Eyelids - No Xanthelasma] : the eyelids demonstrated no xanthelasmas [Normal Oral Mucosa] : normal oral mucosa [No Oral Pallor] : no oral pallor [No Oral Cyanosis] : no oral cyanosis [Normal Jugular Venous A Waves Present] : normal jugular venous A waves present [Normal Jugular Venous V Waves Present] : normal jugular venous V waves present [No Jugular Venous Han A Waves] : no jugular venous han A waves [Respiration, Rhythm And Depth] : normal respiratory rhythm and effort [Exaggerated Use Of Accessory Muscles For Inspiration] : no accessory muscle use [Auscultation Breath Sounds / Voice Sounds] : lungs were clear to auscultation bilaterally [Heart Rate And Rhythm] : heart rate and rhythm were normal [Heart Sounds] : normal S1 and S2 [Murmurs] : no murmurs present [Abdomen Soft] : soft [Abdomen Tenderness] : non-tender [Abdomen Mass (___ Cm)] : no abdominal mass palpated [Abnormal Walk] : normal gait [Gait - Sufficient For Exercise Testing] : the gait was sufficient for exercise testing [Nail Clubbing] : no clubbing of the fingernails [Cyanosis, Localized] : no localized cyanosis [Petechial Hemorrhages (___cm)] : no petechial hemorrhages [Skin Color & Pigmentation] : normal skin color and pigmentation [] : no rash [No Venous Stasis] : no venous stasis [Skin Lesions] : no skin lesions [No Skin Ulcers] : no skin ulcer [No Xanthoma] : no  xanthoma was observed [Oriented To Time, Place, And Person] : oriented to person, place, and time [Affect] : the affect was normal [Mood] : the mood was normal [No Anxiety] : not feeling anxious

## 2023-01-19 NOTE — ASSESSMENT
[FreeTextEntry1] : increase 97/101 bid pending labs \par check blood work today\par needs a repeat echo \par continue metoprolol 25 mg ramesh\par she reports taking lasix and hydralazine continue for now may stop it if i add sglt2\par fu in two weeks

## 2023-01-26 ENCOUNTER — APPOINTMENT (OUTPATIENT)
Dept: CT IMAGING | Facility: HOSPITAL | Age: 63
End: 2023-01-26
Payer: MEDICAID

## 2023-01-26 ENCOUNTER — OUTPATIENT (OUTPATIENT)
Dept: OUTPATIENT SERVICES | Facility: HOSPITAL | Age: 63
LOS: 1 days | End: 2023-01-26
Payer: COMMERCIAL

## 2023-01-26 ENCOUNTER — RESULT REVIEW (OUTPATIENT)
Age: 63
End: 2023-01-26

## 2023-01-26 DIAGNOSIS — Z98.890 OTHER SPECIFIED POSTPROCEDURAL STATES: Chronic | ICD-10-CM

## 2023-01-26 LAB — POCT ISTAT CREATININE: 1 MG/DL — SIGNIFICANT CHANGE UP (ref 0.5–1.3)

## 2023-01-26 PROCEDURE — 71260 CT THORAX DX C+: CPT | Mod: 26

## 2023-01-26 PROCEDURE — 74177 CT ABD & PELVIS W/CONTRAST: CPT | Mod: 26

## 2023-01-26 PROCEDURE — 74177 CT ABD & PELVIS W/CONTRAST: CPT

## 2023-01-26 PROCEDURE — 71260 CT THORAX DX C+: CPT

## 2023-01-26 PROCEDURE — 82565 ASSAY OF CREATININE: CPT

## 2023-01-27 ENCOUNTER — APPOINTMENT (OUTPATIENT)
Dept: HEART AND VASCULAR | Facility: CLINIC | Age: 63
End: 2023-01-27
Payer: MEDICAID

## 2023-01-27 PROCEDURE — 93306 TTE W/DOPPLER COMPLETE: CPT

## 2023-02-02 ENCOUNTER — APPOINTMENT (OUTPATIENT)
Dept: HEART AND VASCULAR | Facility: CLINIC | Age: 63
End: 2023-02-02
Payer: MEDICAID

## 2023-02-02 VITALS
SYSTOLIC BLOOD PRESSURE: 122 MMHG | HEIGHT: 67 IN | TEMPERATURE: 97.1 F | BODY MASS INDEX: 21.03 KG/M2 | HEART RATE: 70 BPM | DIASTOLIC BLOOD PRESSURE: 76 MMHG | WEIGHT: 134 LBS | OXYGEN SATURATION: 98 %

## 2023-02-02 PROCEDURE — 99214 OFFICE O/P EST MOD 30 MIN: CPT

## 2023-02-02 RX ORDER — FUROSEMIDE 40 MG/1
40 TABLET ORAL DAILY
Qty: 90 | Refills: 3 | Status: DISCONTINUED | COMMUNITY
Start: 2018-12-13 | End: 2023-02-02

## 2023-02-02 RX ORDER — HYDRALAZINE HYDROCHLORIDE 10 MG/1
10 TABLET ORAL 3 TIMES DAILY
Qty: 270 | Refills: 3 | Status: DISCONTINUED | COMMUNITY
Start: 2018-03-14 | End: 2023-02-02

## 2023-02-03 RX ORDER — DAPAGLIFLOZIN 10 MG/1
10 TABLET, FILM COATED ORAL
Qty: 90 | Refills: 2 | Status: DISCONTINUED | COMMUNITY
Start: 2023-02-02 | End: 2023-02-03

## 2023-02-03 NOTE — HISTORY OF PRESENT ILLNESS
[Scheduled Procedure ___] : a [unfilled] [Date of Surgery ___] : on [unfilled] [Surgeon Name ___] : surgeon: [unfilled] [FreeTextEntry1] : 62 F HTN HIV Breast Cancer s/p chemo no radiation s/p radical mastectomy with cardiomyopathy EF 20% ---> 40% no obs CAD cath 2019 HCV former smoker former drug abuser current Marijuana Smoker\par \par here for fu she is sob can barely walk one block she has no chest pain no orthopnea no pnd no leg edema echo done with mod to severe MR\par \par ecg nsr  nsst changes 1/18/2023 \par echo 5/2019 EF 40-45% moderate MR\par Echo EF 47% mod to severe MR\par \par \par

## 2023-02-06 ENCOUNTER — APPOINTMENT (OUTPATIENT)
Dept: HEART AND VASCULAR | Facility: CLINIC | Age: 63
End: 2023-02-06

## 2023-02-06 ENCOUNTER — APPOINTMENT (OUTPATIENT)
Dept: PULMONOLOGY | Facility: CLINIC | Age: 63
End: 2023-02-06
Payer: MEDICAID

## 2023-02-06 VITALS
HEART RATE: 69 BPM | TEMPERATURE: 97 F | BODY MASS INDEX: 20.72 KG/M2 | WEIGHT: 132 LBS | DIASTOLIC BLOOD PRESSURE: 78 MMHG | RESPIRATION RATE: 12 BRPM | HEIGHT: 67 IN | SYSTOLIC BLOOD PRESSURE: 123 MMHG | OXYGEN SATURATION: 99 %

## 2023-02-06 DIAGNOSIS — R91.8 OTHER NONSPECIFIC ABNORMAL FINDING OF LUNG FIELD: ICD-10-CM

## 2023-02-06 DIAGNOSIS — E78.00 PURE HYPERCHOLESTEROLEMIA, UNSPECIFIED: ICD-10-CM

## 2023-02-06 DIAGNOSIS — I25.10 ATHEROSCLEROTIC HEART DISEASE OF NATIVE CORONARY ARTERY W/OUT ANGINA PECTORIS: ICD-10-CM

## 2023-02-06 DIAGNOSIS — I10 ESSENTIAL (PRIMARY) HYPERTENSION: ICD-10-CM

## 2023-02-06 DIAGNOSIS — R93.89 ABNORMAL FINDINGS ON DIAGNOSTIC IMAGING OF OTHER SPECIFIED BODY STRUCTURES: ICD-10-CM

## 2023-02-06 PROCEDURE — 99205 OFFICE O/P NEW HI 60 MIN: CPT

## 2023-02-06 RX ORDER — RISPERIDONE 0.25 MG/1
0.25 TABLET, FILM COATED ORAL
Qty: 15 | Refills: 0 | Status: ACTIVE | COMMUNITY
Start: 2022-07-05

## 2023-02-06 RX ORDER — GUAIFENESIN AND DEXTROMETHORPHAN 10; 100 MG/5ML; MG/5ML
10-100 SYRUP ORAL
Qty: 473 | Refills: 0 | Status: ACTIVE | COMMUNITY
Start: 2022-08-10

## 2023-02-06 NOTE — PHYSICAL EXAM
[No Acute Distress] : no acute distress [Normal Oropharynx] : normal oropharynx [Normal Appearance] : normal appearance [No Neck Mass] : no neck mass [Normal Rate/Rhythm] : normal rate/rhythm [Normal S1, S2] : normal s1, s2 [No Murmurs] : no murmurs [No Resp Distress] : no resp distress [Clear to Auscultation Bilaterally] : clear to auscultation bilaterally [No Abnormalities] : no abnormalities [Benign] : benign [No Edema] : no edema

## 2023-02-06 NOTE — ASSESSMENT
[FreeTextEntry1] : systolic HF will stop hydralazine and lasix \par add farxiga 10 mg daily\par she was not taking entresto 97/101 consistently\par she will have a JACY to evaluate for MR \par continue metoprolol for now\par she will see pulmonary for mild lung findings on CT scan \par fu in two weeks

## 2023-02-08 PROBLEM — R91.8 MULTIPLE PULMONARY NODULES: Status: ACTIVE | Noted: 2023-02-08

## 2023-02-08 PROBLEM — R93.89 ABNORMAL CHEST CT: Status: ACTIVE | Noted: 2023-02-08

## 2023-02-08 LAB — SARS-COV-2 N GENE NPH QL NAA+PROBE: NOT DETECTED

## 2023-02-08 NOTE — HISTORY OF PRESENT ILLNESS
[Former] : former [< 20 pack-years] : < 20 pack-years [Never] : never [Current] : current [TextBox_4] : 62 F former smoker (5PY), current marijuana smoker with HIV (on Biktarvy), HTN, Breast Cancer s/p chemo 2017 no radiation s/p radical mastectomy 2018 with cardiomyopathy EF 20% ---> 40% no obs CAD cath 2019 presenting for dyspnea on exertion and abnormal CT scan findings. States that she can barely walk one block without resting, she has no associated chest pain, no orthopnea, no PND, no leg edema. Endorses sputum production without coughing, feels like she is spitting up white colored sputum a lot. Denies fevers, weight loss. She follows with Dr. King and her most recent echo demonstrated mod to severe MR. \par \par Smoking history: smokes marijuana 2-3 joints/day regularly for the past 3 years. Former cigarette smoker smoked for 5 years, quit around  2013, maybe a PPD. Former cocaine user, stopped in 1997. 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

## 2023-02-08 NOTE — END OF VISIT
[] : Resident [FreeTextEntry3] : I personally discussed this patient with the Fellow at the time of the visit. I agree with the assessment and plan as written, unless noted below. \par I was present with the Fellow during the key portions of the history and exam. I agree with the findings and plan as documented in the Fellow 's note, unless noted below. \par

## 2023-02-08 NOTE — ASSESSMENT
[FreeTextEntry1] : Data reviewed:\par Labs 1/18/23: proBNP 2661\par \par CT chest 1/ oral and IV cont 1-26-23 \par per my readL; multiple subcentimeter pulmonary nodules in the JUAN, RUL. calcified granuloma in the LLL. there are areas of centrilobular GGO ?respiratory bronchiolitis \par \par Echo 1/26/23: \par LVEF 46%, severe/grade 3 diastolic dysfunction. normal RV size and systolic function. Mod-severe MR, tricuspid AV\par EKG nsr nsst changes 1/18/2023 \par echo 5/2019 EF 40-45% moderate MR\par Echo EF 47% mod to severe MR\par \par Impression/plan: \par 1. DUNN\par Endorses DUNN for the past 2-3 months worse over the past month. Noted to have severe MR on TTE, pending a JACY, following with cardiology. Current marijuana smoking can put her at risk for obstructive lung disease. Received and finished chemo in 2017 for breast cancer, unclear which regimen she received.  \par - will perform PFTs\par - repeat CT as below\par \par 2.Abnormal CT\par centrilobular GGO suspect most likely cause to be respiratory bronchiolitis \par smoking cessation advised\par \par 3. Multiple pulmonary nodules\par repeat ct in 6 months\par \par 4. Increased sputum production\par likely 2/2 ongoing smoking vs endorsed  rhinorrhea/post nasal drip. Does not report GERD symptoms. No fevers\par - discussed marijuana smoking cessation\par - flonase OTC for rhinorrhea\par \par 5. HCM \par - COVID booster done\par - Received flu shot this season\par - will discuss pneumo vax with her next visit

## 2023-02-08 NOTE — CONSULT LETTER
[Dear  ___] : Dear  [unfilled], [Courtesy Letter:] : I had the pleasure of seeing your patient, [unfilled], in my office today. [Please see my note below.] : Please see my note below. [Referral Closing:] : Thank you very much for seeing this patient.  If you have any questions, please do not hesitate to contact me. [Sincerely,] : Sincerely, [DrCynthia  ___] : Dr. HORTON [FreeTextEntry3] : Dr. Navas

## 2023-02-09 ENCOUNTER — FORM ENCOUNTER (OUTPATIENT)
Age: 63
End: 2023-02-09

## 2023-02-10 ENCOUNTER — OUTPATIENT (OUTPATIENT)
Dept: OUTPATIENT SERVICES | Facility: HOSPITAL | Age: 63
LOS: 1 days | End: 2023-02-10
Payer: COMMERCIAL

## 2023-02-10 DIAGNOSIS — Z98.890 OTHER SPECIFIED POSTPROCEDURAL STATES: Chronic | ICD-10-CM

## 2023-02-10 DIAGNOSIS — I34.0 NONRHEUMATIC MITRAL (VALVE) INSUFFICIENCY: ICD-10-CM

## 2023-02-10 PROCEDURE — 76377 3D RENDER W/INTRP POSTPROCES: CPT | Mod: 26

## 2023-02-10 PROCEDURE — 93312 ECHO TRANSESOPHAGEAL: CPT | Mod: 26

## 2023-02-10 PROCEDURE — 93312 ECHO TRANSESOPHAGEAL: CPT

## 2023-02-10 RX ORDER — SACUBITRIL AND VALSARTAN 24; 26 MG/1; MG/1
24-26 TABLET, FILM COATED ORAL
Qty: 60 | Refills: 0 | Status: DISCONTINUED | COMMUNITY
Start: 2022-10-17 | End: 2023-02-10

## 2023-02-10 RX ORDER — SACUBITRIL AND VALSARTAN 49; 51 MG/1; MG/1
49-51 TABLET, FILM COATED ORAL
Qty: 60 | Refills: 0 | Status: DISCONTINUED | COMMUNITY
Start: 2022-10-18 | End: 2023-02-10

## 2023-02-17 ENCOUNTER — APPOINTMENT (OUTPATIENT)
Dept: PULMONOLOGY | Facility: CLINIC | Age: 63
End: 2023-02-17

## 2023-02-21 ENCOUNTER — APPOINTMENT (OUTPATIENT)
Dept: HEART AND VASCULAR | Facility: CLINIC | Age: 63
End: 2023-02-21
Payer: MEDICAID

## 2023-02-21 ENCOUNTER — NON-APPOINTMENT (OUTPATIENT)
Age: 63
End: 2023-02-21

## 2023-02-21 VITALS
WEIGHT: 134 LBS | HEART RATE: 78 BPM | HEIGHT: 67 IN | TEMPERATURE: 99 F | DIASTOLIC BLOOD PRESSURE: 78 MMHG | SYSTOLIC BLOOD PRESSURE: 122 MMHG | OXYGEN SATURATION: 98 % | BODY MASS INDEX: 21.03 KG/M2

## 2023-02-21 PROCEDURE — 36415 COLL VENOUS BLD VENIPUNCTURE: CPT

## 2023-02-21 PROCEDURE — 99214 OFFICE O/P EST MOD 30 MIN: CPT | Mod: 25

## 2023-02-21 PROCEDURE — 93000 ELECTROCARDIOGRAM COMPLETE: CPT

## 2023-02-21 RX ORDER — ASPIRIN 81 MG/1
81 TABLET, CHEWABLE ORAL
Qty: 30 | Refills: 0 | Status: ACTIVE | COMMUNITY
Start: 2023-02-07

## 2023-02-22 LAB
ANION GAP SERPL CALC-SCNC: 13 MMOL/L
BUN SERPL-MCNC: 10 MG/DL
CALCIUM SERPL-MCNC: 9.3 MG/DL
CHLORIDE SERPL-SCNC: 110 MMOL/L
CO2 SERPL-SCNC: 20 MMOL/L
CREAT SERPL-MCNC: 0.86 MG/DL
EGFR: 77 ML/MIN/1.73M2
GLUCOSE SERPL-MCNC: 115 MG/DL
NT-PROBNP SERPL-MCNC: 1523 PG/ML
POTASSIUM SERPL-SCNC: 4.2 MMOL/L
SODIUM SERPL-SCNC: 143 MMOL/L

## 2023-02-22 NOTE — PHYSICAL EXAM
[Well Developed] : well developed [Well Nourished] : well nourished [No Acute Distress] : no acute distress [Normal Venous Pressure] : normal venous pressure [No Carotid Bruit] : no carotid bruit [Normal S1, S2] : normal S1, S2 [No Murmur] : no murmur [No Rub] : no rub [No Gallop] : no gallop [Clear Lung Fields] : clear lung fields [Good Air Entry] : good air entry [No Respiratory Distress] : no respiratory distress  [Soft] : abdomen soft [Non Tender] : non-tender [No Masses/organomegaly] : no masses/organomegaly [Normal Bowel Sounds] : normal bowel sounds [Normal Gait] : normal gait [No Edema] : no edema [No Cyanosis] : no cyanosis [No Clubbing] : no clubbing [No Varicosities] : no varicosities [No Rash] : no rash [No Skin Lesions] : no skin lesions [Moves all extremities] : moves all extremities [No Focal Deficits] : no focal deficits [Normal Speech] : normal speech [Alert and Oriented] : alert and oriented [Normal memory] : normal memory [General Appearance - Well Developed] : well developed [Normal Appearance] : normal appearance [Well Groomed] : well groomed [General Appearance - Well Nourished] : well nourished [No Deformities] : no deformities [General Appearance - In No Acute Distress] : no acute distress [Normal Conjunctiva] : the conjunctiva exhibited no abnormalities [Eyelids - No Xanthelasma] : the eyelids demonstrated no xanthelasmas [Normal Oral Mucosa] : normal oral mucosa [No Oral Pallor] : no oral pallor [No Oral Cyanosis] : no oral cyanosis [Normal Jugular Venous A Waves Present] : normal jugular venous A waves present [Normal Jugular Venous V Waves Present] : normal jugular venous V waves present [No Jugular Venous Han A Waves] : no jugular venous han A waves [Respiration, Rhythm And Depth] : normal respiratory rhythm and effort [Exaggerated Use Of Accessory Muscles For Inspiration] : no accessory muscle use [Auscultation Breath Sounds / Voice Sounds] : lungs were clear to auscultation bilaterally [Heart Rate And Rhythm] : heart rate and rhythm were normal [Heart Sounds] : normal S1 and S2 [Murmurs] : no murmurs present [Abdomen Soft] : soft [Abdomen Tenderness] : non-tender [Abdomen Mass (___ Cm)] : no abdominal mass palpated [Abnormal Walk] : normal gait [Gait - Sufficient For Exercise Testing] : the gait was sufficient for exercise testing [Nail Clubbing] : no clubbing of the fingernails [Cyanosis, Localized] : no localized cyanosis [Petechial Hemorrhages (___cm)] : no petechial hemorrhages [] : no rash [Skin Color & Pigmentation] : normal skin color and pigmentation [No Venous Stasis] : no venous stasis [Skin Lesions] : no skin lesions [No Skin Ulcers] : no skin ulcer [No Xanthoma] : no  xanthoma was observed [Oriented To Time, Place, And Person] : oriented to person, place, and time [Affect] : the affect was normal [Mood] : the mood was normal [No Anxiety] : not feeling anxious

## 2023-02-22 NOTE — ASSESSMENT
[FreeTextEntry1] : systolic HF on entresto 97/101 bid and jardiance 10 mg daily\par metoprolol 25 mg daily\par check labs and if ok start aldactone 25 mg daily\par she will see structural heart for evaluation of her valve \par to see pulmonary \par fu with me in one month

## 2023-02-22 NOTE — HISTORY OF PRESENT ILLNESS
[FreeTextEntry1] : 62 F HTN HIV Breast Cancer s/p chemo no radiation s/p radical mastectomy with cardiomyopathy EF 20% ---> 40% no obs CAD cath 2019 HCV former smoker former drug abuser current Marijuana Smoker\par \par here for fu she is sob can barely walk one block she has no chest pain no orthopnea no pnd no leg edema echo done with mod to severe MR does feel better on her GDT regimen but still sob took her meds the morning of her JACY but was very nervous \par \par \par ecg nsr  nsst changes pac 1/21/2023 \par echo 5/2019 EF 40-45% moderate MR\par Echo EF 47% mod to severe MR 1/2023\par JACY mod to severe posteriorly directed MR \par \par \par  [Scheduled Procedure ___] : a [unfilled] [Date of Surgery ___] : on [unfilled] [Surgeon Name ___] : surgeon: [unfilled]

## 2023-02-27 ENCOUNTER — APPOINTMENT (OUTPATIENT)
Dept: CARDIOTHORACIC SURGERY | Facility: CLINIC | Age: 63
End: 2023-02-27
Payer: MEDICAID

## 2023-02-27 ENCOUNTER — NON-APPOINTMENT (OUTPATIENT)
Age: 63
End: 2023-02-27

## 2023-02-27 PROCEDURE — 99215 OFFICE O/P EST HI 40 MIN: CPT

## 2023-02-27 PROCEDURE — 99205 OFFICE O/P NEW HI 60 MIN: CPT

## 2023-02-28 ENCOUNTER — APPOINTMENT (OUTPATIENT)
Dept: CARDIOTHORACIC SURGERY | Facility: CLINIC | Age: 63
End: 2023-02-28
Payer: MEDICAID

## 2023-02-28 VITALS
OXYGEN SATURATION: 96 % | SYSTOLIC BLOOD PRESSURE: 138 MMHG | WEIGHT: 134 LBS | RESPIRATION RATE: 16 BRPM | TEMPERATURE: 97 F | HEIGHT: 67 IN | DIASTOLIC BLOOD PRESSURE: 76 MMHG | HEART RATE: 72 BPM | BODY MASS INDEX: 21.03 KG/M2

## 2023-02-28 PROCEDURE — 99242 OFF/OP CONSLTJ NEW/EST SF 20: CPT

## 2023-03-02 NOTE — HISTORY OF PRESENT ILLNESS
[FreeTextEntry1] : 61 yo female, POOR HISTORIAN, former smoker, former drug abuser, current marijuana smoker, with a PMHx of HIV, schizophrenia, Asthma, pulmonary nodules,  (prior ED visits/denies intubations), HTN, DMII,  right breast cancer diagnosed in 2017( right mastectomy w/tissue expander,  chemotherapy, radiation), HFrEF, cardiomyopathy and mitral valve regurgitation.  She was initially referred for surgical consult with Dr. John in April 2018 after admission for acute systolic heart failure.   3/12/18 left and right heart cath revealed non obstructive CAD, LVEF 30-35%, LVEDP 11, 3+. JACY on 3/13 revealed moderate concentric LVH, severe global hypokinesis of LV, LVEF 20-25%, moderate MR, moderate PI, pulmonary HTN with PASP 47, small pericardial effusion.At that time it was recommended to continue management of right breast cancer and treat her MR and heart failure medically. \par \par She has been followed by Dr. King on [facility]. She presents today for surgical consult with Dr. John as part of heart team approach.  She reports worsening sob/gama, can walk 1 block before having to stop. NYHA III. \par

## 2023-03-02 NOTE — PHYSICAL EXAM
[Well Developed] : well developed [Well Nourished] : well nourished [No Acute Distress] : no acute distress [Normal S1, S2] : normal S1, S2 [Clear Lung Fields] : clear lung fields [Good Air Entry] : good air entry [Soft] : abdomen soft [Non Tender] : non-tender [Normal Gait] : normal gait [No Edema] : no edema [No Rash] : no rash [Moves all extremities] : moves all extremities [No Focal Deficits] : no focal deficits [Alert and Oriented] : alert and oriented [de-identified] : +holosystolic murmur heard at apex

## 2023-03-02 NOTE — REVIEW OF SYSTEMS
[Feeling Fatigued] : feeling fatigued [SOB] : shortness of breath [Dyspnea on exertion] : dyspnea during exertion [Syncope] : syncope [Negative] : Heme/Lymph [Fever] : no fever [Headache] : no headache [Chills] : no chills [Chest Discomfort] : no chest discomfort [Lower Ext Edema] : no extremity edema [Leg Claudication] : no intermittent leg claudication [Palpitations] : no palpitations [Orthopnea] : no orthopnea [PND] : no PND [FreeTextEntry5] : see hpi

## 2023-03-02 NOTE — ASSESSMENT
[FreeTextEntry1] : 61 y/o Female, former smoker, former drug abuse, with PMHx of HTN, HIV , HCV, Breast CA 2017 (s/p Chemo, radical mastectomy, no radiation), cardiomyopathy,and nonobstructive CAD on cath 2019 presents with moderate to severe MR and severe pulmonary HTN. Patient appears clinically stable, NYHA class II. Dr. John reviewed the recent echo images and reports with the patient and discussed the case with Dr Virgen.  Given degenerative MR with reduced ejection fraction and worsening symptoms, patient has indication for intervention on the MV. Dr. John performed the STS risk calculator and quoted a 3-4% operative mortality and complication risk and discussed the STS risk factors with the patient including but not limited to death, heart attack, bleeding, stroke, kidney problems and infection. Dr. John also discussed surgical approaches, minimally invasive versus sternotomy.   Dr. John  recommends that the patient continue GDMT and be considered for mitral clip. He discussed that if the mitral clip does not correct the MR she should be referred for MVrepair/possible replacement via a sternotomy. All questions answered.\par

## 2023-03-02 NOTE — END OF VISIT
[Time Spent: ___ minutes] : I have spent [unfilled] minutes of time on the encounter. [FreeTextEntry3] : I, VINNY ESPINOSA , am scribing for and in the presence of ANILA MERRILL the following sections: History of present illness, past Medical/family/surgical/family/social history, review of systems, vital signs, physical exam and disposition.\par I personally performed the services described in the documentation, reviewed the documentation recorded by the scribe in my presence and it accurately and completely records my words and actions.\par

## 2023-03-02 NOTE — HISTORY OF PRESENT ILLNESS
[FreeTextEntry1] : 61 y/o Female, former smoker, former drug abuse, with PMHx of HTN, HIV (viral load undetectable per pt), HCV, Breast CA 2017 (s/p Chemo, radical mastectomy, no radiation), cardiomyopathy, nonobs CAD on cath 2019, and MR, referred by Dr. King, to Dr. Espinoza for further workup and evaluation of MR. \par \par JACY 2/10/23: EF 30-35%, MV mildly thickened and calcified, posterior leaflet is tethered, MG 5.5mmHg at HR 90bpm, mod-severe MR with posteriorly directed mitral regurg at /74mmHg. Severe pHTN PASP 66mmHg. \par TTE 1/27/23: LVEF 46%, thickened MV leaflets and mod-severe MR\par EKG 2/21/23: SR, PAC, QRS 80ms\par CT C/A/P 1/26/23 (no coronary eval). \par \par Patient states that she noticed worsening SOB/DUNN with 1 flight of stairs. She is also experiencing worsening fatigue x2 months. She reports that about 3 months ago - she woke up on the floor and is unsure of how. She did not go to the emergency room or see a physician for this episode and there have been no further occurrences. She quit cigarettes, etoh in 1997. She uses marijuana for appetite stimulator post chemo. She lives at home with her son who is 35y/o. She does not use a cane or walker.

## 2023-03-08 ENCOUNTER — APPOINTMENT (OUTPATIENT)
Dept: CARDIOTHORACIC SURGERY | Facility: CLINIC | Age: 63
End: 2023-03-08

## 2023-03-08 PROCEDURE — ZZZZZ: CPT | Mod: 1L

## 2023-03-13 ENCOUNTER — APPOINTMENT (OUTPATIENT)
Dept: CARDIOTHORACIC SURGERY | Facility: CLINIC | Age: 63
End: 2023-03-13

## 2023-03-13 ENCOUNTER — OUTPATIENT (OUTPATIENT)
Dept: OUTPATIENT SERVICES | Facility: HOSPITAL | Age: 63
LOS: 1 days | End: 2023-03-13
Payer: COMMERCIAL

## 2023-03-13 ENCOUNTER — TRANSCRIPTION ENCOUNTER (OUTPATIENT)
Age: 63
End: 2023-03-13

## 2023-03-13 ENCOUNTER — NON-APPOINTMENT (OUTPATIENT)
Age: 63
End: 2023-03-13

## 2023-03-13 VITALS
DIASTOLIC BLOOD PRESSURE: 65 MMHG | OXYGEN SATURATION: 100 % | HEART RATE: 62 BPM | SYSTOLIC BLOOD PRESSURE: 145 MMHG | HEIGHT: 55 IN | RESPIRATION RATE: 16 BRPM | TEMPERATURE: 98 F | WEIGHT: 136.91 LBS

## 2023-03-13 DIAGNOSIS — Z98.890 OTHER SPECIFIED POSTPROCEDURAL STATES: Chronic | ICD-10-CM

## 2023-03-13 DIAGNOSIS — I34.0 NONRHEUMATIC MITRAL (VALVE) INSUFFICIENCY: ICD-10-CM

## 2023-03-13 LAB
ALBUMIN SERPL ELPH-MCNC: 4.2 G/DL — SIGNIFICANT CHANGE UP (ref 3.3–5)
ALP SERPL-CCNC: 54 U/L — SIGNIFICANT CHANGE UP (ref 40–120)
ALT FLD-CCNC: 12 U/L — SIGNIFICANT CHANGE UP (ref 10–45)
ANION GAP SERPL CALC-SCNC: 11 MMOL/L — SIGNIFICANT CHANGE UP (ref 5–17)
APTT BLD: 31.2 SEC — SIGNIFICANT CHANGE UP (ref 27.5–35.5)
AST SERPL-CCNC: 17 U/L — SIGNIFICANT CHANGE UP (ref 10–40)
BASOPHILS # BLD AUTO: 0.04 K/UL — SIGNIFICANT CHANGE UP (ref 0–0.2)
BASOPHILS NFR BLD AUTO: 1 % — SIGNIFICANT CHANGE UP (ref 0–2)
BILIRUB SERPL-MCNC: 0.3 MG/DL — SIGNIFICANT CHANGE UP (ref 0.2–1.2)
BUN SERPL-MCNC: 12 MG/DL — SIGNIFICANT CHANGE UP (ref 7–23)
CALCIUM SERPL-MCNC: 9.6 MG/DL — SIGNIFICANT CHANGE UP (ref 8.4–10.5)
CHLORIDE SERPL-SCNC: 108 MMOL/L — SIGNIFICANT CHANGE UP (ref 96–108)
CO2 SERPL-SCNC: 24 MMOL/L — SIGNIFICANT CHANGE UP (ref 22–31)
CREAT SERPL-MCNC: 0.94 MG/DL — SIGNIFICANT CHANGE UP (ref 0.5–1.3)
EGFR: 69 ML/MIN/1.73M2 — SIGNIFICANT CHANGE UP
EOSINOPHIL # BLD AUTO: 0.05 K/UL — SIGNIFICANT CHANGE UP (ref 0–0.5)
EOSINOPHIL NFR BLD AUTO: 1.2 % — SIGNIFICANT CHANGE UP (ref 0–6)
GLUCOSE SERPL-MCNC: 67 MG/DL — LOW (ref 70–99)
HCT VFR BLD CALC: 41.7 % — SIGNIFICANT CHANGE UP (ref 34.5–45)
HGB BLD-MCNC: 13.7 G/DL — SIGNIFICANT CHANGE UP (ref 11.5–15.5)
IMM GRANULOCYTES NFR BLD AUTO: 0.2 % — SIGNIFICANT CHANGE UP (ref 0–0.9)
INR BLD: 1.03 — SIGNIFICANT CHANGE UP (ref 0.88–1.16)
LYMPHOCYTES # BLD AUTO: 1.87 K/UL — SIGNIFICANT CHANGE UP (ref 1–3.3)
LYMPHOCYTES # BLD AUTO: 44.8 % — HIGH (ref 13–44)
MCHC RBC-ENTMCNC: 32.9 GM/DL — SIGNIFICANT CHANGE UP (ref 32–36)
MCHC RBC-ENTMCNC: 33.7 PG — SIGNIFICANT CHANGE UP (ref 27–34)
MCV RBC AUTO: 102.7 FL — HIGH (ref 80–100)
MONOCYTES # BLD AUTO: 0.32 K/UL — SIGNIFICANT CHANGE UP (ref 0–0.9)
MONOCYTES NFR BLD AUTO: 7.7 % — SIGNIFICANT CHANGE UP (ref 2–14)
NEUTROPHILS # BLD AUTO: 1.88 K/UL — SIGNIFICANT CHANGE UP (ref 1.8–7.4)
NEUTROPHILS NFR BLD AUTO: 45.1 % — SIGNIFICANT CHANGE UP (ref 43–77)
NRBC # BLD: 0 /100 WBCS — SIGNIFICANT CHANGE UP (ref 0–0)
NT-PROBNP SERPL-SCNC: 2043 PG/ML — HIGH (ref 0–300)
PLATELET # BLD AUTO: 177 K/UL — SIGNIFICANT CHANGE UP (ref 150–400)
POTASSIUM SERPL-MCNC: 3.9 MMOL/L — SIGNIFICANT CHANGE UP (ref 3.5–5.3)
POTASSIUM SERPL-SCNC: 3.9 MMOL/L — SIGNIFICANT CHANGE UP (ref 3.5–5.3)
PROT SERPL-MCNC: 7.1 G/DL — SIGNIFICANT CHANGE UP (ref 6–8.3)
PROTHROM AB SERPL-ACNC: 12.3 SEC — SIGNIFICANT CHANGE UP (ref 10.5–13.4)
RBC # BLD: 4.06 M/UL — SIGNIFICANT CHANGE UP (ref 3.8–5.2)
RBC # FLD: 13.5 % — SIGNIFICANT CHANGE UP (ref 10.3–14.5)
SODIUM SERPL-SCNC: 143 MMOL/L — SIGNIFICANT CHANGE UP (ref 135–145)
WBC # BLD: 4.17 K/UL — SIGNIFICANT CHANGE UP (ref 3.8–10.5)
WBC # FLD AUTO: 4.17 K/UL — SIGNIFICANT CHANGE UP (ref 3.8–10.5)

## 2023-03-13 PROCEDURE — 86900 BLOOD TYPING SEROLOGIC ABO: CPT

## 2023-03-13 PROCEDURE — 83880 ASSAY OF NATRIURETIC PEPTIDE: CPT

## 2023-03-13 PROCEDURE — 85730 THROMBOPLASTIN TIME PARTIAL: CPT

## 2023-03-13 PROCEDURE — 85610 PROTHROMBIN TIME: CPT

## 2023-03-13 PROCEDURE — 86850 RBC ANTIBODY SCREEN: CPT

## 2023-03-13 PROCEDURE — 36415 COLL VENOUS BLD VENIPUNCTURE: CPT

## 2023-03-13 PROCEDURE — 85025 COMPLETE CBC W/AUTO DIFF WBC: CPT

## 2023-03-13 PROCEDURE — 80053 COMPREHEN METABOLIC PANEL: CPT

## 2023-03-13 PROCEDURE — 86901 BLOOD TYPING SEROLOGIC RH(D): CPT

## 2023-03-13 NOTE — PRE-OP CHECKLIST - SELECT TESTS ORDERED
ESOPAGEAL ECHO, TTE,/EKG ESOPAGEAL ECHO, TTE, BS - 72/CBC/CMP/PT/PTT/INR/Type and Screen/EKG/POCT Blood Glucose/COVID-19

## 2023-03-13 NOTE — PATIENT PROFILE ADULT - FALL HARM RISK - UNIVERSAL INTERVENTIONS
Bed in lowest position, wheels locked, appropriate side rails in place/Call bell, personal items and telephone in reach/Instruct patient to call for assistance before getting out of bed or chair/Non-slip footwear when patient is out of bed/[place] to call system/Physically safe environment - no spills, clutter or unnecessary equipment/Purposeful Proactive Rounding/Room/bathroom lighting operational, light cord in reach

## 2023-03-13 NOTE — PRE-OP CHECKLIST - DENTURES
[FreeTextEntry1] : MARY RICHARDS is a 34 year old male hx gout on allopurinol who presents for consultation for left renal colic secondary to obstructing left ureteral stone, now resolved as per US 1/2022\par \par Plan: \par hydration\par for stone prevention cont fu w pcp re gout,cont allopurinol\par stone nutritional prevention\par f/u prn \par \par Stone nutritional counseling given--First and foremost, the most important recommendation is to increase water intake. I have recommended that he drink enough water to produce 2.5L of urine per day. More easily put, I have recommended the patient consume enough water to keep His urine clear. I have also recommended adding crystal light (or lemon) which contains citrate which prevents stone formation. I have also stressed the importance of minimizing salt and animal protein in the diet.\par  no

## 2023-03-13 NOTE — PATIENT PROFILE ADULT - HAS THE PATIENT RECEIVED THE INFLUENZA VACCINE THIS SEASON?
yes... Electrodesiccation Text: The wound bed was treated with electrodesiccation after the biopsy was performed.

## 2023-03-13 NOTE — PRE-OP CHECKLIST - 3.
Pt took Biktarvy 200/25mg, Valacyclovir 1 gm, Metoprolol tart 25mg and Aspirin 81 mg this morning @ 0730 with a sip of water

## 2023-03-13 NOTE — PATIENT PROFILE ADULT - PATIENT'S SEXUAL ORIENTATION
[facility] Care Coordination Contact    Attempted to reach member again 12/09 for six month assessment. Message left using [employer]  requesting a call back.  Faby Sears RN  [facility] Care Coordinator  [phone]     Heterosexual

## 2023-03-14 ENCOUNTER — INPATIENT (INPATIENT)
Facility: HOSPITAL | Age: 63
LOS: 0 days | Discharge: HOME HEALTH | DRG: 267 | End: 2023-03-15
Attending: INTERNAL MEDICINE | Admitting: INTERNAL MEDICINE
Payer: COMMERCIAL

## 2023-03-14 ENCOUNTER — APPOINTMENT (OUTPATIENT)
Dept: CARDIOTHORACIC SURGERY | Facility: HOSPITAL | Age: 63
End: 2023-03-14

## 2023-03-14 DIAGNOSIS — Z98.890 OTHER SPECIFIED POSTPROCEDURAL STATES: Chronic | ICD-10-CM

## 2023-03-14 DIAGNOSIS — Z00.6 ENCOUNTER FOR EXAMINATION FOR NORMAL COMPARISON AND CONTROL IN CLINICAL RESEARCH PROGRAM: ICD-10-CM

## 2023-03-14 DIAGNOSIS — B19.20 UNSPECIFIED VIRAL HEPATITIS C WITHOUT HEPATIC COMA: ICD-10-CM

## 2023-03-14 DIAGNOSIS — I50.42 CHRONIC COMBINED SYSTOLIC (CONGESTIVE) AND DIASTOLIC (CONGESTIVE) HEART FAILURE: ICD-10-CM

## 2023-03-14 LAB
A1C WITH ESTIMATED AVERAGE GLUCOSE RESULT: 5.3 % — SIGNIFICANT CHANGE UP (ref 4–5.6)
ALBUMIN SERPL ELPH-MCNC: 3.9 G/DL — SIGNIFICANT CHANGE UP (ref 3.3–5)
ALBUMIN SERPL ELPH-MCNC: 3.9 G/DL — SIGNIFICANT CHANGE UP (ref 3.3–5)
ALP SERPL-CCNC: 58 U/L — SIGNIFICANT CHANGE UP (ref 40–120)
ALP SERPL-CCNC: 68 U/L — SIGNIFICANT CHANGE UP (ref 40–120)
ALT FLD-CCNC: 10 U/L — SIGNIFICANT CHANGE UP (ref 10–45)
ALT FLD-CCNC: 12 U/L — SIGNIFICANT CHANGE UP (ref 10–45)
ANION GAP SERPL CALC-SCNC: 13 MMOL/L — SIGNIFICANT CHANGE UP (ref 5–17)
ANION GAP SERPL CALC-SCNC: 7 MMOL/L — SIGNIFICANT CHANGE UP (ref 5–17)
APTT BLD: 31.9 SEC — SIGNIFICANT CHANGE UP (ref 27.5–35.5)
APTT BLD: 32.5 SEC — SIGNIFICANT CHANGE UP (ref 27.5–35.5)
AST SERPL-CCNC: 18 U/L — SIGNIFICANT CHANGE UP (ref 10–40)
AST SERPL-CCNC: 18 U/L — SIGNIFICANT CHANGE UP (ref 10–40)
BASOPHILS # BLD AUTO: 0.03 K/UL — SIGNIFICANT CHANGE UP (ref 0–0.2)
BASOPHILS NFR BLD AUTO: 0.5 % — SIGNIFICANT CHANGE UP (ref 0–2)
BILIRUB SERPL-MCNC: 0.2 MG/DL — SIGNIFICANT CHANGE UP (ref 0.2–1.2)
BILIRUB SERPL-MCNC: 0.4 MG/DL — SIGNIFICANT CHANGE UP (ref 0.2–1.2)
BLD GP AB SCN SERPL QL: NEGATIVE — SIGNIFICANT CHANGE UP
BUN SERPL-MCNC: 10 MG/DL — SIGNIFICANT CHANGE UP (ref 7–23)
BUN SERPL-MCNC: 13 MG/DL — SIGNIFICANT CHANGE UP (ref 7–23)
CALCIUM SERPL-MCNC: 9 MG/DL — SIGNIFICANT CHANGE UP (ref 8.4–10.5)
CALCIUM SERPL-MCNC: 9.2 MG/DL — SIGNIFICANT CHANGE UP (ref 8.4–10.5)
CHLORIDE SERPL-SCNC: 107 MMOL/L — SIGNIFICANT CHANGE UP (ref 96–108)
CHLORIDE SERPL-SCNC: 110 MMOL/L — HIGH (ref 96–108)
CK SERPL-CCNC: 86 U/L — SIGNIFICANT CHANGE UP (ref 25–170)
CO2 SERPL-SCNC: 19 MMOL/L — LOW (ref 22–31)
CO2 SERPL-SCNC: 24 MMOL/L — SIGNIFICANT CHANGE UP (ref 22–31)
CREAT SERPL-MCNC: 0.73 MG/DL — SIGNIFICANT CHANGE UP (ref 0.5–1.3)
CREAT SERPL-MCNC: 0.92 MG/DL — SIGNIFICANT CHANGE UP (ref 0.5–1.3)
EGFR: 71 ML/MIN/1.73M2 — SIGNIFICANT CHANGE UP
EGFR: 94 ML/MIN/1.73M2 — SIGNIFICANT CHANGE UP
EOSINOPHIL # BLD AUTO: 0.13 K/UL — SIGNIFICANT CHANGE UP (ref 0–0.5)
EOSINOPHIL NFR BLD AUTO: 2.2 % — SIGNIFICANT CHANGE UP (ref 0–6)
ESTIMATED AVERAGE GLUCOSE: 105 MG/DL — SIGNIFICANT CHANGE UP (ref 68–114)
GAS PNL BLDA: SIGNIFICANT CHANGE UP
GLUCOSE BLDC GLUCOMTR-MCNC: 72 MG/DL — SIGNIFICANT CHANGE UP (ref 70–99)
GLUCOSE SERPL-MCNC: 130 MG/DL — HIGH (ref 70–99)
GLUCOSE SERPL-MCNC: 76 MG/DL — SIGNIFICANT CHANGE UP (ref 70–99)
HCT VFR BLD CALC: 43.1 % — SIGNIFICANT CHANGE UP (ref 34.5–45)
HCT VFR BLD CALC: 43.3 % — SIGNIFICANT CHANGE UP (ref 34.5–45)
HGB BLD-MCNC: 13.9 G/DL — SIGNIFICANT CHANGE UP (ref 11.5–15.5)
HGB BLD-MCNC: 14.4 G/DL — SIGNIFICANT CHANGE UP (ref 11.5–15.5)
IMM GRANULOCYTES NFR BLD AUTO: 0.5 % — SIGNIFICANT CHANGE UP (ref 0–0.9)
INR BLD: 1 — SIGNIFICANT CHANGE UP (ref 0.88–1.16)
INR BLD: 1.12 — SIGNIFICANT CHANGE UP (ref 0.88–1.16)
LYMPHOCYTES # BLD AUTO: 2.47 K/UL — SIGNIFICANT CHANGE UP (ref 1–3.3)
LYMPHOCYTES # BLD AUTO: 42.1 % — SIGNIFICANT CHANGE UP (ref 13–44)
MAGNESIUM SERPL-MCNC: 1.9 MG/DL — SIGNIFICANT CHANGE UP (ref 1.6–2.6)
MCHC RBC-ENTMCNC: 32.3 GM/DL — SIGNIFICANT CHANGE UP (ref 32–36)
MCHC RBC-ENTMCNC: 33.3 GM/DL — SIGNIFICANT CHANGE UP (ref 32–36)
MCHC RBC-ENTMCNC: 33.5 PG — SIGNIFICANT CHANGE UP (ref 27–34)
MCHC RBC-ENTMCNC: 33.6 PG — SIGNIFICANT CHANGE UP (ref 27–34)
MCV RBC AUTO: 100.7 FL — HIGH (ref 80–100)
MCV RBC AUTO: 104.1 FL — HIGH (ref 80–100)
MONOCYTES # BLD AUTO: 0.31 K/UL — SIGNIFICANT CHANGE UP (ref 0–0.9)
MONOCYTES NFR BLD AUTO: 5.3 % — SIGNIFICANT CHANGE UP (ref 2–14)
NEUTROPHILS # BLD AUTO: 2.9 K/UL — SIGNIFICANT CHANGE UP (ref 1.8–7.4)
NEUTROPHILS NFR BLD AUTO: 49.4 % — SIGNIFICANT CHANGE UP (ref 43–77)
NRBC # BLD: 0 /100 WBCS — SIGNIFICANT CHANGE UP (ref 0–0)
NRBC # BLD: 0 /100 WBCS — SIGNIFICANT CHANGE UP (ref 0–0)
PHOSPHATE SERPL-MCNC: 3.3 MG/DL — SIGNIFICANT CHANGE UP (ref 2.5–4.5)
PLATELET # BLD AUTO: 158 K/UL — SIGNIFICANT CHANGE UP (ref 150–400)
PLATELET # BLD AUTO: 164 K/UL — SIGNIFICANT CHANGE UP (ref 150–400)
POTASSIUM SERPL-MCNC: 3.3 MMOL/L — LOW (ref 3.5–5.3)
POTASSIUM SERPL-MCNC: 3.8 MMOL/L — SIGNIFICANT CHANGE UP (ref 3.5–5.3)
POTASSIUM SERPL-SCNC: 3.3 MMOL/L — LOW (ref 3.5–5.3)
POTASSIUM SERPL-SCNC: 3.8 MMOL/L — SIGNIFICANT CHANGE UP (ref 3.5–5.3)
PROT SERPL-MCNC: 6.7 G/DL — SIGNIFICANT CHANGE UP (ref 6–8.3)
PROT SERPL-MCNC: 7.3 G/DL — SIGNIFICANT CHANGE UP (ref 6–8.3)
PROTHROM AB SERPL-ACNC: 11.9 SEC — SIGNIFICANT CHANGE UP (ref 10.5–13.4)
PROTHROM AB SERPL-ACNC: 13.3 SEC — SIGNIFICANT CHANGE UP (ref 10.5–13.4)
RBC # BLD: 4.14 M/UL — SIGNIFICANT CHANGE UP (ref 3.8–5.2)
RBC # BLD: 4.3 M/UL — SIGNIFICANT CHANGE UP (ref 3.8–5.2)
RBC # FLD: 13.5 % — SIGNIFICANT CHANGE UP (ref 10.3–14.5)
RBC # FLD: 13.6 % — SIGNIFICANT CHANGE UP (ref 10.3–14.5)
RH IG SCN BLD-IMP: POSITIVE — SIGNIFICANT CHANGE UP
SODIUM SERPL-SCNC: 139 MMOL/L — SIGNIFICANT CHANGE UP (ref 135–145)
SODIUM SERPL-SCNC: 141 MMOL/L — SIGNIFICANT CHANGE UP (ref 135–145)
TSH SERPL-MCNC: 2.78 UIU/ML — SIGNIFICANT CHANGE UP (ref 0.27–4.2)
WBC # BLD: 3.04 K/UL — LOW (ref 3.8–10.5)
WBC # BLD: 5.87 K/UL — SIGNIFICANT CHANGE UP (ref 3.8–10.5)
WBC # FLD AUTO: 3.04 K/UL — LOW (ref 3.8–10.5)
WBC # FLD AUTO: 5.87 K/UL — SIGNIFICANT CHANGE UP (ref 3.8–10.5)

## 2023-03-14 PROCEDURE — 93010 ELECTROCARDIOGRAM REPORT: CPT

## 2023-03-14 PROCEDURE — 71045 X-RAY EXAM CHEST 1 VIEW: CPT | Mod: 26

## 2023-03-14 PROCEDURE — 33418 REPAIR TCAT MITRAL VALVE: CPT | Mod: 62,Q0

## 2023-03-14 PROCEDURE — 33418 REPAIR TCAT MITRAL VALVE: CPT | Mod: Q0

## 2023-03-14 DEVICE — IMPLANTABLE DEVICE: Type: IMPLANTABLE DEVICE | Status: FUNCTIONAL

## 2023-03-14 DEVICE — KIT VERSACROSS PIGTAIL .035IN 45 DEG D1 230CM: Type: IMPLANTABLE DEVICE | Status: FUNCTIONAL

## 2023-03-14 DEVICE — SHEATH INTRODUCER TERUMO PINNACLE CORONARY 8FR X 10CM X 0.038" MINI WIRE: Type: IMPLANTABLE DEVICE | Status: FUNCTIONAL

## 2023-03-14 DEVICE — GWIRE GUID  0.035INX150CM: Type: IMPLANTABLE DEVICE | Status: FUNCTIONAL

## 2023-03-14 DEVICE — SYS IMP PASCAL ACE G180266 STUDY ONLY: Type: IMPLANTABLE DEVICE | Status: FUNCTIONAL

## 2023-03-14 DEVICE — INTRO MICROPUNC 4FRX10CM SS: Type: IMPLANTABLE DEVICE | Status: FUNCTIONAL

## 2023-03-14 RX ORDER — HEPARIN SODIUM 5000 [USP'U]/ML
5000 INJECTION INTRAVENOUS; SUBCUTANEOUS EVERY 8 HOURS
Refills: 0 | Status: DISCONTINUED | OUTPATIENT
Start: 2023-03-14 | End: 2023-03-15

## 2023-03-14 RX ORDER — RISPERIDONE 4 MG/1
0.25 TABLET ORAL DAILY
Refills: 0 | Status: DISCONTINUED | OUTPATIENT
Start: 2023-03-14 | End: 2023-03-14

## 2023-03-14 RX ORDER — LAMIVUDINE AND ZIDOVUDINE 150; 300 MG/1; MG/1
1 TABLET, FILM COATED ORAL
Qty: 0 | Refills: 0 | DISCHARGE

## 2023-03-14 RX ORDER — VALACYCLOVIR 500 MG/1
1000 TABLET, FILM COATED ORAL ONCE
Refills: 0 | Status: COMPLETED | OUTPATIENT
Start: 2023-03-14 | End: 2023-03-14

## 2023-03-14 RX ORDER — METOPROLOL TARTRATE 50 MG
25 TABLET ORAL DAILY
Refills: 0 | Status: DISCONTINUED | OUTPATIENT
Start: 2023-03-15 | End: 2023-03-15

## 2023-03-14 RX ORDER — CEFAZOLIN SODIUM 1 G
2000 VIAL (EA) INJECTION EVERY 8 HOURS
Refills: 0 | Status: DISCONTINUED | OUTPATIENT
Start: 2023-03-14 | End: 2023-03-15

## 2023-03-14 RX ORDER — POTASSIUM CHLORIDE 20 MEQ
40 PACKET (EA) ORAL EVERY 4 HOURS
Refills: 0 | Status: COMPLETED | OUTPATIENT
Start: 2023-03-14 | End: 2023-03-14

## 2023-03-14 RX ORDER — DEXMEDETOMIDINE HYDROCHLORIDE IN 0.9% SODIUM CHLORIDE 4 UG/ML
0.4 INJECTION INTRAVENOUS
Qty: 400 | Refills: 0 | Status: DISCONTINUED | OUTPATIENT
Start: 2023-03-14 | End: 2023-03-15

## 2023-03-14 RX ORDER — METFORMIN HYDROCHLORIDE 850 MG/1
1 TABLET ORAL
Qty: 0 | Refills: 0 | DISCHARGE

## 2023-03-14 RX ORDER — RISPERIDONE 4 MG/1
0.25 TABLET ORAL DAILY
Refills: 0 | Status: DISCONTINUED | OUTPATIENT
Start: 2023-03-14 | End: 2023-03-15

## 2023-03-14 RX ORDER — INSULIN LISPRO 100/ML
VIAL (ML) SUBCUTANEOUS
Refills: 0 | Status: DISCONTINUED | OUTPATIENT
Start: 2023-03-14 | End: 2023-03-15

## 2023-03-14 RX ORDER — DEXTROSE 50 % IN WATER 50 %
25 SYRINGE (ML) INTRAVENOUS ONCE
Refills: 0 | Status: DISCONTINUED | OUTPATIENT
Start: 2023-03-14 | End: 2023-03-15

## 2023-03-14 RX ORDER — ATORVASTATIN CALCIUM 80 MG/1
40 TABLET, FILM COATED ORAL AT BEDTIME
Refills: 0 | Status: DISCONTINUED | OUTPATIENT
Start: 2023-03-14 | End: 2023-03-14

## 2023-03-14 RX ORDER — SACUBITRIL AND VALSARTAN 24; 26 MG/1; MG/1
1 TABLET, FILM COATED ORAL
Refills: 0 | Status: DISCONTINUED | OUTPATIENT
Start: 2023-03-14 | End: 2023-03-15

## 2023-03-14 RX ORDER — GABAPENTIN 400 MG/1
400 CAPSULE ORAL THREE TIMES A DAY
Refills: 0 | Status: DISCONTINUED | OUTPATIENT
Start: 2023-03-14 | End: 2023-03-14

## 2023-03-14 RX ORDER — ASPIRIN/CALCIUM CARB/MAGNESIUM 324 MG
81 TABLET ORAL DAILY
Refills: 0 | Status: DISCONTINUED | OUTPATIENT
Start: 2023-03-14 | End: 2023-03-14

## 2023-03-14 RX ORDER — DEXTROSE 50 % IN WATER 50 %
12.5 SYRINGE (ML) INTRAVENOUS ONCE
Refills: 0 | Status: DISCONTINUED | OUTPATIENT
Start: 2023-03-14 | End: 2023-03-15

## 2023-03-14 RX ORDER — DEXTROSE 50 % IN WATER 50 %
15 SYRINGE (ML) INTRAVENOUS ONCE
Refills: 0 | Status: DISCONTINUED | OUTPATIENT
Start: 2023-03-14 | End: 2023-03-15

## 2023-03-14 RX ORDER — ALBUTEROL 90 UG/1
2 AEROSOL, METERED ORAL
Qty: 0 | Refills: 0 | DISCHARGE

## 2023-03-14 RX ORDER — SODIUM CHLORIDE 9 MG/ML
1000 INJECTION, SOLUTION INTRAVENOUS
Refills: 0 | Status: DISCONTINUED | OUTPATIENT
Start: 2023-03-14 | End: 2023-03-15

## 2023-03-14 RX ORDER — CHLORHEXIDINE GLUCONATE 213 G/1000ML
5 SOLUTION TOPICAL
Refills: 0 | Status: DISCONTINUED | OUTPATIENT
Start: 2023-03-14 | End: 2023-03-15

## 2023-03-14 RX ORDER — GABAPENTIN 400 MG/1
300 CAPSULE ORAL THREE TIMES A DAY
Refills: 0 | Status: DISCONTINUED | OUTPATIENT
Start: 2023-03-14 | End: 2023-03-15

## 2023-03-14 RX ORDER — ONDANSETRON 8 MG/1
4 TABLET, FILM COATED ORAL ONCE
Refills: 0 | Status: DISCONTINUED | OUTPATIENT
Start: 2023-03-14 | End: 2023-03-14

## 2023-03-14 RX ORDER — GLUCAGON INJECTION, SOLUTION 0.5 MG/.1ML
1 INJECTION, SOLUTION SUBCUTANEOUS ONCE
Refills: 0 | Status: DISCONTINUED | OUTPATIENT
Start: 2023-03-14 | End: 2023-03-15

## 2023-03-14 RX ORDER — BICTEGRAVIR SODIUM, EMTRICITABINE, AND TENOFOVIR ALAFENAMIDE FUMARATE 30; 120; 15 MG/1; MG/1; MG/1
1 TABLET ORAL DAILY
Refills: 0 | Status: DISCONTINUED | OUTPATIENT
Start: 2023-03-14 | End: 2023-03-14

## 2023-03-14 RX ORDER — MEPERIDINE HYDROCHLORIDE 50 MG/ML
25 INJECTION INTRAMUSCULAR; INTRAVENOUS; SUBCUTANEOUS ONCE
Refills: 0 | Status: DISCONTINUED | OUTPATIENT
Start: 2023-03-14 | End: 2023-03-14

## 2023-03-14 RX ORDER — ASPIRIN/CALCIUM CARB/MAGNESIUM 324 MG
81 TABLET ORAL DAILY
Refills: 0 | Status: DISCONTINUED | OUTPATIENT
Start: 2023-03-15 | End: 2023-03-15

## 2023-03-14 RX ORDER — SPIRONOLACTONE 25 MG/1
25 TABLET, FILM COATED ORAL DAILY
Refills: 0 | Status: DISCONTINUED | OUTPATIENT
Start: 2023-03-14 | End: 2023-03-15

## 2023-03-14 RX ORDER — VALACYCLOVIR 500 MG/1
1000 TABLET, FILM COATED ORAL
Refills: 0 | Status: DISCONTINUED | OUTPATIENT
Start: 2023-03-14 | End: 2023-03-14

## 2023-03-14 RX ORDER — SODIUM CHLORIDE 9 MG/ML
1000 INJECTION INTRAMUSCULAR; INTRAVENOUS; SUBCUTANEOUS
Refills: 0 | Status: DISCONTINUED | OUTPATIENT
Start: 2023-03-14 | End: 2023-03-15

## 2023-03-14 RX ORDER — ALBUMIN HUMAN 25 %
250 VIAL (ML) INTRAVENOUS ONCE
Refills: 0 | Status: COMPLETED | OUTPATIENT
Start: 2023-03-14 | End: 2023-03-14

## 2023-03-14 RX ORDER — ACETAMINOPHEN 500 MG
1000 TABLET ORAL ONCE
Refills: 0 | Status: COMPLETED | OUTPATIENT
Start: 2023-03-14 | End: 2023-03-14

## 2023-03-14 RX ORDER — ONDANSETRON 8 MG/1
4 TABLET, FILM COATED ORAL ONCE
Refills: 0 | Status: COMPLETED | OUTPATIENT
Start: 2023-03-14 | End: 2023-03-14

## 2023-03-14 RX ORDER — PANTOPRAZOLE SODIUM 20 MG/1
40 TABLET, DELAYED RELEASE ORAL DAILY
Refills: 0 | Status: DISCONTINUED | OUTPATIENT
Start: 2023-03-14 | End: 2023-03-15

## 2023-03-14 RX ORDER — BICTEGRAVIR SODIUM, EMTRICITABINE, AND TENOFOVIR ALAFENAMIDE FUMARATE 30; 120; 15 MG/1; MG/1; MG/1
1 TABLET ORAL DAILY
Refills: 0 | Status: DISCONTINUED | OUTPATIENT
Start: 2023-03-14 | End: 2023-03-15

## 2023-03-14 RX ORDER — POTASSIUM CHLORIDE 20 MEQ
10 PACKET (EA) ORAL ONCE
Refills: 0 | Status: COMPLETED | OUTPATIENT
Start: 2023-03-14 | End: 2023-03-14

## 2023-03-14 RX ADMIN — Medication 100 MILLIEQUIVALENT(S): at 19:09

## 2023-03-14 RX ADMIN — SPIRONOLACTONE 25 MILLIGRAM(S): 25 TABLET, FILM COATED ORAL at 18:41

## 2023-03-14 RX ADMIN — Medication 400 MILLIGRAM(S): at 17:20

## 2023-03-14 RX ADMIN — ONDANSETRON 4 MILLIGRAM(S): 8 TABLET, FILM COATED ORAL at 16:00

## 2023-03-14 RX ADMIN — RISPERIDONE 0.25 MILLIGRAM(S): 4 TABLET ORAL at 18:41

## 2023-03-14 RX ADMIN — Medication 1000 MILLIGRAM(S): at 17:50

## 2023-03-14 RX ADMIN — Medication 125 MILLILITER(S): at 15:50

## 2023-03-14 RX ADMIN — HEPARIN SODIUM 5000 UNIT(S): 5000 INJECTION INTRAVENOUS; SUBCUTANEOUS at 21:07

## 2023-03-14 RX ADMIN — DEXMEDETOMIDINE HYDROCHLORIDE IN 0.9% SODIUM CHLORIDE 6.21 MICROGRAM(S)/KG/HR: 4 INJECTION INTRAVENOUS at 15:45

## 2023-03-14 RX ADMIN — GABAPENTIN 300 MILLIGRAM(S): 400 CAPSULE ORAL at 21:08

## 2023-03-14 RX ADMIN — Medication 100 MILLIGRAM(S): at 21:08

## 2023-03-14 RX ADMIN — SACUBITRIL AND VALSARTAN 1 TABLET(S): 24; 26 TABLET, FILM COATED ORAL at 18:41

## 2023-03-14 NOTE — H&P ADULT - NSHPLABSRESULTS_GEN_ALL_CORE
< from: JACY w/Doppler (02.10.23 @ 10:35) >       1. Moderately-to-severely reduced left ventricular systolic function.   2. Normal right ventricular size and systolic function.   3. The mitral valve is mildly thickened and calcified. The posteior   leaflet is tethered. Mitral annular calcification noted. The mean   transvalvular gradient is 5.50 mmHg at a heart rate of 90 bpm. The mitral   valve area estimated via planimetry is 3.81 cm². Moderate to severe   posteriorly directed mitral regurgitation at a BP of 150 / 74 mmHg.   4. No LA/RA/LUZ/RAAthrombus seen.   5. Trivial pericardial effusion.   6. There is severe pulmonary hypertension, pulmonary artery systolic   pressure is 66 mmHg.   7. There is mild non-mobile plaque seen in the visualized portion of the   descending aorta. There is mild non-mobile plaque seen in the visualized   portion of the aortic arch.      < end of copied text >

## 2023-03-14 NOTE — H&P ADULT - NSHPREVIEWOFSYSTEMS_GEN_ALL_CORE
Review of Systems  CONSTITUTIONAL:  Denies Fevers / chills, sweats, fatigue, weight loss, weight gain                                      NEURO:  Denies parethesias, seizures, syncope, confusion                                                                                EYES:  Denies Blurry vision, discharge, pain, loss of vision                                                                                    ENMT:  Denies Difficulty hearing, vertigo, dysphagia, epistaxis, recent dental work                                       CV:  +DUNN, Denies Chest pain, palpitations, orthopnea                                                                                          RESPIRATORY:  Denies Wheezing, SOB, cough / sputum, hemoptysis                                                                GI:  Denies Nausea, vomiting, diarrhea, constipation, melena, difficulty swallowing                                               : Denies Hematuria, dysuria, urgency, incontinence                                                                                         MUSCULOSKELETAL:  Denies arthritis, joint swelling, muscle weakness                                                             SKIN/BREAST:  Denies rash, itching, hair loss, masses                                                                                            PSYCH:  Denies depression, anxiety, suicidal ideation                                                                                               HEME/LYMPH:  Denies bruises easily, enlarged lymph nodes, tender lymph nodes                                        ENDOCRINE:  Denies cold intolerance, heat intolerance, polydipsia

## 2023-03-14 NOTE — H&P ADULT - NSICDXPASTSURGICALHX_GEN_ALL_CORE_FT
PAST SURGICAL HISTORY:  Delivered by  section x3    H/O mastectomy, right     H/O umbilical hernia repair

## 2023-03-14 NOTE — PROGRESS NOTE ADULT - SUBJECTIVE AND OBJECTIVE BOX
Patient discussed on morning rounds with Dr. Espinoza    Operation / Date: 3/14/23 Mitraclip x1 EF 30%    SUBJECTIVE ASSESSMENT:  62y Female seen and examined at bedside.  Patient reports feeling hot.  Denies chest pain, shortness of breath, nausea, vomiting.        Vital Signs Last 24 Hrs  T(C): 36.4 (14 Mar 2023 11:32), Max: 36.4 (14 Mar 2023 11:32)  T(F): --  HR: 85 (14 Mar 2023 16:00) (62 - 85)  BP: 139/63 (14 Mar 2023 16:00) (139/63 - 150/67)  BP(mean): 90 (14 Mar 2023 16:00) (90 - 98)  RR: 20 (14 Mar 2023 16:00) (16 - 24)  SpO2: 100% (14 Mar 2023 16:00) (100% - 100%)    Parameters below as of 14 Mar 2023 16:00  Patient On (Oxygen Delivery Method): room air      I&O's Detail      CHEST TUBE:  No.   PUJA DRAIN:  No.  EPICARDIAL WIRES: No.  TIE DOWNS: No.  KAT: No.  MATTRESS SUTURE: Yes x2 Right groin    PHYSICAL EXAM:    GEN: NAD, looks comfortable  Psych: Mood appropriate  Neuro: A&Ox3.  No focal deficits.  Moving all extremities.   HEENT: No obvious abnormalities  CV: S1S2, regular, no murmurs appreciated.  No carotid bruits.  No JVD  Lungs: Clear B/L.  No wheezing, rales or rhonchi  ABD: Soft, non-tender, non-distended.  +Bowel sounds  EXT: Warm and well perfused.  No peripheral edema noted, Right groin soft no hematoma.  Musculoskeletal: Moving all extremities with normal ROM, no joint swelling  PV: Pedal pulses palpable    LABS:                        14.4   5.87  )-----------( 158      ( 14 Mar 2023 15:02 )             43.3       COUMADIN:  No. REASON: .    PT/INR - ( 14 Mar 2023 15:02 )   PT: 13.3 sec;   INR: 1.12          PTT - ( 14 Mar 2023 15:02 )  PTT:32.5 sec    03-14    139  |  107  |  10  ----------------------------<  130<H>  3.3<L>   |  19<L>  |  0.73    Ca    9.0      14 Mar 2023 15:02  Phos  3.3     03-14  Mg     1.9     03-14    TPro  6.7  /  Alb  3.9  /  TBili  0.4  /  DBili  x   /  AST  18  /  ALT  10  /  AlkPhos  58  03-14          MEDICATIONS  (STANDING):  albumin human  5% IVPB 250 milliLiter(s) IV Intermittent once  bictegravir 50 mG/emtricitabine 200 mG/tenofovir alafenamide 25 mG (BIKTARVY) 1 Tablet(s) Oral daily  ceFAZolin   IVPB 2000 milliGRAM(s) IV Intermittent every 8 hours  chlorhexidine 0.12% Liquid 5 milliLiter(s) Oral Mucosa two times a day  dexMEDEtomidine Infusion 0.4 MICROgram(s)/kG/Hr (6.21 mL/Hr) IV Continuous <Continuous>  dextrose 5%. 1000 milliLiter(s) (100 mL/Hr) IV Continuous <Continuous>  dextrose 5%. 1000 milliLiter(s) (50 mL/Hr) IV Continuous <Continuous>  dextrose 50% Injectable 25 Gram(s) IV Push once  dextrose 50% Injectable 12.5 Gram(s) IV Push once  dextrose 50% Injectable 25 Gram(s) IV Push once  gabapentin 300 milliGRAM(s) Oral three times a day  glucagon  Injectable 1 milliGRAM(s) IntraMuscular once  heparin   Injectable 5000 Unit(s) SubCutaneous every 8 hours  insulin lispro (ADMELOG) corrective regimen sliding scale   SubCutaneous three times a day before meals  pantoprazole  Injectable 40 milliGRAM(s) IV Push daily  risperiDONE   Tablet 0.25 milliGRAM(s) Oral daily  sacubitril 97 mG/valsartan 103 mG 1 Tablet(s) Oral two times a day  sodium chloride 0.9%. 1000 milliLiter(s) (10 mL/Hr) IV Continuous <Continuous>  spironolactone 25 milliGRAM(s) Oral daily  valACYclovir 1000 milliGRAM(s) Oral once    MEDICATIONS  (PRN):  dextrose Oral Gel 15 Gram(s) Oral once PRN Blood Glucose LESS THAN 70 milliGRAM(s)/deciliter        RADIOLOGY & ADDITIONAL TESTS:  3/14 No acute pathology

## 2023-03-14 NOTE — PRE-ANESTHESIA EVALUATION ADULT - NSANTHDISPORD_GEN_ALL_CORE
-Possibly due to noncompliance or recurrent urinary tract infections.  -Found unresponsive at home by EMS  -Labs on presentation revealed a sodium of 126.,  Potassium of 4.8, chloride of 72, bicarb of 14, anion gap of 40 , and a blood glucose of 1771 with a BUN of 113 and a creatinine of 4.24 and mag of 2.3, with elevated ammonia at 53 and a lactic acid at 4.8.  Plan   Diabetic ketoacidosis protocol with aggressive fluid resuscitation,to close anion gap.  -We will check serum and urine osmolality.  -Monitor and replete electrolytes as needed.  -Monitor kidney function and urine output with strict input /output charts   ICU

## 2023-03-14 NOTE — HISTORY OF PRESENT ILLNESS
[FreeTextEntry1] : This visit was provided via telehealth using real-time 2-way audio visual technology. The patient, LATOYA AGUILAR, was located at home, [address]\[place] [address] , at the time of the visit. The provider was located at [address] [place] [address]. The patient, LATOYA AGUILAR, Dr. Thor Espinoza and BRIAN CANO all participated in the telehealth encounter. Verbal consent given on 03/08/2023 by LATOYA AGUILAR. \par \par 61 y/o Female, former smoker, former drug abuse, with PMHx of HTN, HIV (viral load undetectable per pt), HCV, Breast CA 2017 (s/p Chemo, radical mastectomy, no radiation), cardiomyopathy, nonobs CAD on cath 2019, and MR, referred by Dr. King, to Dr. Espinoza for further workup and evaluation of MR. \par \par JACY 2/10/23: EF 30-35%, MV mildly thickened and calcified, posterior leaflet is tethered, MG 5.5mmHg at HR 90bpm, mod-severe MR with posteriorly directed mitral regurg at /74mmHg. Severe pHTN PASP 66mmHg. \par TTE 1/27/23: LVEF 46%, thickened MV leaflets and mod-severe MR\par EKG 2/21/23: SR, PAC, QRS 80ms\par CT C/A/P 1/26/23 (no coronary eval). \par \par Patient states that she noticed worsening SOB/DUNN with 1 flight of stairs. Reports no changes since last visit. She is also experiencing worsening fatigue x2 months. She quit cigarettes, etoh in 1997. She uses marijuana for appetite stimulator post chemo. She lives at home with her son who is 37y/o. She does not use a cane or walker.

## 2023-03-14 NOTE — H&P ADULT - HISTORY OF PRESENT ILLNESS
This 63 y/o Female, former smoker, former drug abuse, with PMHx of HTN, HIV (viral load undetectable per pt), HCV, Breast CA 2017 (s/p Chemo, radical mastectomy, no radiation), cardiomyopathy, nonobs CAD on cath 2019, and MR, referred by Dr. King, to Dr. Espinoza for further workup and evaluation of MR. Patient states that she noticed worsening SOB/DUNN with 1 flight of stairs. She is also experiencing worsening fatigue x2 months. She reports that about 3 months ago -he woke up on the floor and is unsure of how. She did not go to the emergency room or see a physician for this episode and there have been no further occurrences. She quit cigarettes, etoh in 1997. She uses marijuana for appetite stimulator post chemo. She lives at home with her son who is 35y/o. She does not use a cane or walker. Patient seen in same day holding area; Reports no changes to PMHx or medications since last seen by our team. Denies acute or current SOB, chest pain, palpitation, N/V/D, fever/chills, recent illness, or any other concerning symptoms.

## 2023-03-14 NOTE — H&P ADULT - NSHPPHYSICALEXAM_GEN_ALL_CORE
Vital Signs Last 24 Hrs  T(C): 36.4 (13 Mar 2023 13:10), Max: 36.4 (13 Mar 2023 13:10)  T(F): 97.6 (13 Mar 2023 13:10), Max: 97.6 (13 Mar 2023 13:10)  HR: 62 (13 Mar 2023 13:10) (62 - 62)  BP: 145/65 (13 Mar 2023 13:10) (145/65 - 145/65)  BP(mean): --  RR: 16 (13 Mar 2023 13:10) (16 - 16)  SpO2: 100% (13 Mar 2023 13:10) (100% - 100%)    GEN: NAD, looks comfortable  Psych: Mood appropriate  Neuro: A&Ox3.  No focal deficits.  Moving all extremities.   HEENT: No obvious abnormalities  CV: S1S2, regular, + murmurs appreciated.  No carotid bruits.  No JVD  Lungs: Clear B/L.  No wheezing, rales or rhonchi  ABD: Soft, non-tender, non-distended.  +Bowel sounds  EXT: Warm and well perfused.  No peripheral edema noted  Musculoskeletal: Moving all extremities with normal ROM, no joint swelling  PV: Pedal pulses palpable

## 2023-03-14 NOTE — PROGRESS NOTE ADULT - ASSESSMENT
This 61 y/o Female, former smoker, former drug abuse, with PMHx of HTN, HIV (viral load undetectable per pt), HCV, Breast CA 2017 (s/p Chemo, radical mastectomy, no radiation), cardiomyopathy, nonobs CAD on cath 2019, and MR, referred by Dr. King, to Dr. Espinoza for further workup and evaluation of MR. Patient states that she noticed worsening SOB/DUNN with 1 flight of stairs. She is also experiencing worsening fatigue x2 months. She reports that about 3 months ago -he woke up on the floor and is unsure of how. She did not go to the emergency room or see a physician for this episode and there have been no further occurrences. She quit cigarettes, etoh in 1997. She uses marijuana for appetite stimulator post chemo.   On 3/14/23 patient underwent mitraclip x1.  Patient brought to [facility] as mini-ICU.    ==== Neurovascular ====  -No delirium, pain well managed on current regimen  -C/w PRNs for Pain control  -Monitor neuro status    ==== Respiratory ====  -Saturates well on RA     -AM CXR stable, repeat in AM  -Encourage IS 10x/hour while awake, Cough and deep breathing exercises  -Monitor respiratory status via SpO2  -Continue risperodone    ==== Cardiovascular ====  Monitor on Tele  Continue aspirin 81mg QD  Continue Statin  Continue Toprol 25mg QD  Continue Entresto and spironolactone    ==== GI ====   -Tolerating PO  -Prophylaxis: Protonix  -C/w bowel regimen    ==== /Renal ====  -BUN/Cr: 10/0.73  -Trend Cr on AM labs  -Replete electrolytes as needed    ==== ID ====   Afebrile, asymptomatic  -WBC: 5.87  -Continue to monitor for SIRS/Sepsis syndrome while inpatient    ==== Endocrine ====   -A1C: pending, no h/o DM  -TSH: pending, no h/o thyroid disease     ==== Hematologic ====   -H/H: 14/43  -CBC, chem in AM  -DVT ppx: HSQ 5000u q8h and SCDs    ==== Disposition Planning ====  Telemetry

## 2023-03-14 NOTE — H&P ADULT - NSICDXPASTMEDICALHX_GEN_ALL_CORE_FT
PAST MEDICAL HISTORY:  Breast cancer right, s/p chemo    Cardiomyopathy     Diabetes type 2    HIV (human immunodeficiency virus infection)     HLD (hyperlipidemia)     HTN (hypertension)     Mitral regurgitation

## 2023-03-14 NOTE — H&P ADULT - ASSESSMENT
This 63 y/o Female, former smoker, former drug abuse, with PMHx of HTN, HIV (viral load undetectable per pt), HCV, Breast CA 2017 (s/p Chemo, radical mastectomy, no radiation), cardiomyopathy, nonobs CAD on cath 2019, and MR, referred by Dr. King, to Dr. Espinoza for further workup and evaluation of MR. Patient states that she noticed worsening SOB/DUNN with 1 flight of stairs. She is also experiencing worsening fatigue x2 months. She reports that about 3 months ago -he woke up on the floor and is unsure of how. She did not go to the emergency room or see a physician for this episode and there have been no further occurrences. She quit cigarettes, etoh in 1997. She uses marijuana for appetite stimulator post chemo. She lives at home with her son who is 37y/o. She does not use a cane or walker.     Admit under Dr. Espinoza  via same day surgery. Consent signed, placed on chart.  Risks/benefits reviewed, patient understands and agrees. T&S ordered and blood products placed on hold for OR.  To [facility]  post-op.

## 2023-03-15 ENCOUNTER — TRANSCRIPTION ENCOUNTER (OUTPATIENT)
Age: 63
End: 2023-03-15

## 2023-03-15 VITALS
HEART RATE: 59 BPM | DIASTOLIC BLOOD PRESSURE: 62 MMHG | SYSTOLIC BLOOD PRESSURE: 138 MMHG | RESPIRATION RATE: 16 BRPM | OXYGEN SATURATION: 100 %

## 2023-03-15 LAB
ALBUMIN SERPL ELPH-MCNC: 3.4 G/DL — SIGNIFICANT CHANGE UP (ref 3.3–5)
ALP SERPL-CCNC: 47 U/L — SIGNIFICANT CHANGE UP (ref 40–120)
ALT FLD-CCNC: 8 U/L — LOW (ref 10–45)
ANION GAP SERPL CALC-SCNC: 6 MMOL/L — SIGNIFICANT CHANGE UP (ref 5–17)
APTT BLD: 30.2 SEC — SIGNIFICANT CHANGE UP (ref 27.5–35.5)
AST SERPL-CCNC: 14 U/L — SIGNIFICANT CHANGE UP (ref 10–40)
BILIRUB SERPL-MCNC: 0.4 MG/DL — SIGNIFICANT CHANGE UP (ref 0.2–1.2)
BUN SERPL-MCNC: 10 MG/DL — SIGNIFICANT CHANGE UP (ref 7–23)
CALCIUM SERPL-MCNC: 8.8 MG/DL — SIGNIFICANT CHANGE UP (ref 8.4–10.5)
CHLORIDE SERPL-SCNC: 113 MMOL/L — HIGH (ref 96–108)
CO2 SERPL-SCNC: 24 MMOL/L — SIGNIFICANT CHANGE UP (ref 22–31)
CREAT SERPL-MCNC: 0.78 MG/DL — SIGNIFICANT CHANGE UP (ref 0.5–1.3)
EGFR: 86 ML/MIN/1.73M2 — SIGNIFICANT CHANGE UP
GAS PNL BLDA: SIGNIFICANT CHANGE UP
GLUCOSE BLDC GLUCOMTR-MCNC: 99 MG/DL — SIGNIFICANT CHANGE UP (ref 70–99)
GLUCOSE SERPL-MCNC: 118 MG/DL — HIGH (ref 70–99)
HCT VFR BLD CALC: 38.9 % — SIGNIFICANT CHANGE UP (ref 34.5–45)
HGB BLD-MCNC: 12.9 G/DL — SIGNIFICANT CHANGE UP (ref 11.5–15.5)
INR BLD: 1.14 — SIGNIFICANT CHANGE UP (ref 0.88–1.16)
MAGNESIUM SERPL-MCNC: 1.7 MG/DL — SIGNIFICANT CHANGE UP (ref 1.6–2.6)
MCHC RBC-ENTMCNC: 33.2 GM/DL — SIGNIFICANT CHANGE UP (ref 32–36)
MCHC RBC-ENTMCNC: 33.7 PG — SIGNIFICANT CHANGE UP (ref 27–34)
MCV RBC AUTO: 101.6 FL — HIGH (ref 80–100)
NRBC # BLD: 0 /100 WBCS — SIGNIFICANT CHANGE UP (ref 0–0)
PHOSPHATE SERPL-MCNC: 3 MG/DL — SIGNIFICANT CHANGE UP (ref 2.5–4.5)
PLATELET # BLD AUTO: 138 K/UL — LOW (ref 150–400)
POTASSIUM SERPL-MCNC: 4.1 MMOL/L — SIGNIFICANT CHANGE UP (ref 3.5–5.3)
POTASSIUM SERPL-SCNC: 4.1 MMOL/L — SIGNIFICANT CHANGE UP (ref 3.5–5.3)
PROT SERPL-MCNC: 6.3 G/DL — SIGNIFICANT CHANGE UP (ref 6–8.3)
PROTHROM AB SERPL-ACNC: 13.6 SEC — HIGH (ref 10.5–13.4)
RBC # BLD: 3.83 M/UL — SIGNIFICANT CHANGE UP (ref 3.8–5.2)
RBC # FLD: 13.7 % — SIGNIFICANT CHANGE UP (ref 10.3–14.5)
SODIUM SERPL-SCNC: 143 MMOL/L — SIGNIFICANT CHANGE UP (ref 135–145)
WBC # BLD: 4.93 K/UL — SIGNIFICANT CHANGE UP (ref 3.8–10.5)
WBC # FLD AUTO: 4.93 K/UL — SIGNIFICANT CHANGE UP (ref 3.8–10.5)

## 2023-03-15 PROCEDURE — 71045 X-RAY EXAM CHEST 1 VIEW: CPT

## 2023-03-15 PROCEDURE — P9045: CPT

## 2023-03-15 PROCEDURE — 83735 ASSAY OF MAGNESIUM: CPT

## 2023-03-15 PROCEDURE — 36415 COLL VENOUS BLD VENIPUNCTURE: CPT

## 2023-03-15 PROCEDURE — 86900 BLOOD TYPING SEROLOGIC ABO: CPT

## 2023-03-15 PROCEDURE — 93306 TTE W/DOPPLER COMPLETE: CPT | Mod: 26

## 2023-03-15 PROCEDURE — 97161 PT EVAL LOW COMPLEX 20 MIN: CPT

## 2023-03-15 PROCEDURE — 85730 THROMBOPLASTIN TIME PARTIAL: CPT

## 2023-03-15 PROCEDURE — 85025 COMPLETE CBC W/AUTO DIFF WBC: CPT

## 2023-03-15 PROCEDURE — 84132 ASSAY OF SERUM POTASSIUM: CPT

## 2023-03-15 PROCEDURE — 85610 PROTHROMBIN TIME: CPT

## 2023-03-15 PROCEDURE — C1769: CPT

## 2023-03-15 PROCEDURE — 82330 ASSAY OF CALCIUM: CPT

## 2023-03-15 PROCEDURE — 86901 BLOOD TYPING SEROLOGIC RH(D): CPT

## 2023-03-15 PROCEDURE — 82962 GLUCOSE BLOOD TEST: CPT

## 2023-03-15 PROCEDURE — C1766: CPT

## 2023-03-15 PROCEDURE — 83036 HEMOGLOBIN GLYCOSYLATED A1C: CPT

## 2023-03-15 PROCEDURE — 80053 COMPREHEN METABOLIC PANEL: CPT

## 2023-03-15 PROCEDURE — 84443 ASSAY THYROID STIM HORMONE: CPT

## 2023-03-15 PROCEDURE — 84295 ASSAY OF SERUM SODIUM: CPT

## 2023-03-15 PROCEDURE — 93005 ELECTROCARDIOGRAM TRACING: CPT

## 2023-03-15 PROCEDURE — 93312 ECHO TRANSESOPHAGEAL: CPT

## 2023-03-15 PROCEDURE — 82550 ASSAY OF CK (CPK): CPT

## 2023-03-15 PROCEDURE — 86923 COMPATIBILITY TEST ELECTRIC: CPT

## 2023-03-15 PROCEDURE — 86850 RBC ANTIBODY SCREEN: CPT

## 2023-03-15 PROCEDURE — 84100 ASSAY OF PHOSPHORUS: CPT

## 2023-03-15 PROCEDURE — L8699: CPT

## 2023-03-15 PROCEDURE — 85027 COMPLETE CBC AUTOMATED: CPT

## 2023-03-15 PROCEDURE — C1894: CPT

## 2023-03-15 PROCEDURE — 93306 TTE W/DOPPLER COMPLETE: CPT

## 2023-03-15 PROCEDURE — 82803 BLOOD GASES ANY COMBINATION: CPT

## 2023-03-15 RX ORDER — METOPROLOL TARTRATE 50 MG
25 TABLET ORAL DAILY
Refills: 0 | Status: DISCONTINUED | OUTPATIENT
Start: 2023-03-16 | End: 2023-03-15

## 2023-03-15 RX ORDER — SPIRONOLACTONE 25 MG/1
1 TABLET, FILM COATED ORAL
Qty: 30 | Refills: 0
Start: 2023-03-15 | End: 2023-04-13

## 2023-03-15 RX ORDER — SACUBITRIL AND VALSARTAN 24; 26 MG/1; MG/1
1 TABLET, FILM COATED ORAL
Qty: 60 | Refills: 0
Start: 2023-03-15 | End: 2023-04-13

## 2023-03-15 RX ORDER — PANTOPRAZOLE SODIUM 20 MG/1
1 TABLET, DELAYED RELEASE ORAL
Qty: 30 | Refills: 0
Start: 2023-03-15 | End: 2023-04-13

## 2023-03-15 RX ORDER — SPIRONOLACTONE 25 MG/1
1 TABLET, FILM COATED ORAL
Qty: 0 | Refills: 0 | DISCHARGE

## 2023-03-15 RX ORDER — METOPROLOL TARTRATE 50 MG
1 TABLET ORAL
Qty: 30 | Refills: 0
Start: 2023-03-15 | End: 2023-04-13

## 2023-03-15 RX ORDER — EMPAGLIFLOZIN 10 MG/1
1 TABLET, FILM COATED ORAL
Qty: 30 | Refills: 0
Start: 2023-03-15 | End: 2023-04-13

## 2023-03-15 RX ORDER — METOPROLOL TARTRATE 50 MG
1 TABLET ORAL
Qty: 0 | Refills: 0 | DISCHARGE

## 2023-03-15 RX ORDER — ASPIRIN/CALCIUM CARB/MAGNESIUM 324 MG
1 TABLET ORAL
Qty: 30 | Refills: 0
Start: 2023-03-15 | End: 2023-04-13

## 2023-03-15 RX ORDER — BICTEGRAVIR SODIUM, EMTRICITABINE, AND TENOFOVIR ALAFENAMIDE FUMARATE 30; 120; 15 MG/1; MG/1; MG/1
1 TABLET ORAL
Qty: 30 | Refills: 0
Start: 2023-03-15 | End: 2023-04-13

## 2023-03-15 RX ORDER — GABAPENTIN 400 MG/1
1 CAPSULE ORAL
Qty: 0 | Refills: 0 | DISCHARGE

## 2023-03-15 RX ORDER — VALACYCLOVIR 500 MG/1
1 TABLET, FILM COATED ORAL
Qty: 60 | Refills: 0
Start: 2023-03-15 | End: 2023-04-13

## 2023-03-15 RX ORDER — RISPERIDONE 4 MG/1
1 TABLET ORAL
Qty: 0 | Refills: 0 | DISCHARGE

## 2023-03-15 RX ORDER — VALACYCLOVIR 500 MG/1
1 TABLET, FILM COATED ORAL
Qty: 0 | Refills: 0 | DISCHARGE

## 2023-03-15 RX ORDER — GABAPENTIN 400 MG/1
1 CAPSULE ORAL
Qty: 90 | Refills: 0
Start: 2023-03-15 | End: 2023-04-13

## 2023-03-15 RX ORDER — EMPAGLIFLOZIN 10 MG/1
1 TABLET, FILM COATED ORAL
Qty: 0 | Refills: 0 | DISCHARGE

## 2023-03-15 RX ORDER — METOPROLOL TARTRATE 50 MG
37.5 TABLET ORAL DAILY
Refills: 0 | Status: DISCONTINUED | OUTPATIENT
Start: 2023-03-15 | End: 2023-03-15

## 2023-03-15 RX ORDER — SACUBITRIL AND VALSARTAN 24; 26 MG/1; MG/1
1 TABLET, FILM COATED ORAL
Qty: 0 | Refills: 0 | DISCHARGE

## 2023-03-15 RX ORDER — ATORVASTATIN CALCIUM 80 MG/1
1 TABLET, FILM COATED ORAL
Qty: 30 | Refills: 0
Start: 2023-03-15 | End: 2023-04-13

## 2023-03-15 RX ORDER — BICTEGRAVIR SODIUM, EMTRICITABINE, AND TENOFOVIR ALAFENAMIDE FUMARATE 30; 120; 15 MG/1; MG/1; MG/1
1 TABLET ORAL
Qty: 0 | Refills: 0 | DISCHARGE

## 2023-03-15 RX ORDER — ATORVASTATIN CALCIUM 80 MG/1
1 TABLET, FILM COATED ORAL
Qty: 0 | Refills: 0 | DISCHARGE

## 2023-03-15 RX ADMIN — GABAPENTIN 300 MILLIGRAM(S): 400 CAPSULE ORAL at 05:33

## 2023-03-15 RX ADMIN — SPIRONOLACTONE 25 MILLIGRAM(S): 25 TABLET, FILM COATED ORAL at 05:33

## 2023-03-15 RX ADMIN — Medication 25 MILLIGRAM(S): at 05:33

## 2023-03-15 RX ADMIN — CHLORHEXIDINE GLUCONATE 5 MILLILITER(S): 213 SOLUTION TOPICAL at 05:33

## 2023-03-15 RX ADMIN — HEPARIN SODIUM 5000 UNIT(S): 5000 INJECTION INTRAVENOUS; SUBCUTANEOUS at 05:33

## 2023-03-15 RX ADMIN — SACUBITRIL AND VALSARTAN 1 TABLET(S): 24; 26 TABLET, FILM COATED ORAL at 05:32

## 2023-03-15 RX ADMIN — BICTEGRAVIR SODIUM, EMTRICITABINE, AND TENOFOVIR ALAFENAMIDE FUMARATE 1 TABLET(S): 30; 120; 15 TABLET ORAL at 12:31

## 2023-03-15 RX ADMIN — Medication 81 MILLIGRAM(S): at 12:31

## 2023-03-15 RX ADMIN — Medication 100 MILLIGRAM(S): at 05:53

## 2023-03-15 RX ADMIN — RISPERIDONE 0.25 MILLIGRAM(S): 4 TABLET ORAL at 12:31

## 2023-03-15 NOTE — DISCHARGE NOTE NURSING/CASE MANAGEMENT/SOCIAL WORK - PATIENT PORTAL LINK FT
You can access the FollowMyHealth Patient Portal offered by [facility] by registering at the following website: [email or web address]. By joining [email or web address]’s FollowMyHealth portal, you will also be able to view your health information using other applications (apps) compatible with our system.

## 2023-03-15 NOTE — DISCHARGE NOTE PROVIDER - NSDCFUSCHEDAPPT_GEN_ALL_CORE_FT
Shivani King  [facility] Physician [facility]  [facility] [address]  Scheduled Appointment: 03/22/2023

## 2023-03-15 NOTE — DISCHARGE NOTE PROVIDER - CARE PROVIDERS DIRECT ADDRESSES
[email or web address]

## 2023-03-15 NOTE — DISCHARGE NOTE PROVIDER - PROVIDER TOKENS
PROVIDER:[TOKEN:[[id number]],FOLLOWUP:[1 week]],PROVIDER:[TOKEN:[[id number]],FOLLOWUP:[2 weeks]]

## 2023-03-15 NOTE — DISCHARGE NOTE PROVIDER - HOSPITAL COURSE
This 61 y/o Female, former smoker, former drug abuse, with PMHx of HTN, HIV (viral load undetectable per pt), HCV, Breast CA 2017 (s/p Chemo, radical mastectomy, no radiation), cardiomyopathy, nonobs CAD on cath 2019, and MR, referred by Dr. King, to Dr. Espinoza for further workup and evaluation of MR. Patient states that she noticed worsening SOB/DUNN with 1 flight of stairs. She is also experiencing worsening fatigue x2 months. She reports that about 3 months ago -he woke up on the floor and is unsure of how. She did not go to the emergency room or see a physician for this episode and there have been no further occurrences. She quit cigarettes, etoh in 1997. She uses marijuana for appetite stimulator post chemo.   On 3/14/23 patient underwent mitraclip x1.  Patient brought to [facility] as mini-ICU.  POD 1 ECHO performed showing moderate mitral regurgitation.  Patient   This 63 y/o Female, former smoker, former drug abuse, with PMHx of HTN, HIV (viral load undetectable per pt), HCV, Breast CA 2017 (s/p Chemo, radical mastectomy, no radiation), cardiomyopathy, nonobs CAD on cath 2019, and MR, referred by Dr. King, to Dr. Espinoza for further workup and evaluation of MR. Patient states that she noticed worsening SOB/DUNN with 1 flight of stairs. She is also experiencing worsening fatigue x2 months. She reports that about 3 months ago -he woke up on the floor and is unsure of how. She did not go to the emergency room or see a physician for this episode and there have been no further occurrences. She quit cigarettes, etoh in 1997. She uses marijuana for appetite stimulator post chemo.   On 3/14/23 patient underwent mitraclip x1.  Patient brought to [facility] as mini-ICU.  POD 1 ECHO performed showing moderate mitral regurgitation.  Patient ambulating independently with room air, tolerating diet, pain controlled, urinating and having bowel movements.  Per Dr. Espinoza, Patient stable for discharge home.     Over 35 minutes was spent with the patient reviewing the discharge material including medications, follow up appointments, recovery, concerning symptoms, and how to contact their health care providers if they have questions   This 61 y/o Female, former smoker, former drug abuse, with PMHx of HTN, HIV (viral load undetectable per pt), HCV, Breast CA 2017 (s/p Chemo, radical mastectomy, no radiation), cardiomyopathy, nonobs CAD on cath 2019, and MR, referred by Dr. King, to Dr. Espinoza for further workup and evaluation of MR. Patient states that she noticed worsening SOB/DUNN with 1 flight of stairs. She is also experiencing worsening fatigue x2 months. She reports that about 3 months ago -he woke up on the floor and is unsure of how. She did not go to the emergency room or see a physician for this episode and there have been no further occurrences. She quit cigarettes, etoh in 1997. She uses marijuana for appetite stimulator post chemo.   On 3/14/23 patient underwent mitraclip x1.  Patient brought to [facility] as mini-ICU.  POD 1 ECHO performed showing moderate mitral regurgitation.  Patient ambulating independently with room air, tolerating diet, pain controlled, urinating and having bowel movements.  Per Dr. Espinoza, Patient stable for discharge home.     Over 35 minutes was spent with the patient reviewing the discharge material including medications, follow up appointments, recovery, concerning symptoms, and how to contact their health care providers if they have questions    GEN: NAD, looks comfortable  Psych: Mood appropriate  Neuro: A&Ox3.  No focal deficits.  Moving all extremities.   HEENT: No obvious abnormalities  CV: S1S2, regular, no murmurs appreciated.  No carotid bruits.  No JVD  Lungs: Clear B/L.  No wheezing, rales or rhonchi  ABD: Soft, non-tender, non-distended.  +Bowel sounds  EXT: Warm and well perfused.  No peripheral edema noted, Right groin soft no hematoma.  Mattress sutures removed  Musculoskeletal: Moving all extremities with normal ROM, no joint swelling  PV: Pedal pulses palpable

## 2023-03-15 NOTE — DISCHARGE NOTE PROVIDER - NSDCCPCAREPLAN_GEN_ALL_CORE_FT
PRINCIPAL DISCHARGE DIAGNOSIS  Diagnosis: Mitral regurgitation  Assessment and Plan of Treatment:       SECONDARY DISCHARGE DIAGNOSES  Diagnosis: HTN (hypertension)  Assessment and Plan of Treatment:     Diagnosis: HLD (hyperlipidemia)  Assessment and Plan of Treatment:     Diagnosis: CAD (coronary artery disease)  Assessment and Plan of Treatment:     Diagnosis: HIV disease  Assessment and Plan of Treatment:

## 2023-03-15 NOTE — DISCHARGE NOTE PROVIDER - NSDCCPTREATMENT_GEN_ALL_CORE_FT
PRINCIPAL PROCEDURE  Procedure: Transcatheter insertion of mitral valve leaflet clip  Findings and Treatment:

## 2023-03-15 NOTE — PHYSICAL THERAPY INITIAL EVALUATION ADULT - PERTINENT HX OF CURRENT PROBLEM, REHAB EVAL
62 year old female referred by Dr. King, to Dr. Espinoza for further workup and evaluation of MR. Patient states that she noticed worsening SOB/DUNN with 1 flight of stairs. She is also experiencing worsening fatigue x2 months.

## 2023-03-15 NOTE — DISCHARGE NOTE PROVIDER - NSDCMRMEDTOKEN_GEN_ALL_CORE_FT
aspirin 81 mg oral tablet: 1 tab(s) orally once a day  atorvastatin 40 mg oral tablet: 1 tab(s) orally once a day  Biktarvy 50 mg-200 mg-25 mg oral tablet: 1 tab(s) orally once a day  Entresto 97 mg-103 mg oral tablet: 1 tab(s) orally 2 times a day  gabapentin 400 mg oral capsule: 1 cap(s) orally 3 times a day  Jardiance 10 mg oral tablet: 1 tab(s) orally once a day (in the morning)  risperiDONE 0.25 mg oral tablet: 1 tab(s) orally once a day  spironolactone 25 mg oral tablet: 1 tab(s) orally once a day  Toprol-XL 25 mg oral tablet, extended release: 1 tab(s) orally once a day  valACYclovir 1 g oral tablet: 1 tab(s) orally 2 times a day   aspirin 81 mg oral delayed release tablet: 1 tab(s) orally once a day  atorvastatin 40 mg oral tablet: 1 tab(s) orally once a day  bictegravir/emtricitabine/tenofovir 50 mg-200 mg-25 mg oral tablet: 1 tab(s) orally once a day  gabapentin 300 mg oral capsule: 1 cap(s) orally 3 times a day  Jardiance 10 mg oral tablet: 1 tab(s) orally once a day (in the morning)  metoprolol succinate 25 mg oral tablet, extended release: 1 tab(s) orally once a day  pantoprazole 40 mg oral delayed release tablet: 1 tab(s) orally once a day   sacubitril-valsartan 97 mg-103 mg oral tablet: 1 tab(s) orally 2 times a day  spironolactone 25 mg oral tablet: 1 tab(s) orally once a day  valACYclovir 1 g oral tablet: 1 tab(s) orally 2 times a day

## 2023-03-15 NOTE — PHYSICAL THERAPY INITIAL EVALUATION ADULT - GAIT DISTANCE, PT EVAL
150 feet with portable monitor with close supervision, 50 feet without assistive device with contact guard, steady, no loss of balance

## 2023-03-15 NOTE — DISCHARGE NOTE PROVIDER - CARE PROVIDER_API CALL
Thor Espinoza)  Cardiology; Interventional Cardiology  [address] [place] [address]  Phone: [phone]  Fax: [phone]  Follow Up Time: 1 week    Shivani King  CARDIOLOGY  [address] [address]  Phone: [phone]  Fax: [phone]  Follow Up Time: 2 weeks

## 2023-03-16 PROCEDURE — 93355 ECHO TRANSESOPHAGEAL (TEE): CPT | Mod: 26

## 2023-03-20 ENCOUNTER — APPOINTMENT (OUTPATIENT)
Dept: CARDIOTHORACIC SURGERY | Facility: CLINIC | Age: 63
End: 2023-03-20
Payer: MEDICAID

## 2023-03-20 PROCEDURE — 99214 OFFICE O/P EST MOD 30 MIN: CPT | Mod: 24,95

## 2023-03-20 NOTE — HISTORY OF PRESENT ILLNESS
[FreeTextEntry1] : 63 y/o Female, former smoker, former drug abuse, with PMHx of HTN, HIV (viral load undetectable per pt), HCV, Breast CA 2017 (s/p Chemo, radical mastectomy, no radiation), cardiomyopathy, nonobs CAD on cath 2019, and MR, referred by Dr. King, to Dr. Espinoza who underwent a MitraClip with a FREDDIE device on 3/14/2023 who presents for follow up after discharge.   \par \par The procedure was uncomplicated and the patient was discharged home on post op day 1.  \par \par Since discharge, the patient states she feels well. She is walking around her apartment slowly. She did note that before the hospital she weighed 134lbs and now weighs 124lbs, but she states she didn’t eat for 2 days during the procedure time so she thinks that is why. Denies CP, SOB, UDNN, palpitations, orthopnea, dizziness, syncope, fevers or chills.

## 2023-03-20 NOTE — REVIEW OF SYSTEMS
[Fever] : no fever [Headache] : no headache [Chills] : no chills [Feeling Fatigued] : not feeling fatigued [SOB] : no shortness of breath [Dyspnea on exertion] : not dyspnea during exertion [Chest Discomfort] : no chest discomfort [Lower Ext Edema] : no extremity edema [Leg Claudication] : no intermittent leg claudication [Palpitations] : no palpitations [Orthopnea] : no orthopnea [PND] : no PND [Syncope] : no syncope [Negative] : Cardiovascular [FreeTextEntry5] : see hpi

## 2023-03-21 DIAGNOSIS — Z87.898 PERSONAL HISTORY OF OTHER SPECIFIED CONDITIONS: ICD-10-CM

## 2023-03-21 DIAGNOSIS — E11.9 TYPE 2 DIABETES MELLITUS WITHOUT COMPLICATIONS: ICD-10-CM

## 2023-03-21 DIAGNOSIS — Z21 ASYMPTOMATIC HUMAN IMMUNODEFICIENCY VIRUS [HIV] INFECTION STATUS: ICD-10-CM

## 2023-03-21 DIAGNOSIS — I11.0 HYPERTENSIVE HEART DISEASE WITH HEART FAILURE: ICD-10-CM

## 2023-03-21 DIAGNOSIS — F12.90 CANNABIS USE, UNSPECIFIED, UNCOMPLICATED: ICD-10-CM

## 2023-03-21 DIAGNOSIS — I42.9 CARDIOMYOPATHY, UNSPECIFIED: ICD-10-CM

## 2023-03-21 DIAGNOSIS — E78.5 HYPERLIPIDEMIA, UNSPECIFIED: ICD-10-CM

## 2023-03-21 DIAGNOSIS — Z90.11 ACQUIRED ABSENCE OF RIGHT BREAST AND NIPPLE: ICD-10-CM

## 2023-03-21 DIAGNOSIS — Z92.21 PERSONAL HISTORY OF ANTINEOPLASTIC CHEMOTHERAPY: ICD-10-CM

## 2023-03-21 DIAGNOSIS — Z87.891 PERSONAL HISTORY OF NICOTINE DEPENDENCE: ICD-10-CM

## 2023-03-21 DIAGNOSIS — Z80.1 FAMILY HISTORY OF MALIGNANT NEOPLASM OF TRACHEA, BRONCHUS AND LUNG: ICD-10-CM

## 2023-03-21 DIAGNOSIS — B00.9 HERPESVIRAL INFECTION, UNSPECIFIED: ICD-10-CM

## 2023-03-21 DIAGNOSIS — Z85.3 PERSONAL HISTORY OF MALIGNANT NEOPLASM OF BREAST: ICD-10-CM

## 2023-03-21 DIAGNOSIS — I25.10 ATHEROSCLEROTIC HEART DISEASE OF NATIVE CORONARY ARTERY WITHOUT ANGINA PECTORIS: ICD-10-CM

## 2023-03-21 DIAGNOSIS — I50.23 ACUTE ON CHRONIC SYSTOLIC (CONGESTIVE) HEART FAILURE: ICD-10-CM

## 2023-03-21 DIAGNOSIS — I34.0 NONRHEUMATIC MITRAL (VALVE) INSUFFICIENCY: ICD-10-CM

## 2023-03-21 DIAGNOSIS — Z79.84 LONG TERM (CURRENT) USE OF ORAL HYPOGLYCEMIC DRUGS: ICD-10-CM

## 2023-03-22 ENCOUNTER — APPOINTMENT (OUTPATIENT)
Dept: HEART AND VASCULAR | Facility: CLINIC | Age: 63
End: 2023-03-22

## 2023-03-29 NOTE — ED PROVIDER NOTE - NS HIV RISK FACTOR YES
MEDICATIONS  (STANDING):  aspirin enteric coated 81 milliGRAM(s) Oral daily  enoxaparin Injectable 60 milliGRAM(s) SubCutaneous every 12 hours  influenza  Vaccine (HIGH DOSE) 0.7 milliLiter(s) IntraMuscular once  lactated ringers. 1000 milliLiter(s) (75 mL/Hr) IV Continuous <Continuous>  losartan 25 milliGRAM(s) Oral daily  montelukast 10 milliGRAM(s) Oral daily  pantoprazole    Tablet 40 milliGRAM(s) Oral before breakfast  simvastatin 10 milliGRAM(s) Oral at bedtime    MEDICATIONS  (PRN):  acetaminophen     Tablet .. 650 milliGRAM(s) Oral every 6 hours PRN Temp greater or equal to 38C (100.4F), Mild Pain (1 - 3)  ondansetron Injectable 4 milliGRAM(s) IV Push every 8 hours PRN Nausea and/or Vomiting    CAPILLARY BLOOD GLUCOSE        I&O's Summary      PHYSICAL EXAM:  Vital Signs Last 24 Hrs  T(C): 37.2 (29 Mar 2023 06:03), Max: 37.2 (29 Mar 2023 06:03)  T(F): 99 (29 Mar 2023 06:03), Max: 99 (29 Mar 2023 06:03)  HR: 97 (29 Mar 2023 06:03) (93 - 101)  BP: 122/72 (29 Mar 2023 06:03) (117/63 - 143/62)  BP(mean): --  RR: 18 (29 Mar 2023 06:03) (18 - 20)  SpO2: 97% (29 Mar 2023 06:03) (94% - 100%)    Parameters below as of 29 Mar 2023 06:03  Patient On (Oxygen Delivery Method): room air        LABS:                        9.2    6.67  )-----------( 324      ( 29 Mar 2023 06:00 )             28.5     03-29    140  |  115<H>  |  22<H>  ----------------------------<  111<H>  4.2   |  19<L>  |  0.79    Ca    9.1      29 Mar 2023 06:00  Phos  3.0     03-29  Mg     1.9     03-29    TPro  6.0  /  Alb  2.4<L>  /  TBili  0.2  /  DBili  x   /  AST  17  /  ALT  16  /  AlkPhos  50  03-29    PT/INR - ( 28 Mar 2023 16:10 )   PT: 12.9 sec;   INR: 1.08 ratio         PTT - ( 28 Mar 2023 16:10 )  PTT:28.2 sec          SARS-CoV-2: Detected (21 Jan 2023 11:25)  SARS-CoV-2: NotDetec (27 Nov 2022 14:30)      RADIOLOGY & ADDITIONAL TESTS:  < from: US Duplex Venous Lower Ext Complete, Bilateral (03.29.23 @ 10:45) >    ACC: [id number] EXAM:  US DPLX LWR EXT VEINS COMPL BI   ORDERED BY:   NATALY BAZAN     PROCEDURE DATE:  03/29/2023          INTERPRETATION:  CLINICAL INFORMATION: Pulmonary embolism.    TECHNIQUE: Duplex sonography of the BILATERAL LOWER extremity veins with   color and spectral Doppler, with and without compression.    FINDINGS:    RIGHT:  Normal compressibility of the RIGHT common femoral, femoral and popliteal   veins.  Doppler examination shows normal spontaneous and phasic flow.  No RIGHT calf vein thrombosis is detected.    LEFT:  Noncompressible thrombi are noted in the left common femoral, femoral,   popliteal, peroneal and posterior tibial veins, compatible with deep vein   thrombosis.    IMPRESSION:  No evidence of deep venous thrombosis in the right lower extremity.    Extensive deep vein thrombosis in the left leg as detailed above.    Dr. NATALY BAZAN was informed.    < end of copied text >  < from: CT Angio Chest PE Protocol w/ IV Cont (03.28.23 @ 19:40) >    ACC: [id number] EXAM:  CT ANGIO CHEST PULM ART [facility]   ORDERED BY: OSWALDO TYLER     PROCEDURE DATE:  03/28/2023          INTERPRETATION:  Clinical information: Shortness of breath. Evaluate for   pulmonary embolus. Exam is compared to previous study of 1/21/2023.    CT angiogram of the chest was obtained following administration of   intravenous contrast. Approximately 65 cc of Omnipaque 350 was   administered and 35 cc was discarded.    Coronal, sagittal and MIP images were submitted for review.    No hilar and or mediastinal adenopathy is noted.    Heart is normal in size. No pericardial effusion is noted. Once again,   filling defects are noted within the distal right and left main pulmonary   arteries extending into the right middle and both lower lobes pulmonary   artery branches. Clot burden has slightly increased in the distal left   main pulmonary artery when compared to previous exam.    No endobronchial lesions are noted. Minimal atelectasis is noted in the   posterior dependent aspect of the right lower lobe. No pleural effusions   are noted.    Below the diaphragm, visualized portions of the abdomen demonstrate   patient to be status post cholecystectomy.    Degenerative changes of the spine are noted.    IMPRESSION: Pulmonary emboli within the distal portions of the right and   left main pulmonary arteries extending into the right middle and both   lower lobe pulmonary artery branches. Clot burden in the distal left main   pulmonary artery has slightly increased when compared to previous exam.    < end of copied text >         Reason for Admission: Wheezing, SOB  History of Present Illness:   87F from home, lives with daughter, uses a walker, with PMHx HTN, HLD, DM, Osteoporosis, Stage II B Gastric Adenocarcinoma s/p distal gastrectomy in 2015 on active chemo (follows [facility] [place]), Early Multiple myeloma, and recently diagnosed with b/l PE (unclear if still on Eliquis) and PSHx ASHU w/BSO and Cholecystectomy, presenting to the ED with shortness of breath c/o wheezing and cough x  2d. Recently admitted 1/21/23-1/26/23 for acute hypoxic respiratory failure 2/2 new submassive PE and COVID with TTE negative for right heart strain and normal cardiac function and discharged on Eliquis. Pt is COVID vaccinated x 3. She reports that she feels like she may have had a cold stating that she has only had SOB and wheezing with no relief from her home inhalers. Denies chest pain, change in cough, or vomiting. No sick contact at  home. Denies fevers, chills, and night sweats. Patient and family at the bedside unable to recall medication list and unsure if she is still taking AC, per sure scripts she has not had another refill after the 30-d post-discharge, family states there weren't anymore refills left and did not know they needed follow up for additional refills. Reports compliant with other refilled meds. Last chemo was two weeks ago, with next chemo scheduled for tomorrow at [facility]. Denies other acute complaints.     #[id number]    REVIEW OF SYSTEMS:    CONSTITUTIONAL: No fever, no loss of appetite. no chills, no weight loss   EYES: no acute visual disturbances  NECK: No pain or stiffness  RESPIRATORY: + cough; No shortness of breath  CARDIOVASCULAR: No chest pain, no palpitations  GASTROINTESTINAL: No pain. No nausea or vomiting; No diarrhea   NEUROLOGICAL: No headache or numbness, no tremors  MUSCULOSKELETAL: No joint pain, no muscle pain  GENITOURINARY: no dysuria, no frequency, no hesitancy  PSYCHIATRY: no depression, no anxiety  ALL OTHER  ROS negative        Allergies and Intolerances:        Allergies:  	gabapentin: Drug, Unknown    Home Medications:   * No Current Medications as of 26-Jan-2023 09:15 documented in Structured Notes  · 	apixaban 5 mg oral tablet: 1 tab(s) orally every 12 hours  · 	lactobacillus acidophilus oral capsule: 1 tab(s) orally 2 times a day   · 	guaiFENesin 100 mg/5 mL oral liquid: 5 milliliter(s) orally every 6 hours, As needed, Cough  · 	ALBUTEROL PV  INH: 1 gram(s) inhaled every 8 hours, As Needed for sob  · 	ALENDRONATE 70MG TAB: 1 tab(s) orally once a week  · 	ASPIRIN LOW 81MG EC TAB: 1 tab(s) orally once a day  · 	FAMOTIDINE 40MG TAB: 1 tab(s) orally once a day  · 	LOSARTAN POT 25MG TAB: 1 tab(s) orally once a day  · 	MONTELUKAST 10MG TAB: 1 tab(s) orally once a day  · 	METFORMIN 1000MG TAB: 1 tab(s) orally 2 times a day  · 	OMEPRAZOL RX 40MG CAP: 1 cap(s) orally once a day  · 	ONDANSETRON 4MG TAB: 1 tab(s) orally every 8 hours, As Needed for nausea/vomiting  · 	SIMVASTATIN 10MG TAB: 1 tab(s) orally once a day (at bedtime)  · 	STEGLATRO 15MG TAB: 1 tab(s) orally once a day  · 	ULTRA EYE 0.4-0.3% SELENE: 1 milliliter(s) to each affected eye 2 times a day, As Needed  · 	meclizine 25 mg oral tablet: 1 tab(s) orally once a day, As Needed  · 	Vitamin D3 1250 mcg (50,000 intl units) oral capsule: 1 cap(s) orally once a week    Patient History:    Past Medical, Past Surgical, and Family History:  PAST MEDICAL HISTORY:  Ambulatory dysfunction     Dementia     DM (diabetes mellitus)     Gastric adenocarcinoma s/p resection    HTN (hypertension)     Hypercholesteremia     Multiple myeloma     Pulmonary embolism     Vertigo.     PAST SURGICAL HISTORY:  S/P hysterectomy     S/P tubal ligation.     FAMILY HISTORY:  Sibling  Still living? Unknown  Family history of diabetes mellitus, Age at diagnosis: Age Unknown    Grandparent  Still living? No  Family history of early CAD, Age at diagnosis: Age Unknown.     Social History:  · Substance use	Yes  · Alcohol use	Denies  · Substance use	Denies  · Tobacco use	Former smoker, quit over 30- years ago      MEDICATIONS  (STANDING):  aspirin enteric coated 81 milliGRAM(s) Oral daily  enoxaparin Injectable 60 milliGRAM(s) SubCutaneous every 12 hours  influenza  Vaccine (HIGH DOSE) 0.7 milliLiter(s) IntraMuscular once  lactated ringers. 1000 milliLiter(s) (75 mL/Hr) IV Continuous <Continuous>  losartan 25 milliGRAM(s) Oral daily  montelukast 10 milliGRAM(s) Oral daily  pantoprazole    Tablet 40 milliGRAM(s) Oral before breakfast  simvastatin 10 milliGRAM(s) Oral at bedtime    MEDICATIONS  (PRN):  acetaminophen     Tablet .. 650 milliGRAM(s) Oral every 6 hours PRN Temp greater or equal to 38C (100.4F), Mild Pain (1 - 3)  ondansetron Injectable 4 milliGRAM(s) IV Push every 8 hours PRN Nausea and/or Vomiting    CAPILLARY BLOOD GLUCOSE        I&O's Summary      PHYSICAL EXAM:  Vital Signs Last 24 Hrs  T(C): 37.2 (29 Mar 2023 06:03), Max: 37.2 (29 Mar 2023 06:03)  T(F): 99 (29 Mar 2023 06:03), Max: 99 (29 Mar 2023 06:03)  HR: 97 (29 Mar 2023 06:03) (93 - 101)  BP: 122/72 (29 Mar 2023 06:03) (117/63 - 143/62)  BP(mean): --  RR: 18 (29 Mar 2023 06:03) (18 - 20)  SpO2: 97% (29 Mar 2023 06:03) (94% - 100%)    Parameters below as of 29 Mar 2023 06:03  Patient On (Oxygen Delivery Method): room air      GEN: NAD; A and O x 3  LUNGS: decreased BS B/L, basilar crackles  HEART: S1 S2  ABDOMEN: soft, non-tender, non-distended, + BS  EXTREMITIES: no edema  NERVOUS SYSTEM:  Awake and alert; no focal neuro deficits    LABS:                        9.2    6.67  )-----------( 324      ( 29 Mar 2023 06:00 )             28.5     03-29    140  |  115<H>  |  22<H>  ----------------------------<  111<H>  4.2   |  19<L>  |  0.79    Ca    9.1      29 Mar 2023 06:00  Phos  3.0     03-29  Mg     1.9     03-29    TPro  6.0  /  Alb  2.4<L>  /  TBili  0.2  /  DBili  x   /  AST  17  /  ALT  16  /  AlkPhos  50  03-29    PT/INR - ( 28 Mar 2023 16:10 )   PT: 12.9 sec;   INR: 1.08 ratio         PTT - ( 28 Mar 2023 16:10 )  PTT:28.2 sec          SARS-CoV-2: Detected (21 Jan 2023 11:25)  SARS-CoV-2: NotDetec (27 Nov 2022 14:30)      RADIOLOGY & ADDITIONAL TESTS:  < from: US Duplex Venous Lower Ext Complete, Bilateral (03.29.23 @ 10:45) >    ACC: [id number] EXAM:  US DPLX LWR EXT VEINS COMPL BI   ORDERED BY:   NATALY BAZAN     PROCEDURE DATE:  03/29/2023          INTERPRETATION:  CLINICAL INFORMATION: Pulmonary embolism.    TECHNIQUE: Duplex sonography of the BILATERAL LOWER extremity veins with   color and spectral Doppler, with and without compression.    FINDINGS:    RIGHT:  Normal compressibility of the RIGHT common femoral, femoral and popliteal   veins.  Doppler examination shows normal spontaneous and phasic flow.  No RIGHT calf vein thrombosis is detected.    LEFT:  Noncompressible thrombi are noted in the left common femoral, femoral,   popliteal, peroneal and posterior tibial veins, compatible with deep vein   thrombosis.    IMPRESSION:  No evidence of deep venous thrombosis in the right lower extremity.    Extensive deep vein thrombosis in the left leg as detailed above.    Dr. NATALY BAZAN was informed.    < end of copied text >  < from: CT Angio Chest PE Protocol w/ IV Cont (03.28.23 @ 19:40) >    ACC: [id number] EXAM:  CT ANGIO CHEST PULM ART [facility]   ORDERED BY: OSWALDO TYLER     PROCEDURE DATE:  03/28/2023          INTERPRETATION:  Clinical information: Shortness of breath. Evaluate for   pulmonary embolus. Exam is compared to previous study of 1/21/2023.    CT angiogram of the chest was obtained following administration of   intravenous contrast. Approximately 65 cc of Omnipaque 350 was   administered and 35 cc was discarded.    Coronal, sagittal and MIP images were submitted for review.    No hilar and or mediastinal adenopathy is noted.    Heart is normal in size. No pericardial effusion is noted. Once again,   filling defects are noted within the distal right and left main pulmonary   arteries extending into the right middle and both lower lobes pulmonary   artery branches. Clot burden has slightly increased in the distal left   main pulmonary artery when compared to previous exam.    No endobronchial lesions are noted. Minimal atelectasis is noted in the   posterior dependent aspect of the right lower lobe. No pleural effusions   are noted.    Below the diaphragm, visualized portions of the abdomen demonstrate   patient to be status post cholecystectomy.    Degenerative changes of the spine are noted.    IMPRESSION: Pulmonary emboli within the distal portions of the right and   left main pulmonary arteries extending into the right middle and both   lower lobe pulmonary artery branches. Clot burden in the distal left main   pulmonary artery has slightly increased when compared to previous exam.    < end of copied text >     Declined

## 2023-03-31 ENCOUNTER — NON-APPOINTMENT (OUTPATIENT)
Age: 63
End: 2023-03-31

## 2023-03-31 ENCOUNTER — APPOINTMENT (OUTPATIENT)
Dept: HEART AND VASCULAR | Facility: CLINIC | Age: 63
End: 2023-03-31
Payer: MEDICAID

## 2023-03-31 VITALS
WEIGHT: 139.99 LBS | HEIGHT: 67 IN | OXYGEN SATURATION: 95 % | BODY MASS INDEX: 21.97 KG/M2 | SYSTOLIC BLOOD PRESSURE: 138 MMHG | TEMPERATURE: 99.4 F | HEART RATE: 61 BPM | DIASTOLIC BLOOD PRESSURE: 80 MMHG

## 2023-03-31 PROCEDURE — 93000 ELECTROCARDIOGRAM COMPLETE: CPT

## 2023-03-31 PROCEDURE — 99214 OFFICE O/P EST MOD 30 MIN: CPT | Mod: 25

## 2023-04-02 NOTE — PHYSICAL EXAM
[Well Developed] : well developed [Well Nourished] : well nourished [No Acute Distress] : no acute distress [Normal Venous Pressure] : normal venous pressure [No Carotid Bruit] : no carotid bruit [Normal S1, S2] : normal S1, S2 [No Murmur] : no murmur [No Rub] : no rub [No Gallop] : no gallop [Clear Lung Fields] : clear lung fields [Good Air Entry] : good air entry [No Respiratory Distress] : no respiratory distress  [Soft] : abdomen soft [Non Tender] : non-tender [No Masses/organomegaly] : no masses/organomegaly [Normal Bowel Sounds] : normal bowel sounds [Normal Gait] : normal gait [No Edema] : no edema [No Cyanosis] : no cyanosis [No Clubbing] : no clubbing [No Varicosities] : no varicosities [No Rash] : no rash [No Skin Lesions] : no skin lesions [Moves all extremities] : moves all extremities [No Focal Deficits] : no focal deficits [Normal Speech] : normal speech [Alert and Oriented] : alert and oriented [Normal memory] : normal memory [General Appearance - Well Developed] : well developed [Normal Appearance] : normal appearance [Well Groomed] : well groomed [General Appearance - Well Nourished] : well nourished [No Deformities] : no deformities [General Appearance - In No Acute Distress] : no acute distress [Normal Conjunctiva] : the conjunctiva exhibited no abnormalities [Eyelids - No Xanthelasma] : the eyelids demonstrated no xanthelasmas [Normal Oral Mucosa] : normal oral mucosa [No Oral Pallor] : no oral pallor [No Oral Cyanosis] : no oral cyanosis [Normal Jugular Venous A Waves Present] : normal jugular venous A waves present [Normal Jugular Venous V Waves Present] : normal jugular venous V waves present [No Jugular Venous Han A Waves] : no jugular venous han A waves [Respiration, Rhythm And Depth] : normal respiratory rhythm and effort [Exaggerated Use Of Accessory Muscles For Inspiration] : no accessory muscle use [Auscultation Breath Sounds / Voice Sounds] : lungs were clear to auscultation bilaterally [Heart Rate And Rhythm] : heart rate and rhythm were normal [Heart Sounds] : normal S1 and S2 [Murmurs] : no murmurs present [Abdomen Soft] : soft [Abdomen Tenderness] : non-tender [Abdomen Mass (___ Cm)] : no abdominal mass palpated [Abnormal Walk] : normal gait [Gait - Sufficient For Exercise Testing] : the gait was sufficient for exercise testing [Nail Clubbing] : no clubbing of the fingernails [Cyanosis, Localized] : no localized cyanosis [Petechial Hemorrhages (___cm)] : no petechial hemorrhages [Skin Color & Pigmentation] : normal skin color and pigmentation [] : no rash [No Venous Stasis] : no venous stasis [Skin Lesions] : no skin lesions [No Skin Ulcers] : no skin ulcer [No Xanthoma] : no  xanthoma was observed [Oriented To Time, Place, And Person] : oriented to person, place, and time [Affect] : the affect was normal [Mood] : the mood was normal [No Anxiety] : not feeling anxious [FreeTextEntry1] : [facility]

## 2023-04-02 NOTE — ASSESSMENT
[FreeTextEntry1] : systolic HF on entresto 97/101 bid and jardiance 10 mg daily and aldactone 25 mg daily\par metoprolol xl 25 mg daily\par she is post roma clip will need cardiac rehab\par knows now needs abx prophylaxis prior to dental work\par follow up in six weeks

## 2023-04-02 NOTE — HISTORY OF PRESENT ILLNESS
[Scheduled Procedure ___] : a [unfilled] [Date of Surgery ___] : on [unfilled] [FreeTextEntry1] : 62 F HTN HIV Breast Cancer s/p chemo no radiation s/p radical mastectomy with cardiomyopathy EF 20% ---> 45% s/p Anne Clip 3/2023 no obs CAD cath 2023 HCV former smoker former drug abuser current Marijuana Smoker\par \par here for for follow up post anne clip she feels well has been able to walk more since the procedure she is tolerating meds well \par \par \par ecg nsr  nsst changes 3/31/2023\par echo 5/2019 EF 40-45% moderate MR\par Echo EF 47% mod to severe MR 1/2023\par JACY mod to severe posteriorly directed MR 1/2023\par \par \par

## 2023-04-10 ENCOUNTER — APPOINTMENT (OUTPATIENT)
Dept: CARDIOTHORACIC SURGERY | Facility: CLINIC | Age: 63
End: 2023-04-10
Payer: MEDICAID

## 2023-04-11 ENCOUNTER — APPOINTMENT (OUTPATIENT)
Dept: HEART AND VASCULAR | Facility: CLINIC | Age: 63
End: 2023-04-11

## 2023-04-17 ENCOUNTER — APPOINTMENT (OUTPATIENT)
Dept: CARDIOTHORACIC SURGERY | Facility: CLINIC | Age: 63
End: 2023-04-17
Payer: MEDICAID

## 2023-04-24 ENCOUNTER — FORM ENCOUNTER (OUTPATIENT)
Age: 63
End: 2023-04-24

## 2023-04-24 ENCOUNTER — APPOINTMENT (OUTPATIENT)
Dept: CARDIOTHORACIC SURGERY | Facility: CLINIC | Age: 63
End: 2023-04-24
Payer: MEDICAID

## 2023-04-25 ENCOUNTER — OUTPATIENT (OUTPATIENT)
Dept: OUTPATIENT SERVICES | Facility: HOSPITAL | Age: 63
LOS: 1 days | End: 2023-04-25
Payer: COMMERCIAL

## 2023-04-25 ENCOUNTER — APPOINTMENT (OUTPATIENT)
Dept: CARDIOTHORACIC SURGERY | Facility: CLINIC | Age: 63
End: 2023-04-25
Payer: MEDICAID

## 2023-04-25 VITALS
DIASTOLIC BLOOD PRESSURE: 75 MMHG | HEIGHT: 67 IN | RESPIRATION RATE: 16 BRPM | BODY MASS INDEX: 20.72 KG/M2 | OXYGEN SATURATION: 98 % | WEIGHT: 132 LBS | HEART RATE: 58 BPM | SYSTOLIC BLOOD PRESSURE: 173 MMHG | TEMPERATURE: 97.4 F

## 2023-04-25 DIAGNOSIS — I50.22 CHRONIC SYSTOLIC (CONGESTIVE) HEART FAILURE: ICD-10-CM

## 2023-04-25 DIAGNOSIS — Z98.890 OTHER SPECIFIED POSTPROCEDURAL STATES: Chronic | ICD-10-CM

## 2023-04-25 DIAGNOSIS — I34.0 NONRHEUMATIC MITRAL (VALVE) INSUFFICIENCY: ICD-10-CM

## 2023-04-25 PROCEDURE — 93306 TTE W/DOPPLER COMPLETE: CPT | Mod: 26

## 2023-04-25 PROCEDURE — 99024 POSTOP FOLLOW-UP VISIT: CPT

## 2023-04-25 PROCEDURE — 93306 TTE W/DOPPLER COMPLETE: CPT

## 2023-04-26 PROBLEM — I34.0 MITRAL VALVE INSUFFICIENCY: Status: ACTIVE | Noted: 2018-04-12

## 2023-04-26 NOTE — PHYSICAL EXAM
[Well Developed] : well developed [No Acute Distress] : no acute distress [Normal S1, S2] : normal S1, S2 [Clear Lung Fields] : clear lung fields [No Respiratory Distress] : no respiratory distress  [Soft] : abdomen soft [Non Tender] : non-tender [Normal Gait] : normal gait [No Edema] : no edema [No Rash] : no rash [Moves all extremities] : moves all extremities [Normal Speech] : normal speech [Alert and Oriented] : alert and oriented

## 2023-06-02 ENCOUNTER — APPOINTMENT (OUTPATIENT)
Dept: HEART AND VASCULAR | Facility: CLINIC | Age: 63
End: 2023-06-02
Payer: MEDICAID

## 2023-06-02 ENCOUNTER — NON-APPOINTMENT (OUTPATIENT)
Age: 63
End: 2023-06-02

## 2023-06-02 VITALS
DIASTOLIC BLOOD PRESSURE: 62 MMHG | HEART RATE: 80 BPM | WEIGHT: 136 LBS | BODY MASS INDEX: 21.35 KG/M2 | HEIGHT: 67 IN | SYSTOLIC BLOOD PRESSURE: 138 MMHG | TEMPERATURE: 98.6 F | OXYGEN SATURATION: 99 %

## 2023-06-02 PROCEDURE — 99214 OFFICE O/P EST MOD 30 MIN: CPT | Mod: 25

## 2023-06-02 PROCEDURE — 36415 COLL VENOUS BLD VENIPUNCTURE: CPT

## 2023-06-02 PROCEDURE — 93000 ELECTROCARDIOGRAM COMPLETE: CPT

## 2023-06-05 LAB
ALBUMIN SERPL ELPH-MCNC: 4.3 G/DL
ALP BLD-CCNC: 61 U/L
ALT SERPL-CCNC: 16 U/L
ANION GAP SERPL CALC-SCNC: 13 MMOL/L
AST SERPL-CCNC: 21 U/L
BILIRUB SERPL-MCNC: 0.4 MG/DL
BUN SERPL-MCNC: 11 MG/DL
CALCIUM SERPL-MCNC: 9.6 MG/DL
CHLORIDE SERPL-SCNC: 110 MMOL/L
CO2 SERPL-SCNC: 24 MMOL/L
CREAT SERPL-MCNC: 0.98 MG/DL
EGFR: 65 ML/MIN/1.73M2
GLUCOSE SERPL-MCNC: 69 MG/DL
NT-PROBNP SERPL-MCNC: 1980 PG/ML
POTASSIUM SERPL-SCNC: 3.8 MMOL/L
PROT SERPL-MCNC: 7.3 G/DL
SODIUM SERPL-SCNC: 146 MMOL/L

## 2023-06-05 NOTE — HISTORY OF PRESENT ILLNESS
[Scheduled Procedure ___] : a [unfilled] [Date of Surgery ___] : on [unfilled] [FreeTextEntry1] : 62 F HTN HIV Breast Cancer s/p chemo no radiation s/p radical mastectomy with cardiomyopathy EF 20% ---> 45% s/p Anne Clip 3/2023 residual mod to severe MR no obs CAD cath 2023 HCV former smoker former drug abuser current Marijuana Smoker\par \par here for fu she feels ok still sob has to stop but less so \par \par \par ecg nsr  nsst changes 3/31/2023\par echo 5/2019 EF 40-45% moderate MR\par Echo EF 47% mod to severe MR 1/2023\par JACY mod to severe posteriorly directed MR 1/2023\par \par \par

## 2023-06-05 NOTE — ASSESSMENT
[FreeTextEntry1] : systolic HF on entresto 97/101 bid and jardiance 10 mg daily and aldactone 25 mg daily\par check bnp if still elevated add torsemide 10 mg M, W, Fri\par stay metoprolol xl 25 mg daily\par she is post roma clip will need cardiac rehab\par knows now needs abx prophylaxis prior to dental work\par to see pulmonary for ct scan findings of possible ILD\par follow up in 3 months

## 2023-06-05 NOTE — PHYSICAL EXAM
[Well Developed] : well developed [Well Nourished] : well nourished [No Acute Distress] : no acute distress [Normal Venous Pressure] : normal venous pressure [No Carotid Bruit] : no carotid bruit [Normal S1, S2] : normal S1, S2 [No Murmur] : no murmur [No Rub] : no rub [No Gallop] : no gallop [Clear Lung Fields] : clear lung fields [Good Air Entry] : good air entry [No Respiratory Distress] : no respiratory distress  [Soft] : abdomen soft [Non Tender] : non-tender [Normal Bowel Sounds] : normal bowel sounds [No Masses/organomegaly] : no masses/organomegaly [Normal Gait] : normal gait [No Edema] : no edema [No Cyanosis] : no cyanosis [No Clubbing] : no clubbing [No Varicosities] : no varicosities [No Rash] : no rash [No Skin Lesions] : no skin lesions [Moves all extremities] : moves all extremities [No Focal Deficits] : no focal deficits [Normal Speech] : normal speech [Alert and Oriented] : alert and oriented [Normal memory] : normal memory [General Appearance - Well Developed] : well developed [Normal Appearance] : normal appearance [Well Groomed] : well groomed [General Appearance - Well Nourished] : well nourished [No Deformities] : no deformities [General Appearance - In No Acute Distress] : no acute distress [Normal Conjunctiva] : the conjunctiva exhibited no abnormalities [Eyelids - No Xanthelasma] : the eyelids demonstrated no xanthelasmas [Normal Oral Mucosa] : normal oral mucosa [No Oral Pallor] : no oral pallor [No Oral Cyanosis] : no oral cyanosis [Normal Jugular Venous A Waves Present] : normal jugular venous A waves present [Normal Jugular Venous V Waves Present] : normal jugular venous V waves present [No Jugular Venous Han A Waves] : no jugular venous han A waves [Respiration, Rhythm And Depth] : normal respiratory rhythm and effort [Exaggerated Use Of Accessory Muscles For Inspiration] : no accessory muscle use [Auscultation Breath Sounds / Voice Sounds] : lungs were clear to auscultation bilaterally [Heart Rate And Rhythm] : heart rate and rhythm were normal [Murmurs] : no murmurs present [Heart Sounds] : normal S1 and S2 [Abdomen Soft] : soft [Abdomen Tenderness] : non-tender [Abdomen Mass (___ Cm)] : no abdominal mass palpated [Abnormal Walk] : normal gait [Gait - Sufficient For Exercise Testing] : the gait was sufficient for exercise testing [Nail Clubbing] : no clubbing of the fingernails [Cyanosis, Localized] : no localized cyanosis [Petechial Hemorrhages (___cm)] : no petechial hemorrhages [] : no rash [Skin Color & Pigmentation] : normal skin color and pigmentation [No Venous Stasis] : no venous stasis [Skin Lesions] : no skin lesions [No Skin Ulcers] : no skin ulcer [No Xanthoma] : no  xanthoma was observed [Oriented To Time, Place, And Person] : oriented to person, place, and time [Affect] : the affect was normal [Mood] : the mood was normal [No Anxiety] : not feeling anxious [FreeTextEntry1] : [facility]

## 2023-06-29 ENCOUNTER — APPOINTMENT (OUTPATIENT)
Dept: PULMONOLOGY | Facility: CLINIC | Age: 63
End: 2023-06-29

## 2023-07-18 ENCOUNTER — APPOINTMENT (OUTPATIENT)
Dept: PULMONOLOGY | Facility: CLINIC | Age: 63
End: 2023-07-18

## 2023-08-15 ENCOUNTER — APPOINTMENT (OUTPATIENT)
Dept: PULMONOLOGY | Facility: CLINIC | Age: 63
End: 2023-08-15

## 2023-08-17 NOTE — ED ADULT NURSE NOTE - RESPIRATORY WDL
Price (Do Not Change): 0.00
Detail Level: Simple
Instructions: This plan will send the code FBSE to the PM system.  DO NOT or CHANGE the price.
Breathing spontaneous and unlabored. Breath sounds clear and equal bilaterally with regular rhythm.

## 2023-09-08 ENCOUNTER — APPOINTMENT (OUTPATIENT)
Dept: HEART AND VASCULAR | Facility: CLINIC | Age: 63
End: 2023-09-08

## 2023-10-26 ENCOUNTER — APPOINTMENT (OUTPATIENT)
Dept: HEART AND VASCULAR | Facility: CLINIC | Age: 63
End: 2023-10-26
Payer: MEDICAID

## 2023-10-26 ENCOUNTER — NON-APPOINTMENT (OUTPATIENT)
Age: 63
End: 2023-10-26

## 2023-10-26 VITALS
TEMPERATURE: 99.3 F | WEIGHT: 145.99 LBS | DIASTOLIC BLOOD PRESSURE: 60 MMHG | OXYGEN SATURATION: 97 % | HEIGHT: 67 IN | BODY MASS INDEX: 22.91 KG/M2 | SYSTOLIC BLOOD PRESSURE: 98 MMHG | HEART RATE: 90 BPM

## 2023-10-26 DIAGNOSIS — I50.22 CHRONIC SYSTOLIC (CONGESTIVE) HEART FAILURE: ICD-10-CM

## 2023-10-26 PROCEDURE — 36415 COLL VENOUS BLD VENIPUNCTURE: CPT

## 2023-10-26 PROCEDURE — 99214 OFFICE O/P EST MOD 30 MIN: CPT | Mod: 25

## 2023-10-26 PROCEDURE — 93000 ELECTROCARDIOGRAM COMPLETE: CPT

## 2023-10-26 RX ORDER — SPIRONOLACTONE 25 MG/1
25 TABLET ORAL
Qty: 90 | Refills: 3 | Status: ACTIVE | COMMUNITY
Start: 2023-02-22 | End: 1900-01-01

## 2023-10-26 RX ORDER — EMPAGLIFLOZIN 10 MG/1
10 TABLET, FILM COATED ORAL DAILY
Qty: 90 | Refills: 3 | Status: ACTIVE | COMMUNITY
Start: 2023-02-03 | End: 1900-01-01

## 2023-10-26 RX ORDER — ASPIRIN ENTERIC COATED TABLETS 81 MG 81 MG/1
81 TABLET, DELAYED RELEASE ORAL
Qty: 90 | Refills: 2 | Status: ACTIVE | COMMUNITY
Start: 2017-10-23 | End: 1900-01-01

## 2023-10-26 RX ORDER — METOPROLOL SUCCINATE 25 MG/1
25 TABLET, EXTENDED RELEASE ORAL DAILY
Qty: 90 | Refills: 3 | Status: ACTIVE | COMMUNITY
Start: 2019-03-05 | End: 1900-01-01

## 2023-10-26 RX ORDER — ATORVASTATIN CALCIUM 40 MG/1
40 TABLET, FILM COATED ORAL
Qty: 90 | Refills: 3 | Status: ACTIVE | COMMUNITY
Start: 1900-01-01 | End: 1900-01-01

## 2023-10-26 RX ORDER — SACUBITRIL AND VALSARTAN 97; 103 MG/1; MG/1
97-103 TABLET, FILM COATED ORAL TWICE DAILY
Qty: 180 | Refills: 2 | Status: ACTIVE | COMMUNITY
Start: 2018-03-14 | End: 1900-01-01

## 2023-10-27 LAB
ALBUMIN SERPL ELPH-MCNC: 4.4 G/DL
ALP BLD-CCNC: 55 U/L
ALT SERPL-CCNC: 18 U/L
ANION GAP SERPL CALC-SCNC: 11 MMOL/L
AST SERPL-CCNC: 21 U/L
BILIRUB SERPL-MCNC: 0.4 MG/DL
BUN SERPL-MCNC: 12 MG/DL
CALCIUM SERPL-MCNC: 9.7 MG/DL
CHLORIDE SERPL-SCNC: 105 MMOL/L
CHOLEST SERPL-MCNC: 141 MG/DL
CO2 SERPL-SCNC: 24 MMOL/L
CREAT SERPL-MCNC: 1.11 MG/DL
CREAT SPEC-SCNC: 45 MG/DL
EGFR: 56 ML/MIN/1.73M2
ESTIMATED AVERAGE GLUCOSE: 111 MG/DL
GLUCOSE SERPL-MCNC: 84 MG/DL
HBA1C MFR BLD HPLC: 5.5 %
HDLC SERPL-MCNC: 72 MG/DL
LDLC SERPL CALC-MCNC: 56 MG/DL
MICROALBUMIN 24H UR DL<=1MG/L-MCNC: <1.2 MG/DL
MICROALBUMIN/CREAT 24H UR-RTO: NORMAL MG/G
NONHDLC SERPL-MCNC: 68 MG/DL
NT-PROBNP SERPL-MCNC: 735 PG/ML
POTASSIUM SERPL-SCNC: 4.5 MMOL/L
PROT SERPL-MCNC: 7.6 G/DL
SODIUM SERPL-SCNC: 140 MMOL/L
TRIGL SERPL-MCNC: 59 MG/DL

## 2023-10-31 RX ORDER — TORSEMIDE 10 MG/1
10 TABLET ORAL
Qty: 45 | Refills: 5 | Status: ACTIVE | COMMUNITY
Start: 2023-06-05 | End: 1900-01-01

## 2023-11-20 ENCOUNTER — APPOINTMENT (OUTPATIENT)
Dept: CARDIOTHORACIC SURGERY | Facility: CLINIC | Age: 63
End: 2023-11-20

## 2024-01-01 NOTE — DATA REVIEWED
[FreeTextEntry1] : 2/10/23 JACY:\par  1. Moderately-to-severely reduced left ventricular systolic function.\par  2. Normal right ventricular size and systolic function.\par  3. The mitral valve is mildly thickened and calcified. The posteior leaflet is tethered. Mitral annular calcification noted. The mean transvalvular gradient is 5.50 mmHg at a heart rate of 90 bpm. The mitral valve area estimated via planimetry is 3.81 cmï¿½. Moderate to severe posteriorly directed mitral regurgitation at a BP of 150 / 74 mmHg.\par  4. No LA/RA/LUZ/RAA thrombus seen.\par  5. Trivial pericardial effusion.\par  6. There is severe pulmonary hypertension, pulmonary artery systolic pressure is 66 mmHg.\par  7. There is mild non-mobile plaque seen in the visualized portion of the descending aorta. There is mild non-mobile plaque seen in the visualized portion of the aortic arch.\par \par 3/12/18 LHC/RHC : mild luminal irregularities, pD1 50%, RCA normal, LVEF 30-35%, LVEDP 11, 3+ MR, RA 7 mmHg, RV 49/0, PA 44/15, PCWP 11, Ao/PA sat 96%/67%. CO/CI 4.28/2.45.\par \par  absent

## 2024-01-05 ENCOUNTER — APPOINTMENT (OUTPATIENT)
Dept: HEART AND VASCULAR | Facility: CLINIC | Age: 64
End: 2024-01-05

## 2024-02-14 ENCOUNTER — APPOINTMENT (OUTPATIENT)
Dept: BREAST CENTER | Facility: CLINIC | Age: 64
End: 2024-02-14

## 2024-02-14 ENCOUNTER — NON-APPOINTMENT (OUTPATIENT)
Age: 64
End: 2024-02-14

## 2024-08-24 NOTE — ED ADULT NURSE NOTE - NSHISCREENINGQ1_ED_A_ED
Problem: Pain  Goal: Verbalizes/displays adequate comfort level or baseline comfort level  Outcome: Progressing  Flowsheets (Taken 8/23/2024 2018)  Verbalizes/displays adequate comfort level or baseline comfort level:   Encourage patient to monitor pain and request assistance   Assess pain using appropriate pain scale   Administer analgesics based on type and severity of pain and evaluate response      No

## 2024-12-13 ENCOUNTER — EMERGENCY (EMERGENCY)
Facility: HOSPITAL | Age: 64
LOS: 1 days | Discharge: LEFT BEFORE DISCHARGE | End: 2024-12-13
Attending: STUDENT IN AN ORGANIZED HEALTH CARE EDUCATION/TRAINING PROGRAM | Admitting: STUDENT IN AN ORGANIZED HEALTH CARE EDUCATION/TRAINING PROGRAM
Payer: COMMERCIAL

## 2024-12-13 VITALS
OXYGEN SATURATION: 98 % | TEMPERATURE: 98 F | WEIGHT: 136.03 LBS | HEART RATE: 60 BPM | RESPIRATION RATE: 16 BRPM | DIASTOLIC BLOOD PRESSURE: 72 MMHG | SYSTOLIC BLOOD PRESSURE: 119 MMHG

## 2024-12-13 DIAGNOSIS — Z98.890 OTHER SPECIFIED POSTPROCEDURAL STATES: Chronic | ICD-10-CM

## 2024-12-13 LAB
ANION GAP SERPL CALC-SCNC: 10 MMOL/L — SIGNIFICANT CHANGE UP (ref 5–17)
BASOPHILS # BLD AUTO: 0.04 K/UL — SIGNIFICANT CHANGE UP (ref 0–0.2)
BASOPHILS NFR BLD AUTO: 1.1 % — SIGNIFICANT CHANGE UP (ref 0–2)
BUN SERPL-MCNC: 11 MG/DL — SIGNIFICANT CHANGE UP (ref 7–23)
CALCIUM SERPL-MCNC: 9.4 MG/DL — SIGNIFICANT CHANGE UP (ref 8.4–10.5)
CHLORIDE SERPL-SCNC: 108 MMOL/L — SIGNIFICANT CHANGE UP (ref 96–108)
CK MB CFR SERPL CALC: 1.5 NG/ML — SIGNIFICANT CHANGE UP (ref 0–6.7)
CK MB CFR SERPL CALC: 1.6 NG/ML — SIGNIFICANT CHANGE UP (ref 0–6.7)
CK SERPL-CCNC: 86 U/L — SIGNIFICANT CHANGE UP (ref 25–170)
CK SERPL-CCNC: 86 U/L — SIGNIFICANT CHANGE UP (ref 25–170)
CO2 SERPL-SCNC: 21 MMOL/L — LOW (ref 22–31)
CREAT SERPL-MCNC: 0.85 MG/DL — SIGNIFICANT CHANGE UP (ref 0.5–1.3)
EGFR: 77 ML/MIN/1.73M2 — SIGNIFICANT CHANGE UP
EOSINOPHIL # BLD AUTO: 0.04 K/UL — SIGNIFICANT CHANGE UP (ref 0–0.5)
EOSINOPHIL NFR BLD AUTO: 1.1 % — SIGNIFICANT CHANGE UP (ref 0–6)
FLUAV AG NPH QL: SIGNIFICANT CHANGE UP
FLUBV AG NPH QL: SIGNIFICANT CHANGE UP
GLUCOSE SERPL-MCNC: 88 MG/DL — SIGNIFICANT CHANGE UP (ref 70–99)
HCT VFR BLD CALC: 38.4 % — SIGNIFICANT CHANGE UP (ref 34.5–45)
HGB BLD-MCNC: 12.9 G/DL — SIGNIFICANT CHANGE UP (ref 11.5–15.5)
IMM GRANULOCYTES NFR BLD AUTO: 0 % — SIGNIFICANT CHANGE UP (ref 0–0.9)
LYMPHOCYTES # BLD AUTO: 1.8 K/UL — SIGNIFICANT CHANGE UP (ref 1–3.3)
LYMPHOCYTES # BLD AUTO: 49 % — HIGH (ref 13–44)
MCHC RBC-ENTMCNC: 33.2 PG — SIGNIFICANT CHANGE UP (ref 27–34)
MCHC RBC-ENTMCNC: 33.6 G/DL — SIGNIFICANT CHANGE UP (ref 32–36)
MCV RBC AUTO: 99 FL — SIGNIFICANT CHANGE UP (ref 80–100)
MONOCYTES # BLD AUTO: 0.26 K/UL — SIGNIFICANT CHANGE UP (ref 0–0.9)
MONOCYTES NFR BLD AUTO: 7.1 % — SIGNIFICANT CHANGE UP (ref 2–14)
NEUTROPHILS # BLD AUTO: 1.53 K/UL — LOW (ref 1.8–7.4)
NEUTROPHILS NFR BLD AUTO: 41.7 % — LOW (ref 43–77)
NRBC # BLD: 0 /100 WBCS — SIGNIFICANT CHANGE UP (ref 0–0)
NT-PROBNP SERPL-SCNC: 1295 PG/ML — HIGH (ref 0–300)
PLATELET # BLD AUTO: 130 K/UL — LOW (ref 150–400)
POTASSIUM SERPL-MCNC: 3.6 MMOL/L — SIGNIFICANT CHANGE UP (ref 3.5–5.3)
POTASSIUM SERPL-SCNC: 3.6 MMOL/L — SIGNIFICANT CHANGE UP (ref 3.5–5.3)
RBC # BLD: 3.88 M/UL — SIGNIFICANT CHANGE UP (ref 3.8–5.2)
RBC # FLD: 13.5 % — SIGNIFICANT CHANGE UP (ref 10.3–14.5)
RSV RNA NPH QL NAA+NON-PROBE: SIGNIFICANT CHANGE UP
SARS-COV-2 RNA SPEC QL NAA+PROBE: SIGNIFICANT CHANGE UP
SODIUM SERPL-SCNC: 139 MMOL/L — SIGNIFICANT CHANGE UP (ref 135–145)
TROPONIN T, HIGH SENSITIVITY RESULT: 6 NG/L — SIGNIFICANT CHANGE UP (ref 0–51)
TROPONIN T, HIGH SENSITIVITY RESULT: <6 NG/L — SIGNIFICANT CHANGE UP (ref 0–51)
WBC # BLD: 3.67 K/UL — LOW (ref 3.8–10.5)
WBC # FLD AUTO: 3.67 K/UL — LOW (ref 3.8–10.5)

## 2024-12-13 PROCEDURE — 80048 BASIC METABOLIC PNL TOTAL CA: CPT

## 2024-12-13 PROCEDURE — 36415 COLL VENOUS BLD VENIPUNCTURE: CPT

## 2024-12-13 PROCEDURE — 99285 EMERGENCY DEPT VISIT HI MDM: CPT | Mod: 25

## 2024-12-13 PROCEDURE — 71045 X-RAY EXAM CHEST 1 VIEW: CPT

## 2024-12-13 PROCEDURE — 85025 COMPLETE CBC W/AUTO DIFF WBC: CPT

## 2024-12-13 PROCEDURE — 87637 SARSCOV2&INF A&B&RSV AMP PRB: CPT

## 2024-12-13 PROCEDURE — 99285 EMERGENCY DEPT VISIT HI MDM: CPT

## 2024-12-13 PROCEDURE — 82553 CREATINE MB FRACTION: CPT

## 2024-12-13 PROCEDURE — 82550 ASSAY OF CK (CPK): CPT

## 2024-12-13 PROCEDURE — 93005 ELECTROCARDIOGRAM TRACING: CPT

## 2024-12-13 PROCEDURE — 84484 ASSAY OF TROPONIN QUANT: CPT

## 2024-12-13 PROCEDURE — 83880 ASSAY OF NATRIURETIC PEPTIDE: CPT

## 2024-12-13 PROCEDURE — 71045 X-RAY EXAM CHEST 1 VIEW: CPT | Mod: 26

## 2024-12-13 NOTE — ED ADULT NURSE NOTE - OBJECTIVE STATEMENT
64y F presents to ED c/o SOB, CP. Endorses SOB x a few days, mostly on exertion, a/w midsternal CP starting today while lifting a heavy object. Also c/o groin pain, states "I took the last pill for a yeast infection yesterday but I have the same pain." Denies cough/congestion, syncope, N/V, other acute sx. Pt AAOx4, speaking in full and clear sentences, NAD at this time. Received 324 ASA by EMS PTA.

## 2024-12-13 NOTE — ED PROVIDER NOTE - CLINICAL SUMMARY MEDICAL DECISION MAKING FREE TEXT BOX
64F, former smoker, former drug abuse, with PMHx of HTN, HIV (viral load undetectable per pt), HCV, Breast CA 2017 (s/p Chemo, radical mastectomy, no radiation), cardiomyopathy, MR s/p mitral clip 2023 p/w 1 day hx of substernal sharp non-radiating non-exertional non-pleuritic cp at rest with associated 3 day hx of DUNN. On exam, VS wnl, no remarkable exam findings.      Plan:  - ekg: sinus bradycardia at 60 no suzette/std/twi  - labs: cbc and bmp grossly unremarkable. bnp mildly elevated 1295. trop neg 6 > less than 6  - rvp neg  - cxr appears clear with mitral clip in place appears similar to prior. echo ordered however pt eloped prior to echo

## 2024-12-13 NOTE — ED ADULT NURSE NOTE - NS ED NURSE ELOPE COMMENTS
States "Im going somewhere else I have been waiting for echo for an hour now and Im in the paredes. Im leaving." IV line removed. MD aware.

## 2024-12-13 NOTE — ED PROVIDER NOTE - OBJECTIVE STATEMENT
64F, former smoker, former drug abuse, with PMHx of HTN, HIV (viral load undetectable per pt), HCV, Breast CA 2017 (s/p Chemo, radical mastectomy, no radiation), cardiomyopathy, 64F, former smoker, former drug abuse, with PMHx of HTN, HIV (viral load undetectable per pt), HCV, Breast CA 2017 (s/p Chemo, radical mastectomy, no radiation), cardiomyopathy, MR s/p mitral clip 2023 p/w 1 day hx of substernal sharp non-radiating non-exertional non-pleuritic cp at rest with associated 3 day hx of DUNN. states this feels similar to prior episode of MR when she needed a mitral clip. denies leg edema.    per triage note: "Patient c/o SOB for a few days. Reports CP starting today. Given 324 ASA by EMS.:"

## 2024-12-13 NOTE — ED PROVIDER NOTE - PHYSICAL EXAMINATION
GENERAL:  Awake, alert and in NAD, non-toxic appearing  ENMT: Airway patent  EYES: conjunctiva clear  CARDIAC: Regular rate, regular rhythm.  Heart sounds S1, S2, no S3, S4. No murmurs, rubs or gallops.  chest: well healed surgical scar chest wall  RESPIRATORY: Breath sounds clear and equal in bilateral anterior lung fields, no wheezes/ronchi/crackles/stridor; pt breathing and speaking comfortably with no increased WOB, no accessory mm. use, no intercostal retractions, no nasal flaring  GI: abdomen soft, non-distended, non-tender, no rebound or guarding.  SKIN: warm and dry, no rashes  PSYCH: awake, alert, calm and cooperative  EXTREMITIES: no ble edema  NEURO: gcs 15

## 2024-12-17 DIAGNOSIS — Z53.29 PROCEDURE AND TREATMENT NOT CARRIED OUT BECAUSE OF PATIENT'S DECISION FOR OTHER REASONS: ICD-10-CM

## 2024-12-17 DIAGNOSIS — R00.1 BRADYCARDIA, UNSPECIFIED: ICD-10-CM

## 2024-12-17 DIAGNOSIS — Z87.891 PERSONAL HISTORY OF NICOTINE DEPENDENCE: ICD-10-CM

## 2024-12-17 DIAGNOSIS — R07.89 OTHER CHEST PAIN: ICD-10-CM

## 2024-12-17 DIAGNOSIS — I10 ESSENTIAL (PRIMARY) HYPERTENSION: ICD-10-CM

## 2024-12-17 DIAGNOSIS — Z21 ASYMPTOMATIC HUMAN IMMUNODEFICIENCY VIRUS [HIV] INFECTION STATUS: ICD-10-CM

## 2024-12-17 DIAGNOSIS — Z85.3 PERSONAL HISTORY OF MALIGNANT NEOPLASM OF BREAST: ICD-10-CM

## 2024-12-17 DIAGNOSIS — R06.02 SHORTNESS OF BREATH: ICD-10-CM
